# Patient Record
Sex: FEMALE | Race: WHITE | NOT HISPANIC OR LATINO | Employment: OTHER | URBAN - METROPOLITAN AREA
[De-identification: names, ages, dates, MRNs, and addresses within clinical notes are randomized per-mention and may not be internally consistent; named-entity substitution may affect disease eponyms.]

---

## 2018-08-31 ENCOUNTER — RECORDS - HEALTHEAST (OUTPATIENT)
Dept: LAB | Facility: CLINIC | Age: 83
End: 2018-08-31

## 2018-08-31 LAB
ANION GAP SERPL CALCULATED.3IONS-SCNC: 8 MMOL/L (ref 5–18)
BUN SERPL-MCNC: 25 MG/DL (ref 8–28)
CALCIUM SERPL-MCNC: 9 MG/DL (ref 8.5–10.5)
CHLORIDE BLD-SCNC: 104 MMOL/L (ref 98–107)
CO2 SERPL-SCNC: 26 MMOL/L (ref 22–31)
CREAT SERPL-MCNC: 0.66 MG/DL (ref 0.6–1.1)
ERYTHROCYTE [DISTWIDTH] IN BLOOD BY AUTOMATED COUNT: 13.3 % (ref 11–14.5)
GFR SERPL CREATININE-BSD FRML MDRD: >60 ML/MIN/1.73M2
GLUCOSE BLD-MCNC: 65 MG/DL (ref 70–125)
HCT VFR BLD AUTO: 30.5 % (ref 35–47)
HGB BLD-MCNC: 9.7 G/DL (ref 12–16)
MCH RBC QN AUTO: 29.4 PG (ref 27–34)
MCHC RBC AUTO-ENTMCNC: 31.8 G/DL (ref 32–36)
MCV RBC AUTO: 92 FL (ref 80–100)
PLATELET # BLD AUTO: 396 THOU/UL (ref 140–440)
PMV BLD AUTO: 9.2 FL (ref 8.5–12.5)
POTASSIUM BLD-SCNC: 4.7 MMOL/L (ref 3.5–5)
RBC # BLD AUTO: 3.3 MILL/UL (ref 3.8–5.4)
SODIUM SERPL-SCNC: 138 MMOL/L (ref 136–145)
WBC: 33.7 THOU/UL (ref 4–11)

## 2018-09-03 ENCOUNTER — RECORDS - HEALTHEAST (OUTPATIENT)
Dept: LAB | Facility: CLINIC | Age: 83
End: 2018-09-03

## 2018-09-03 LAB
ANION GAP SERPL CALCULATED.3IONS-SCNC: 8 MMOL/L (ref 5–18)
BUN SERPL-MCNC: 22 MG/DL (ref 8–28)
CALCIUM SERPL-MCNC: 9 MG/DL (ref 8.5–10.5)
CHLORIDE BLD-SCNC: 102 MMOL/L (ref 98–107)
CO2 SERPL-SCNC: 29 MMOL/L (ref 22–31)
CREAT SERPL-MCNC: 0.73 MG/DL (ref 0.6–1.1)
ERYTHROCYTE [DISTWIDTH] IN BLOOD BY AUTOMATED COUNT: 14.1 % (ref 11–14.5)
GFR SERPL CREATININE-BSD FRML MDRD: >60 ML/MIN/1.73M2
GLUCOSE BLD-MCNC: 62 MG/DL (ref 70–125)
HCT VFR BLD AUTO: 28.9 % (ref 35–47)
HGB BLD-MCNC: 9.4 G/DL (ref 12–16)
MCH RBC QN AUTO: 29.8 PG (ref 27–34)
MCHC RBC AUTO-ENTMCNC: 32.5 G/DL (ref 32–36)
MCV RBC AUTO: 92 FL (ref 80–100)
PLATELET # BLD AUTO: 340 THOU/UL (ref 140–440)
PMV BLD AUTO: 9.3 FL (ref 8.5–12.5)
POTASSIUM BLD-SCNC: 4.5 MMOL/L (ref 3.5–5)
RBC # BLD AUTO: 3.15 MILL/UL (ref 3.8–5.4)
SODIUM SERPL-SCNC: 139 MMOL/L (ref 136–145)
WBC: 37 THOU/UL (ref 4–11)

## 2018-10-22 ENCOUNTER — RECORDS - HEALTHEAST (OUTPATIENT)
Dept: LAB | Facility: CLINIC | Age: 83
End: 2018-10-22

## 2018-10-22 LAB
ANION GAP SERPL CALCULATED.3IONS-SCNC: 8 MMOL/L (ref 5–18)
BUN SERPL-MCNC: 20 MG/DL (ref 8–28)
CALCIUM SERPL-MCNC: 8.8 MG/DL (ref 8.5–10.5)
CHLORIDE BLD-SCNC: 103 MMOL/L (ref 98–107)
CO2 SERPL-SCNC: 27 MMOL/L (ref 22–31)
CREAT SERPL-MCNC: 0.71 MG/DL (ref 0.6–1.1)
GFR SERPL CREATININE-BSD FRML MDRD: >60 ML/MIN/1.73M2
GLUCOSE BLD-MCNC: 73 MG/DL (ref 70–125)
HGB BLD-MCNC: 9.3 G/DL (ref 12–16)
POTASSIUM BLD-SCNC: 4.3 MMOL/L (ref 3.5–5)
SODIUM SERPL-SCNC: 138 MMOL/L (ref 136–145)

## 2019-06-17 ENCOUNTER — RECORDS - HEALTHEAST (OUTPATIENT)
Dept: LAB | Facility: CLINIC | Age: 84
End: 2019-06-17

## 2019-06-17 LAB
ANION GAP SERPL CALCULATED.3IONS-SCNC: 8 MMOL/L (ref 5–18)
BUN SERPL-MCNC: 12 MG/DL (ref 8–28)
CALCIUM SERPL-MCNC: 8.9 MG/DL (ref 8.5–10.5)
CHLORIDE BLD-SCNC: 109 MMOL/L (ref 98–107)
CO2 SERPL-SCNC: 24 MMOL/L (ref 22–31)
CREAT SERPL-MCNC: 0.77 MG/DL (ref 0.6–1.1)
ERYTHROCYTE [DISTWIDTH] IN BLOOD BY AUTOMATED COUNT: 12.4 % (ref 11–14.5)
GFR SERPL CREATININE-BSD FRML MDRD: >60 ML/MIN/1.73M2
GLUCOSE BLD-MCNC: 68 MG/DL (ref 70–125)
HCT VFR BLD AUTO: 38.7 % (ref 35–47)
HGB BLD-MCNC: 12.2 G/DL (ref 12–16)
MCH RBC QN AUTO: 31.5 PG (ref 27–34)
MCHC RBC AUTO-ENTMCNC: 31.5 G/DL (ref 32–36)
MCV RBC AUTO: 100 FL (ref 80–100)
PLATELET # BLD AUTO: 281 THOU/UL (ref 140–440)
PMV BLD AUTO: 9.5 FL (ref 8.5–12.5)
POTASSIUM BLD-SCNC: 4.5 MMOL/L (ref 3.5–5)
RBC # BLD AUTO: 3.87 MILL/UL (ref 3.8–5.4)
SODIUM SERPL-SCNC: 141 MMOL/L (ref 136–145)
WBC: 10.8 THOU/UL (ref 4–11)

## 2019-10-02 ENCOUNTER — RECORDS - HEALTHEAST (OUTPATIENT)
Dept: LAB | Facility: CLINIC | Age: 84
End: 2019-10-02

## 2019-10-02 LAB — WBC: 21.6 THOU/UL (ref 4–11)

## 2019-10-16 ENCOUNTER — RECORDS - HEALTHEAST (OUTPATIENT)
Dept: LAB | Facility: CLINIC | Age: 84
End: 2019-10-16

## 2019-10-16 LAB
ANION GAP SERPL CALCULATED.3IONS-SCNC: 7 MMOL/L (ref 5–18)
BUN SERPL-MCNC: 24 MG/DL (ref 8–28)
CALCIUM SERPL-MCNC: 8.8 MG/DL (ref 8.5–10.5)
CHLORIDE BLD-SCNC: 107 MMOL/L (ref 98–107)
CO2 SERPL-SCNC: 26 MMOL/L (ref 22–31)
CREAT SERPL-MCNC: 0.77 MG/DL (ref 0.6–1.1)
ERYTHROCYTE [DISTWIDTH] IN BLOOD BY AUTOMATED COUNT: 14.1 % (ref 11–14.5)
GFR SERPL CREATININE-BSD FRML MDRD: >60 ML/MIN/1.73M2
GLUCOSE BLD-MCNC: 97 MG/DL (ref 70–125)
HCT VFR BLD AUTO: 37.2 % (ref 35–47)
HGB BLD-MCNC: 11.9 G/DL (ref 12–16)
MCH RBC QN AUTO: 30.7 PG (ref 27–34)
MCHC RBC AUTO-ENTMCNC: 32 G/DL (ref 32–36)
MCV RBC AUTO: 96 FL (ref 80–100)
PLATELET # BLD AUTO: 225 THOU/UL (ref 140–440)
PMV BLD AUTO: 9.6 FL (ref 8.5–12.5)
POTASSIUM BLD-SCNC: 4.1 MMOL/L (ref 3.5–5)
RBC # BLD AUTO: 3.88 MILL/UL (ref 3.8–5.4)
SODIUM SERPL-SCNC: 140 MMOL/L (ref 136–145)
TSH SERPL DL<=0.005 MIU/L-ACNC: 2.39 UIU/ML (ref 0.3–5)
VIT B12 SERPL-MCNC: 1056 PG/ML (ref 213–816)
WBC: 19.9 THOU/UL (ref 4–11)

## 2019-10-18 ENCOUNTER — APPOINTMENT (OUTPATIENT)
Dept: CT IMAGING | Facility: CLINIC | Age: 84
End: 2019-10-18
Attending: EMERGENCY MEDICINE
Payer: MEDICARE

## 2019-10-18 ENCOUNTER — HOSPITAL ENCOUNTER (EMERGENCY)
Facility: CLINIC | Age: 84
Discharge: HOME | End: 2019-10-18
Attending: EMERGENCY MEDICINE | Admitting: EMERGENCY MEDICINE
Payer: MEDICARE

## 2019-10-18 VITALS
DIASTOLIC BLOOD PRESSURE: 68 MMHG | OXYGEN SATURATION: 91 % | SYSTOLIC BLOOD PRESSURE: 164 MMHG | RESPIRATION RATE: 16 BRPM | TEMPERATURE: 97.7 F | HEART RATE: 70 BPM
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
## 2019-10-18 PROCEDURE — 99284 EMERGENCY DEPT VISIT MOD MDM: CPT | Mod: Z6 | Performed by: EMERGENCY MEDICINE

## 2019-10-18 PROCEDURE — 70450 CT HEAD/BRAIN W/O DYE: CPT

## 2019-10-18 PROCEDURE — 99284 EMERGENCY DEPT VISIT MOD MDM: CPT | Mod: 25

## 2019-10-18 RX ORDER — LOSARTAN POTASSIUM 25 MG/1
25 TABLET ORAL
COMMUNITY
Start: 2019-09-26 | End: 2022-01-01

## 2019-10-18 RX ORDER — AMOXICILLIN 250 MG
1 CAPSULE ORAL
COMMUNITY
Start: 2019-06-02 | End: 2022-01-01

## 2019-10-18 RX ORDER — PHENOL 1.4 %
1 AEROSOL, SPRAY (ML) MUCOUS MEMBRANE AT BEDTIME
COMMUNITY

## 2019-10-18 RX ORDER — CITALOPRAM HYDROBROMIDE 20 MG/1
20 TABLET ORAL DAILY
COMMUNITY
Start: 2018-09-25

## 2019-10-18 RX ORDER — ALBUTEROL SULFATE 0.83 MG/ML
2.5 SOLUTION RESPIRATORY (INHALATION) 3 TIMES DAILY PRN
COMMUNITY

## 2019-10-18 RX ORDER — DILTIAZEM HYDROCHLORIDE 30 MG/1
30 TABLET, FILM COATED ORAL
COMMUNITY
Start: 2019-09-25 | End: 2022-01-01

## 2019-10-18 RX ORDER — UREA 10 %
500 LOTION (ML) TOPICAL DAILY
COMMUNITY
Start: 2019-06-03

## 2019-10-18 RX ORDER — ACETAMINOPHEN 500 MG
1000 TABLET ORAL 3 TIMES DAILY
COMMUNITY
Start: 2019-06-02

## 2019-10-18 RX ORDER — DIGOXIN 125 MCG
125 TABLET ORAL
COMMUNITY
Start: 2019-09-25 | End: 2022-01-01

## 2019-10-18 NOTE — ED AVS SNAPSHOT
[facility] Emergency Department  [address] [address]  Phone:  [phone]  Fax:  [phone]                                    Cha Smith   MRN: [id number]    Department:  [facility] Emergency Department   Date of Visit:  10/18/2019           After Visit Summary Signature Page    I have received my discharge instructions, and my questions have been answered. I have discussed any challenges I see with this plan with the nurse or doctor.    ..........................................................................................................................................  Patient/Patient Representative Signature      ..........................................................................................................................................  Patient Representative Print Name and Relationship to Patient    ..................................................               ................................................  Date                                   Time    ..........................................................................................................................................  Reviewed by Signature/Title    ...................................................              ..............................................  Date                                               Time          22EPIC Rev 08/18

## 2019-10-18 NOTE — ED PROVIDER NOTES
History     Chief Complaint   Patient presents with     Fall     on blood thinners     HPI  Cha Smith is a 86 year old female with a history of atrial fibrillation for which she is on Eliquis presenting for evaluation of a fall at her nursing home.  Fall was unwitnessed and patient does not recall why she fell.  Denies any headache, neck pain, lightheadedness or dizziness.  Patient feels well with no complaints in the ED.  No numbness, tingling, or other acute problems.  Daughter is at bedside with her states patient is acting at baseline with no specific concerns    Allergies:  Allergies   Allergen Reactions     Blood-Group Specific Substance      Pneumococcal Vaccine      Prevnar 13 [Pneumococcal 13-Roseanna Conj Vacc]        Problem List:    There are no active problems to display for this patient.       Past Medical History:    No past medical history on file.    Past Surgical History:    No past surgical history on file.    Family History:    No family history on file.    Social History:  Marital Status:   [5]  Social History     Tobacco Use     Smoking status: Not on file   Substance Use Topics     Alcohol use: Not on file     Drug use: Not on file        Medications:    acetaminophen (TYLENOL) 325 MG tablet  albuterol (PROVENTIL) (2.5 MG/3ML) 0.083% neb solution  apixaban ANTICOAGULANT (ELIQUIS) 5 MG tablet  Cholecalciferol (VITAMIN D3) 1000 units CAPS  citalopram (CELEXA) 20 MG tablet  cyanocobalamin (VITAMIN B-12) 1000 MCG tablet  digoxin (LANOXIN) 125 MCG tablet  diltiazem (CARDIZEM) 30 MG tablet  hypromellose-dextran (HYPROMELLOSE-DEXTRAN 0.3-0.1%) 0.1-0.3 % ophthalmic solution  losartan (COZAAR) 25 MG tablet  senna-docusate (SENOKOT-S/PERICOLACE) 8.6-50 MG tablet  Melatonin 10 MG TABS tablet  umeclidinium-vilanterol (ANORO ELLIPTA) 62.5-25 MCG/INH oral inhaler          Review of Systems   Constitutional: Negative for fever.   HENT: Negative for congestion.    Eyes: Negative for visual  disturbance.   Respiratory: Negative for chest tightness and shortness of breath.    Cardiovascular: Negative for chest pain.   Gastrointestinal: Negative for abdominal pain.   Genitourinary: Negative for dysuria.   Musculoskeletal: Negative for back pain.   Skin: Positive for wound (superficial scratch to right cheek).   Neurological: Negative for weakness, light-headedness, numbness and headaches.   Psychiatric/Behavioral: Positive for confusion (at baseline).   All other systems reviewed and are negative.      Physical Exam   BP: (!) 179/84  Pulse: 70  Heart Rate: 70  Temp: 97.7  F (36.5  C)  Resp: 16  SpO2: 93 %      Physical Exam  Vitals signs and nursing note reviewed.   Constitutional:       Appearance: Normal appearance. She is obese. She is not ill-appearing or diaphoretic.   HENT:      Head: Normocephalic and atraumatic.      Nose: Nose normal.      Mouth/Throat:      Mouth: Mucous membranes are moist.   Eyes:      Conjunctiva/sclera: Conjunctivae normal.      Pupils: Pupils are equal, round, and reactive to light.   Neck:      Musculoskeletal: Normal range of motion. No muscular tenderness.   Cardiovascular:      Rate and Rhythm: Normal rate.   Pulmonary:      Effort: Pulmonary effort is normal.   Abdominal:      Tenderness: There is no tenderness.   Musculoskeletal: Normal range of motion.      Comments: Moving all extremities equally   Skin:     General: Skin is warm and dry.      Capillary Refill: Capillary refill takes less than 2 seconds.   Neurological:      Mental Status: She is alert. Mental status is at baseline.   Psychiatric:         Mood and Affect: Mood normal.         ED Course        Procedures              Results for orders placed or performed during the hospital encounter of 10/18/19 (from the past 24 hour(s))   Head CT w/o contrast    Narrative    EXAM: CT HEAD W/O CONTRAST  LOCATION: [facility]  DATE/TIME: 10/18/2019 5:28 AM    INDICATION: Fall. Head injury. Patient on  anticoagulation.  COMPARISON: None.  TECHNIQUE: Routine without IV contrast. Multiplanar reformats. Dose reduction techniques were used.    FINDINGS:  INTRACRANIAL CONTENTS: No intracranial hemorrhage, extraaxial collection, or mass effect.  No CT evidence of acute infarct. Severe presumed chronic small vessel ischemic changes. Mild to moderate generalized volume loss. No hydrocephalus.     VISUALIZED ORBITS/SINUSES/MASTOIDS: No intraorbital abnormality. No paranasal sinus mucosal disease. No middle ear or mastoid effusion.    BONES/SOFT TISSUES: No acute abnormality.      Impression    IMPRESSION:  1.  No acute intracranial hemorrhage or calvarial fracture.  2.  Evidence of advanced chronic small vessel ischemic changes.       Medications - No data to display    Assessments & Plan (with Medical Decision Making)  86-year-old female with history of atrial fibrillation on Eliquis and history of dementia presented for evaluation of fall.  Fall was unwitnessed and patient does not recall the details of the fall.  Because she is on Eliquis and had a small scratch on her cheek, nursing staff were concerned and sent her in for further evaluation.  Patient alert to baseline with no neurologic deficits in the ED.  CT of the head showed no acute hemorrhage or fracture.  Scratch on patient's cheek was cleaned and showed no evidence of deep injury requiring any repair.  Discharged back to the nursing home with patient's daughter     I have reviewed the nursing notes.    I have reviewed the findings, diagnosis, plan and need for follow up with the patient.       New Prescriptions    No medications on file       Final diagnoses:   Fall, initial encounter       10/18/2019   [facility] EMERGENCY DEPARTMENT     Laurent, Ihsan Casanova MD  10/19/19 [id number]

## 2019-10-18 NOTE — ED TRIAGE NOTES
Patient was found after having a fall in her room at [facility], patient with right small scratch on right cheek by ear.  Patient with dementia and is poor historian. Daughter in room at bedside states patient is at baseline. Patient on Eliquis blood thinner for A-fib.

## 2019-10-19 PROBLEM — K40.90 INGUINAL HERNIA: Status: ACTIVE | Noted: 2019-10-19

## 2019-10-19 PROBLEM — I82.4Z2 ACUTE DEEP VEIN THROMBOSIS (DVT) OF DISTAL VEIN OF LEFT LOWER EXTREMITY (H): Status: ACTIVE | Noted: 2018-09-25

## 2019-10-19 PROBLEM — I48.91 ATRIAL FIBRILLATION WITH RVR (H): Status: ACTIVE | Noted: 2019-09-15

## 2019-10-19 PROBLEM — J20.9 COPD (CHRONIC OBSTRUCTIVE PULMONARY DISEASE) WITH ACUTE BRONCHITIS (H): Status: ACTIVE | Noted: 2019-09-15

## 2019-10-19 PROBLEM — F41.9 ANXIETY: Status: ACTIVE | Noted: 2019-10-19

## 2019-10-19 PROBLEM — Z72.0 TOBACCO USE: Status: ACTIVE | Noted: 2017-04-19

## 2019-10-19 PROBLEM — E53.8 B12 DEFICIENCY: Status: ACTIVE | Noted: 2019-10-19

## 2019-10-19 PROBLEM — F41.1 GENERALIZED ANXIETY DISORDER: Status: ACTIVE | Noted: 2019-10-19

## 2019-10-19 PROBLEM — C34.92 ADENOCARCINOMA, LUNG, LEFT (H): Status: ACTIVE | Noted: 2018-08-15

## 2019-10-19 PROBLEM — F03.B18 MODERATE DEMENTIA WITH BEHAVIORAL DISTURBANCE (H): Status: ACTIVE | Noted: 2019-07-16

## 2019-10-19 PROBLEM — J44.0 COPD (CHRONIC OBSTRUCTIVE PULMONARY DISEASE) WITH ACUTE BRONCHITIS (H): Status: ACTIVE | Noted: 2019-09-15

## 2019-10-19 PROBLEM — G45.9 TIA (TRANSIENT ISCHEMIC ATTACK): Status: ACTIVE | Noted: 2019-10-19

## 2019-10-19 PROBLEM — Z90.49 S/P RIGHT HEMICOLECTOMY: Status: ACTIVE | Noted: 2017-10-18

## 2019-10-19 ASSESSMENT — ENCOUNTER SYMPTOMS
WOUND: 1
FEVER: 0
CHEST TIGHTNESS: 0
DYSURIA: 0
HEADACHES: 0
SHORTNESS OF BREATH: 0
LIGHT-HEADEDNESS: 0
NUMBNESS: 0
ABDOMINAL PAIN: 0
CONFUSION: 1
BACK PAIN: 0
WEAKNESS: 0

## 2019-10-29 ENCOUNTER — DOCUMENTATION ONLY (OUTPATIENT)
Dept: OTHER | Facility: CLINIC | Age: 84
End: 2019-10-29

## 2019-10-30 ENCOUNTER — RECORDS - HEALTHEAST (OUTPATIENT)
Dept: LAB | Facility: CLINIC | Age: 84
End: 2019-10-30

## 2019-10-30 LAB
ALBUMIN UR-MCNC: NEGATIVE MG/DL
AMORPH CRY #/AREA URNS HPF: ABNORMAL /[HPF]
APPEARANCE UR: ABNORMAL
BACTERIA #/AREA URNS HPF: ABNORMAL HPF
BILIRUB UR QL STRIP: NEGATIVE
COLOR UR AUTO: YELLOW
GLUCOSE UR STRIP-MCNC: NEGATIVE MG/DL
HGB UR QL STRIP: ABNORMAL
KETONES UR STRIP-MCNC: NEGATIVE MG/DL
LEUKOCYTE ESTERASE UR QL STRIP: NEGATIVE
NITRATE UR QL: NEGATIVE
PH UR STRIP: 5 [PH] (ref 4.5–8)
RBC #/AREA URNS AUTO: ABNORMAL HPF
SP GR UR STRIP: 1.02 (ref 1–1.03)
SQUAMOUS #/AREA URNS AUTO: ABNORMAL LPF
UROBILINOGEN UR STRIP-ACNC: ABNORMAL
WBC #/AREA URNS AUTO: ABNORMAL HPF

## 2019-10-31 LAB — BACTERIA SPEC CULT: ABNORMAL

## 2019-11-05 ENCOUNTER — RECORDS - HEALTHEAST (OUTPATIENT)
Dept: LAB | Facility: CLINIC | Age: 84
End: 2019-11-05

## 2019-11-05 LAB
ALBUMIN SERPL-MCNC: 2.6 G/DL (ref 3.5–5)
ALP SERPL-CCNC: 68 U/L (ref 45–120)
ALT SERPL W P-5'-P-CCNC: 26 U/L (ref 0–45)
ANION GAP SERPL CALCULATED.3IONS-SCNC: 7 MMOL/L (ref 5–18)
AST SERPL W P-5'-P-CCNC: 23 U/L (ref 0–40)
BILIRUB SERPL-MCNC: 0.5 MG/DL (ref 0–1)
BUN SERPL-MCNC: 13 MG/DL (ref 8–28)
CALCIUM SERPL-MCNC: 8.9 MG/DL (ref 8.5–10.5)
CHLORIDE BLD-SCNC: 107 MMOL/L (ref 98–107)
CO2 SERPL-SCNC: 28 MMOL/L (ref 22–31)
CREAT SERPL-MCNC: 0.71 MG/DL (ref 0.6–1.1)
GFR SERPL CREATININE-BSD FRML MDRD: >60 ML/MIN/1.73M2
GLUCOSE BLD-MCNC: 87 MG/DL (ref 70–125)
POTASSIUM BLD-SCNC: 3.8 MMOL/L (ref 3.5–5)
PROT SERPL-MCNC: 5.8 G/DL (ref 6–8)
SODIUM SERPL-SCNC: 142 MMOL/L (ref 136–145)

## 2020-01-12 ASSESSMENT — MIFFLIN-ST. JEOR
SCORE: 1217.86
SCORE: 1201.99

## 2020-01-13 ENCOUNTER — ANESTHESIA - HEALTHEAST (OUTPATIENT)
Dept: SURGERY | Facility: HOSPITAL | Age: 85
End: 2020-01-13

## 2020-01-13 ENCOUNTER — SURGERY - HEALTHEAST (OUTPATIENT)
Dept: SURGERY | Facility: HOSPITAL | Age: 85
End: 2020-01-13

## 2020-01-17 ENCOUNTER — OFFICE VISIT - HEALTHEAST (OUTPATIENT)
Dept: GERIATRICS | Facility: CLINIC | Age: 85
End: 2020-01-17

## 2020-01-17 DIAGNOSIS — F51.01 PRIMARY INSOMNIA: ICD-10-CM

## 2020-01-17 DIAGNOSIS — S72.002A CLOSED DISPLACED FRACTURE OF LEFT FEMORAL NECK (H): ICD-10-CM

## 2020-01-17 DIAGNOSIS — I82.4Z2 ACUTE DEEP VEIN THROMBOSIS (DVT) OF DISTAL VEIN OF LEFT LOWER EXTREMITY (H): ICD-10-CM

## 2020-01-17 DIAGNOSIS — I48.91 ATRIAL FIBRILLATION WITH RVR (H): ICD-10-CM

## 2020-01-17 DIAGNOSIS — C91.10 CLL (CHRONIC LYMPHOCYTIC LEUKEMIA) (H): ICD-10-CM

## 2020-01-17 DIAGNOSIS — J44.0 COPD (CHRONIC OBSTRUCTIVE PULMONARY DISEASE) WITH ACUTE BRONCHITIS (H): ICD-10-CM

## 2020-01-17 DIAGNOSIS — J69.0 ASPIRATION PNEUMONITIS (H): ICD-10-CM

## 2020-01-17 DIAGNOSIS — J20.9 COPD (CHRONIC OBSTRUCTIVE PULMONARY DISEASE) WITH ACUTE BRONCHITIS (H): ICD-10-CM

## 2020-01-20 ENCOUNTER — RECORDS - HEALTHEAST (OUTPATIENT)
Dept: LAB | Facility: CLINIC | Age: 85
End: 2020-01-20

## 2020-01-20 ENCOUNTER — OFFICE VISIT - HEALTHEAST (OUTPATIENT)
Dept: GERIATRICS | Facility: CLINIC | Age: 85
End: 2020-01-20

## 2020-01-20 DIAGNOSIS — S72.002A CLOSED DISPLACED FRACTURE OF LEFT FEMORAL NECK (H): ICD-10-CM

## 2020-01-20 DIAGNOSIS — I10 ESSENTIAL HYPERTENSION: ICD-10-CM

## 2020-01-20 DIAGNOSIS — J20.9 COPD (CHRONIC OBSTRUCTIVE PULMONARY DISEASE) WITH ACUTE BRONCHITIS (H): ICD-10-CM

## 2020-01-20 DIAGNOSIS — C91.10 CLL (CHRONIC LYMPHOCYTIC LEUKEMIA) (H): ICD-10-CM

## 2020-01-20 DIAGNOSIS — J44.0 COPD (CHRONIC OBSTRUCTIVE PULMONARY DISEASE) WITH ACUTE BRONCHITIS (H): ICD-10-CM

## 2020-01-20 DIAGNOSIS — C34.92 ADENOCARCINOMA, LUNG, LEFT (H): ICD-10-CM

## 2020-01-20 DIAGNOSIS — J69.0 ASPIRATION PNEUMONITIS (H): ICD-10-CM

## 2020-01-20 DIAGNOSIS — F03.B18 MODERATE DEMENTIA WITH BEHAVIORAL DISTURBANCE (H): ICD-10-CM

## 2020-01-20 LAB
25(OH)D3 SERPL-MCNC: 28.7 NG/ML (ref 30–80)
ANION GAP SERPL CALCULATED.3IONS-SCNC: 6 MMOL/L (ref 5–18)
BUN SERPL-MCNC: 17 MG/DL (ref 8–28)
CALCIUM SERPL-MCNC: 8.4 MG/DL (ref 8.5–10.5)
CHLORIDE BLD-SCNC: 105 MMOL/L (ref 98–107)
CO2 SERPL-SCNC: 32 MMOL/L (ref 22–31)
CREAT SERPL-MCNC: 0.65 MG/DL (ref 0.6–1.1)
ERYTHROCYTE [DISTWIDTH] IN BLOOD BY AUTOMATED COUNT: 13.3 % (ref 11–14.5)
GFR SERPL CREATININE-BSD FRML MDRD: >60 ML/MIN/1.73M2
GLUCOSE BLD-MCNC: 83 MG/DL (ref 70–125)
HCT VFR BLD AUTO: 24 % (ref 35–47)
HGB BLD-MCNC: 7.4 G/DL (ref 12–16)
MCH RBC QN AUTO: 31.1 PG (ref 27–34)
MCHC RBC AUTO-ENTMCNC: 30.8 G/DL (ref 32–36)
MCV RBC AUTO: 101 FL (ref 80–100)
PLATELET # BLD AUTO: 337 THOU/UL (ref 140–440)
PMV BLD AUTO: 9.7 FL (ref 8.5–12.5)
POTASSIUM BLD-SCNC: 3.8 MMOL/L (ref 3.5–5)
RBC # BLD AUTO: 2.38 MILL/UL (ref 3.8–5.4)
SODIUM SERPL-SCNC: 143 MMOL/L (ref 136–145)
WBC: 12.6 THOU/UL (ref 4–11)

## 2020-01-22 ENCOUNTER — OFFICE VISIT - HEALTHEAST (OUTPATIENT)
Dept: GERIATRICS | Facility: CLINIC | Age: 85
End: 2020-01-22

## 2020-01-22 DIAGNOSIS — J69.0 ASPIRATION PNEUMONITIS (H): ICD-10-CM

## 2020-01-22 DIAGNOSIS — J20.9 COPD (CHRONIC OBSTRUCTIVE PULMONARY DISEASE) WITH ACUTE BRONCHITIS (H): ICD-10-CM

## 2020-01-22 DIAGNOSIS — C91.10 CLL (CHRONIC LYMPHOCYTIC LEUKEMIA) (H): ICD-10-CM

## 2020-01-22 DIAGNOSIS — F03.B18 MODERATE DEMENTIA WITH BEHAVIORAL DISTURBANCE (H): ICD-10-CM

## 2020-01-22 DIAGNOSIS — S72.002A CLOSED DISPLACED FRACTURE OF LEFT FEMORAL NECK (H): ICD-10-CM

## 2020-01-22 DIAGNOSIS — I10 ESSENTIAL HYPERTENSION: ICD-10-CM

## 2020-01-22 DIAGNOSIS — J44.0 COPD (CHRONIC OBSTRUCTIVE PULMONARY DISEASE) WITH ACUTE BRONCHITIS (H): ICD-10-CM

## 2020-01-23 ENCOUNTER — AMBULATORY - HEALTHEAST (OUTPATIENT)
Dept: OTHER | Facility: CLINIC | Age: 85
End: 2020-01-23

## 2020-01-23 ENCOUNTER — RECORDS - HEALTHEAST (OUTPATIENT)
Dept: LAB | Facility: CLINIC | Age: 85
End: 2020-01-23

## 2020-01-23 LAB — HGB BLD-MCNC: 7.7 G/DL (ref 12–16)

## 2020-01-27 ENCOUNTER — OFFICE VISIT - HEALTHEAST (OUTPATIENT)
Dept: GERIATRICS | Facility: CLINIC | Age: 85
End: 2020-01-27

## 2020-01-27 ENCOUNTER — RECORDS - HEALTHEAST (OUTPATIENT)
Dept: LAB | Facility: CLINIC | Age: 85
End: 2020-01-27

## 2020-01-27 DIAGNOSIS — J44.0 COPD (CHRONIC OBSTRUCTIVE PULMONARY DISEASE) WITH ACUTE BRONCHITIS (H): ICD-10-CM

## 2020-01-27 DIAGNOSIS — S72.002A CLOSED DISPLACED FRACTURE OF LEFT FEMORAL NECK (H): ICD-10-CM

## 2020-01-27 DIAGNOSIS — J20.9 COPD (CHRONIC OBSTRUCTIVE PULMONARY DISEASE) WITH ACUTE BRONCHITIS (H): ICD-10-CM

## 2020-01-27 DIAGNOSIS — J69.0 ASPIRATION PNEUMONITIS (H): ICD-10-CM

## 2020-01-27 DIAGNOSIS — I10 ESSENTIAL HYPERTENSION: ICD-10-CM

## 2020-01-27 DIAGNOSIS — C91.10 CLL (CHRONIC LYMPHOCYTIC LEUKEMIA) (H): ICD-10-CM

## 2020-01-27 DIAGNOSIS — F51.01 PRIMARY INSOMNIA: ICD-10-CM

## 2020-01-27 LAB
ERYTHROCYTE [DISTWIDTH] IN BLOOD BY AUTOMATED COUNT: 16.2 % (ref 11–14.5)
FOLATE SERPL-MCNC: 11.6 NG/ML
HCT VFR BLD AUTO: 26.5 % (ref 35–47)
HGB BLD-MCNC: 7.8 G/DL (ref 12–16)
MCH RBC QN AUTO: 31.2 PG (ref 27–34)
MCHC RBC AUTO-ENTMCNC: 29.4 G/DL (ref 32–36)
MCV RBC AUTO: 106 FL (ref 80–100)
PLATELET # BLD AUTO: 395 THOU/UL (ref 140–440)
PMV BLD AUTO: 9.3 FL (ref 8.5–12.5)
RBC # BLD AUTO: 2.5 MILL/UL (ref 3.8–5.4)
WBC: 12.1 THOU/UL (ref 4–11)

## 2020-01-29 ENCOUNTER — OFFICE VISIT - HEALTHEAST (OUTPATIENT)
Dept: GERIATRICS | Facility: CLINIC | Age: 85
End: 2020-01-29

## 2020-01-29 DIAGNOSIS — C91.10 CLL (CHRONIC LYMPHOCYTIC LEUKEMIA) (H): ICD-10-CM

## 2020-01-29 DIAGNOSIS — D64.9 ANEMIA, UNSPECIFIED TYPE: ICD-10-CM

## 2020-01-29 DIAGNOSIS — J20.9 COPD (CHRONIC OBSTRUCTIVE PULMONARY DISEASE) WITH ACUTE BRONCHITIS (H): ICD-10-CM

## 2020-01-29 DIAGNOSIS — F03.B18 MODERATE DEMENTIA WITH BEHAVIORAL DISTURBANCE (H): ICD-10-CM

## 2020-01-29 DIAGNOSIS — J44.0 COPD (CHRONIC OBSTRUCTIVE PULMONARY DISEASE) WITH ACUTE BRONCHITIS (H): ICD-10-CM

## 2020-01-29 DIAGNOSIS — I48.91 ATRIAL FIBRILLATION WITH RVR (H): ICD-10-CM

## 2020-01-29 DIAGNOSIS — S72.002A CLOSED DISPLACED FRACTURE OF LEFT FEMORAL NECK (H): ICD-10-CM

## 2020-02-03 ENCOUNTER — OFFICE VISIT - HEALTHEAST (OUTPATIENT)
Dept: GERIATRICS | Facility: CLINIC | Age: 85
End: 2020-02-03

## 2020-02-03 DIAGNOSIS — C91.10 CLL (CHRONIC LYMPHOCYTIC LEUKEMIA) (H): ICD-10-CM

## 2020-02-03 DIAGNOSIS — I10 ESSENTIAL HYPERTENSION: ICD-10-CM

## 2020-02-03 DIAGNOSIS — J44.0 COPD (CHRONIC OBSTRUCTIVE PULMONARY DISEASE) WITH ACUTE BRONCHITIS (H): ICD-10-CM

## 2020-02-03 DIAGNOSIS — J20.9 COPD (CHRONIC OBSTRUCTIVE PULMONARY DISEASE) WITH ACUTE BRONCHITIS (H): ICD-10-CM

## 2020-02-03 DIAGNOSIS — J69.0 ASPIRATION PNEUMONITIS (H): ICD-10-CM

## 2020-02-03 DIAGNOSIS — F51.01 PRIMARY INSOMNIA: ICD-10-CM

## 2020-02-03 DIAGNOSIS — E55.9 VITAMIN D DEFICIENCY: ICD-10-CM

## 2020-02-03 ASSESSMENT — MIFFLIN-ST. JEOR: SCORE: 1217.86

## 2020-02-04 ENCOUNTER — RECORDS - HEALTHEAST (OUTPATIENT)
Dept: LAB | Facility: CLINIC | Age: 85
End: 2020-02-04

## 2020-02-04 LAB
ERYTHROCYTE [DISTWIDTH] IN BLOOD BY AUTOMATED COUNT: 15.6 % (ref 11–14.5)
HCT VFR BLD AUTO: 36.3 % (ref 35–47)
HGB BLD-MCNC: 11 G/DL (ref 12–16)
MCH RBC QN AUTO: 31.6 PG (ref 27–34)
MCHC RBC AUTO-ENTMCNC: 30.3 G/DL (ref 32–36)
MCV RBC AUTO: 104 FL (ref 80–100)
PLATELET # BLD AUTO: 385 THOU/UL (ref 140–440)
PMV BLD AUTO: 9.6 FL (ref 8.5–12.5)
RBC # BLD AUTO: 3.48 MILL/UL (ref 3.8–5.4)
WBC: 11.4 THOU/UL (ref 4–11)

## 2020-02-05 ENCOUNTER — OFFICE VISIT - HEALTHEAST (OUTPATIENT)
Dept: GERIATRICS | Facility: CLINIC | Age: 85
End: 2020-02-05

## 2020-02-05 DIAGNOSIS — F03.B18 MODERATE DEMENTIA WITH BEHAVIORAL DISTURBANCE (H): ICD-10-CM

## 2020-02-05 DIAGNOSIS — S72.002A CLOSED DISPLACED FRACTURE OF LEFT FEMORAL NECK (H): ICD-10-CM

## 2020-02-05 DIAGNOSIS — J20.9 COPD (CHRONIC OBSTRUCTIVE PULMONARY DISEASE) WITH ACUTE BRONCHITIS (H): ICD-10-CM

## 2020-02-05 DIAGNOSIS — D64.9 ANEMIA, UNSPECIFIED TYPE: ICD-10-CM

## 2020-02-05 DIAGNOSIS — J44.0 COPD (CHRONIC OBSTRUCTIVE PULMONARY DISEASE) WITH ACUTE BRONCHITIS (H): ICD-10-CM

## 2020-02-05 DIAGNOSIS — I48.91 ATRIAL FIBRILLATION WITH RVR (H): ICD-10-CM

## 2020-02-05 DIAGNOSIS — C91.10 CLL (CHRONIC LYMPHOCYTIC LEUKEMIA) (H): ICD-10-CM

## 2020-02-10 ENCOUNTER — OFFICE VISIT - HEALTHEAST (OUTPATIENT)
Dept: GERIATRICS | Facility: CLINIC | Age: 85
End: 2020-02-10

## 2020-02-10 DIAGNOSIS — I10 ESSENTIAL HYPERTENSION: ICD-10-CM

## 2020-02-10 DIAGNOSIS — S72.002A CLOSED DISPLACED FRACTURE OF LEFT FEMORAL NECK (H): ICD-10-CM

## 2020-02-10 DIAGNOSIS — C91.10 CLL (CHRONIC LYMPHOCYTIC LEUKEMIA) (H): ICD-10-CM

## 2020-02-10 DIAGNOSIS — J44.0 COPD (CHRONIC OBSTRUCTIVE PULMONARY DISEASE) WITH ACUTE BRONCHITIS (H): ICD-10-CM

## 2020-02-10 DIAGNOSIS — E55.9 VITAMIN D DEFICIENCY: ICD-10-CM

## 2020-02-10 DIAGNOSIS — F51.01 PRIMARY INSOMNIA: ICD-10-CM

## 2020-02-10 DIAGNOSIS — J20.9 COPD (CHRONIC OBSTRUCTIVE PULMONARY DISEASE) WITH ACUTE BRONCHITIS (H): ICD-10-CM

## 2020-02-10 DIAGNOSIS — J69.0 ASPIRATION PNEUMONITIS (H): ICD-10-CM

## 2020-02-10 ASSESSMENT — MIFFLIN-ST. JEOR: SCORE: 1204.26

## 2020-02-11 ENCOUNTER — OFFICE VISIT - HEALTHEAST (OUTPATIENT)
Dept: GERIATRICS | Facility: CLINIC | Age: 85
End: 2020-02-11

## 2020-02-11 DIAGNOSIS — C91.10 CLL (CHRONIC LYMPHOCYTIC LEUKEMIA) (H): ICD-10-CM

## 2020-02-11 DIAGNOSIS — J69.0 ASPIRATION PNEUMONITIS (H): ICD-10-CM

## 2020-02-11 DIAGNOSIS — I10 ESSENTIAL HYPERTENSION: ICD-10-CM

## 2020-02-11 DIAGNOSIS — S72.002A CLOSED DISPLACED FRACTURE OF LEFT FEMORAL NECK (H): ICD-10-CM

## 2020-02-11 DIAGNOSIS — I48.91 ATRIAL FIBRILLATION WITH RVR (H): ICD-10-CM

## 2020-02-11 DIAGNOSIS — F51.01 PRIMARY INSOMNIA: ICD-10-CM

## 2020-02-14 ENCOUNTER — AMBULATORY - HEALTHEAST (OUTPATIENT)
Dept: GERIATRICS | Facility: CLINIC | Age: 85
End: 2020-02-14

## 2020-02-27 ENCOUNTER — RECORDS - HEALTHEAST (OUTPATIENT)
Dept: LAB | Facility: CLINIC | Age: 85
End: 2020-02-27

## 2020-02-27 LAB
ALBUMIN UR-MCNC: NEGATIVE MG/DL
AMORPH CRY #/AREA URNS HPF: ABNORMAL /[HPF]
APPEARANCE UR: ABNORMAL
BACTERIA #/AREA URNS HPF: ABNORMAL HPF
BILIRUB UR QL STRIP: NEGATIVE
CAOX CRY #/AREA URNS HPF: PRESENT /[HPF]
COLOR UR AUTO: YELLOW
GLUCOSE UR STRIP-MCNC: NEGATIVE MG/DL
HGB UR QL STRIP: ABNORMAL
HYALINE CASTS #/AREA URNS LPF: ABNORMAL LPF
KETONES UR STRIP-MCNC: NEGATIVE MG/DL
LEUKOCYTE ESTERASE UR QL STRIP: NEGATIVE
MUCOUS THREADS #/AREA URNS LPF: ABNORMAL LPF
NITRATE UR QL: NEGATIVE
PH UR STRIP: 5.5 [PH] (ref 4.5–8)
RBC #/AREA URNS AUTO: ABNORMAL HPF
SP GR UR STRIP: 1.02 (ref 1–1.03)
SQUAMOUS #/AREA URNS AUTO: ABNORMAL LPF
UROBILINOGEN UR STRIP-ACNC: ABNORMAL
WBC #/AREA URNS AUTO: ABNORMAL HPF

## 2020-02-29 LAB — BACTERIA SPEC CULT: NORMAL

## 2020-07-17 ENCOUNTER — RECORDS - HEALTHEAST (OUTPATIENT)
Dept: LAB | Facility: CLINIC | Age: 85
End: 2020-07-17

## 2020-07-17 LAB
ALBUMIN UR-MCNC: NEGATIVE MG/DL
ANION GAP SERPL CALCULATED.3IONS-SCNC: 12 MMOL/L (ref 5–18)
APPEARANCE UR: CLEAR
BACTERIA #/AREA URNS HPF: ABNORMAL HPF
BILIRUB UR QL STRIP: NEGATIVE
BUN SERPL-MCNC: 33 MG/DL (ref 8–28)
CALCIUM SERPL-MCNC: 9.6 MG/DL (ref 8.5–10.5)
CHLORIDE BLD-SCNC: 103 MMOL/L (ref 98–107)
CO2 SERPL-SCNC: 25 MMOL/L (ref 22–31)
COLOR UR AUTO: YELLOW
CREAT SERPL-MCNC: 0.96 MG/DL (ref 0.6–1.1)
ERYTHROCYTE [DISTWIDTH] IN BLOOD BY AUTOMATED COUNT: 12.4 % (ref 11–14.5)
GFR SERPL CREATININE-BSD FRML MDRD: 55 ML/MIN/1.73M2
GLUCOSE BLD-MCNC: 81 MG/DL (ref 70–125)
GLUCOSE UR STRIP-MCNC: NEGATIVE MG/DL
HCT VFR BLD AUTO: 40.9 % (ref 35–47)
HGB BLD-MCNC: 12.9 G/DL (ref 12–16)
HGB UR QL STRIP: ABNORMAL
HYALINE CASTS #/AREA URNS LPF: ABNORMAL LPF
KETONES UR STRIP-MCNC: NEGATIVE MG/DL
LEUKOCYTE ESTERASE UR QL STRIP: NEGATIVE
MCH RBC QN AUTO: 31.9 PG (ref 27–34)
MCHC RBC AUTO-ENTMCNC: 31.5 G/DL (ref 32–36)
MCV RBC AUTO: 101 FL (ref 80–100)
MUCOUS THREADS #/AREA URNS LPF: ABNORMAL LPF
NITRATE UR QL: NEGATIVE
PH UR STRIP: 5.5 [PH] (ref 4.5–8)
PLATELET # BLD AUTO: 277 THOU/UL (ref 140–440)
PMV BLD AUTO: 9.7 FL (ref 8.5–12.5)
POTASSIUM BLD-SCNC: 4 MMOL/L (ref 3.5–5)
RBC # BLD AUTO: 4.05 MILL/UL (ref 3.8–5.4)
RBC #/AREA URNS AUTO: ABNORMAL HPF
SODIUM SERPL-SCNC: 140 MMOL/L (ref 136–145)
SP GR UR STRIP: 1.02 (ref 1–1.03)
SQUAMOUS #/AREA URNS AUTO: ABNORMAL LPF
TSH SERPL DL<=0.005 MIU/L-ACNC: 1.85 UIU/ML (ref 0.3–5)
UROBILINOGEN UR STRIP-ACNC: ABNORMAL
VIT B12 SERPL-MCNC: 1098 PG/ML (ref 213–816)
WBC #/AREA URNS AUTO: ABNORMAL HPF
WBC: 10.5 THOU/UL (ref 4–11)

## 2020-07-18 LAB — BACTERIA SPEC CULT: NO GROWTH

## 2020-10-02 ENCOUNTER — RECORDS - HEALTHEAST (OUTPATIENT)
Dept: LAB | Facility: CLINIC | Age: 85
End: 2020-10-02

## 2020-10-05 LAB — BACTERIA SPEC CULT: ABNORMAL

## 2020-12-07 ENCOUNTER — RECORDS - HEALTHEAST (OUTPATIENT)
Dept: LAB | Facility: CLINIC | Age: 85
End: 2020-12-07

## 2020-12-07 LAB
ALBUMIN UR-MCNC: NEGATIVE MG/DL
APPEARANCE UR: ABNORMAL
BACTERIA #/AREA URNS HPF: ABNORMAL HPF
BILIRUB UR QL STRIP: NEGATIVE
COLOR UR AUTO: ABNORMAL
GLUCOSE UR STRIP-MCNC: NEGATIVE MG/DL
HGB UR QL STRIP: ABNORMAL
HYALINE CASTS #/AREA URNS LPF: ABNORMAL LPF
KETONES UR STRIP-MCNC: NEGATIVE MG/DL
LEUKOCYTE ESTERASE UR QL STRIP: NEGATIVE
MUCOUS THREADS #/AREA URNS LPF: ABNORMAL LPF
NITRATE UR QL: NEGATIVE
PH UR STRIP: 5 [PH] (ref 4.5–8)
RBC #/AREA URNS AUTO: ABNORMAL HPF
SP GR UR STRIP: 1.01 (ref 1–1.03)
SQUAMOUS #/AREA URNS AUTO: ABNORMAL LPF
UROBILINOGEN UR STRIP-ACNC: ABNORMAL
WBC #/AREA URNS AUTO: ABNORMAL HPF

## 2020-12-08 LAB — BACTERIA SPEC CULT: NO GROWTH

## 2021-04-14 ENCOUNTER — RECORDS - HEALTHEAST (OUTPATIENT)
Dept: LAB | Facility: CLINIC | Age: 86
End: 2021-04-14

## 2021-04-15 ENCOUNTER — RECORDS - HEALTHEAST (OUTPATIENT)
Dept: LAB | Facility: CLINIC | Age: 86
End: 2021-04-15

## 2021-04-15 LAB — BACTERIA SPEC CULT: NO GROWTH

## 2021-04-16 ENCOUNTER — RECORDS - HEALTHEAST (OUTPATIENT)
Dept: LAB | Facility: CLINIC | Age: 86
End: 2021-04-16

## 2021-04-16 LAB
ALBUMIN SERPL-MCNC: 3.8 G/DL (ref 3.5–5)
ALP SERPL-CCNC: 68 U/L (ref 45–120)
ALT SERPL W P-5'-P-CCNC: 11 U/L (ref 0–45)
ANION GAP SERPL CALCULATED.3IONS-SCNC: 12 MMOL/L (ref 5–18)
AST SERPL W P-5'-P-CCNC: 14 U/L (ref 0–40)
BILIRUB SERPL-MCNC: 0.5 MG/DL (ref 0–1)
BUN SERPL-MCNC: 31 MG/DL (ref 8–28)
CALCIUM SERPL-MCNC: 9.9 MG/DL (ref 8.5–10.5)
CHLORIDE BLD-SCNC: 104 MMOL/L (ref 98–107)
CO2 SERPL-SCNC: 29 MMOL/L (ref 22–31)
CREAT SERPL-MCNC: 1.05 MG/DL (ref 0.6–1.1)
ERYTHROCYTE [DISTWIDTH] IN BLOOD BY AUTOMATED COUNT: 12.6 % (ref 11–14.5)
GFR SERPL CREATININE-BSD FRML MDRD: 50 ML/MIN/1.73M2
GLUCOSE BLD-MCNC: 76 MG/DL (ref 70–125)
HCT VFR BLD AUTO: 41.2 % (ref 35–47)
HGB BLD-MCNC: 13 G/DL (ref 12–16)
MCH RBC QN AUTO: 31.6 PG (ref 27–34)
MCHC RBC AUTO-ENTMCNC: 31.6 G/DL (ref 32–36)
MCV RBC AUTO: 100 FL (ref 80–100)
PLATELET # BLD AUTO: 302 THOU/UL (ref 140–440)
PMV BLD AUTO: 10 FL (ref 8.5–12.5)
POTASSIUM BLD-SCNC: 4.1 MMOL/L (ref 3.5–5)
PROT SERPL-MCNC: 6.5 G/DL (ref 6–8)
RBC # BLD AUTO: 4.11 MILL/UL (ref 3.8–5.4)
SODIUM SERPL-SCNC: 145 MMOL/L (ref 136–145)
TSH SERPL DL<=0.005 MIU/L-ACNC: 2.13 UIU/ML (ref 0.3–5)
WBC: 16.8 THOU/UL (ref 4–11)

## 2021-04-19 LAB — VIT B12 SERPL-MCNC: 1965 PG/ML (ref 213–816)

## 2021-05-27 VITALS
SYSTOLIC BLOOD PRESSURE: 97 MMHG | DIASTOLIC BLOOD PRESSURE: 56 MMHG | OXYGEN SATURATION: 95 % | HEART RATE: 77 BPM | TEMPERATURE: 97 F | RESPIRATION RATE: 18 BRPM

## 2021-06-04 VITALS
SYSTOLIC BLOOD PRESSURE: 115 MMHG | HEART RATE: 70 BPM | OXYGEN SATURATION: 97 % | TEMPERATURE: 98 F | BODY MASS INDEX: 26.79 KG/M2 | RESPIRATION RATE: 18 BRPM | WEIGHT: 166 LBS | DIASTOLIC BLOOD PRESSURE: 60 MMHG

## 2021-06-04 VITALS
HEIGHT: 66 IN | RESPIRATION RATE: 16 BRPM | SYSTOLIC BLOOD PRESSURE: 137 MMHG | DIASTOLIC BLOOD PRESSURE: 75 MMHG | TEMPERATURE: 97 F | HEART RATE: 81 BPM | WEIGHT: 170 LBS | BODY MASS INDEX: 27.32 KG/M2 | OXYGEN SATURATION: 96 %

## 2021-06-04 VITALS
SYSTOLIC BLOOD PRESSURE: 138 MMHG | HEART RATE: 70 BPM | OXYGEN SATURATION: 93 % | DIASTOLIC BLOOD PRESSURE: 72 MMHG | TEMPERATURE: 97 F | BODY MASS INDEX: 26.95 KG/M2 | RESPIRATION RATE: 16 BRPM | WEIGHT: 167 LBS

## 2021-06-04 VITALS — BODY MASS INDEX: 26.76 KG/M2 | WEIGHT: 166.5 LBS | HEIGHT: 66 IN

## 2021-06-04 VITALS
RESPIRATION RATE: 20 BRPM | HEIGHT: 66 IN | BODY MASS INDEX: 26.84 KG/M2 | TEMPERATURE: 97 F | OXYGEN SATURATION: 95 % | SYSTOLIC BLOOD PRESSURE: 148 MMHG | DIASTOLIC BLOOD PRESSURE: 82 MMHG | WEIGHT: 167 LBS | HEART RATE: 68 BPM

## 2021-06-05 NOTE — ANESTHESIA CARE TRANSFER NOTE
Last vitals:   Vitals:    01/13/20 1005   BP: 111/56   Pulse: 80   Resp: 16   Temp: 37.2  C (98.9  F)   SpO2: 100%     Patient's level of consciousness is drowsy  Spontaneous respirations: yes  Maintains airway independently: yes  Dentition unchanged: yes  Oropharynx: oropharynx clear of all foreign objects    QCDR Measures:  ASA# 20 - Surgical Safety Checklist: WHO surgical safety checklist completed prior to induction    PQRS# 430 - Adult PONV Prevention: 4558F - Pt received => 2 anti-emetic agents (different classes) preop & intraop  ASA# 8 - Peds PONV Prevention: NA - Not pediatric patient, not GA or 2 or more risk factors NOT present  PQRS# 424 - Radha-op Temp Management: 4559F - At least one body temp DOCUMENTED => 35.5C or 95.9F within required timeframe  PQRS# 426 - PACU Transfer Protocol: - Transfer of care checklist used  ASA# 14 - Acute Post-op Pain: ASA14B - Patient did NOT experience pain >= 7 out of 10

## 2021-06-05 NOTE — PROGRESS NOTES
[facility]      Facility:    [facility] [[id number]]  Code Status: DNR      Chief Complaint/Reason for Visit:   Chief Complaint   Patient presents with     Review Of Multiple Medical Conditions       HPI:   Cha is a 86 y.o. female who is a recent transfer from [facility] with an admission on 1/12/20 and discharge on 1/16/20.  She has a past medical history of dementia, A. 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  She underwent repair, given weightbearing as tolerated, Tylenol and tramadol for pain control.  Also developed ABLA with an initial hemoglobin at 12.6, at discharge 8.2.  Scheduled to follow-up with Ortho in 2 to 3 weeks.  Her CLL has chronic leukocytosis though may be contributing with steroid use.  She also had acute respiratory failure with hypoxia this history of aspiration pneumonitis, needed supplemental oxygen, has weaned down to 2 L.  Admission chest x-ray showed right upper lobe atelectasis, left upper lobe atelectasis likely due to chronic pneumonitis.  Per family was intermittently treated with antibiotics and steroid burst.  Recently completed course of Augmentin, her procalcitonin was negative no infectious symptoms noted.  We will continue to wean oxygen.  Per A. fib, family was consulted on ongoing anticoagulation though has deemed to continue, she is in normal sinus rhythm currently and she has normal EF.  She was then discharged to TCU for rehab.    Today will assess pain control, CHF, recent labs and therapy progress.    Past Medical History:  Past Medical History:   Diagnosis Date     A-fib (H)      Breast cancer (H)      CLL (chronic lymphocytic leukemia)  (H)      COPD (chronic obstructive pulmonary disease) (H)      Dementia (H)      DVT (deep venous thrombosis) (H)      Hypertension      Lung cancer (H)            Surgical History:  Past Surgical History:   Procedure Laterality Date     BREAST BIOPSY       HERNIA REPAIR       HYSTERECTOMY       MASTECTOMY       VA PARTIAL HIP REPLACEMENT Left 1/13/2020    Procedure: HEMIARTHROPLASTY, HIP, UNIPOLAR;  Surgeon: Dav Steward MD;  [place] [facility] [place]: [place]oped[place]       Family History:   No family history on file.    Social History:    Social History     Socioeconomic History     Marital status:      Spouse name: Not on file     Number of children: Not on file     Years of education: Not on file     Highest education level: Not on file   Occupational History     Not on file   Social Needs     Financial resource strain: Not on file     Food insecurity:     Worry: Not on file     Inability: Not on file     Transportation needs:     Medical: Not on file     Non-medical: Not on file   Tobacco Use     Smoking status: Former Smoker     Smokeless tobacco: Former User   Substance and Sexual Activity     Alcohol use: Not Currently     Frequency: Never     Binge frequency: Never     Drug use: Not Currently     Sexual activity: Not Currently   Lifestyle     Physical activity:     Days per week: Not on file     Minutes per session: Not on file     Stress: Not on file   Relationships     Social connections:     Talks on phone: Not on file     Gets together: Not on file     Attends [employer] service: Not on file     Active member of club or organization: Not on file     Attends meetings of clubs or organizations: Not on file     Relationship status: Not on file     Intimate partner violence:     Fear of current or ex partner: Not on file     Emotionally abused: Not on file     Physically abused: Not on file     Forced sexual activity: Not on file   Other Topics Concern     Not on file   Social History  "Narrative     Not on file          Review of Systems   Constitutional: Positive for activity change. Negative for appetite change, chills, diaphoresis and fatigue.        No acute issues   HENT: Positive for hearing loss. Negative for congestion.    Eyes: Negative.    Respiratory: Negative for shortness of breath and wheezing.    Cardiovascular: Positive for leg swelling.   Gastrointestinal: Negative for abdominal distention, abdominal pain, blood in stool, constipation and diarrhea.   Endocrine: Negative.    Genitourinary: Negative for difficulty urinating.   Musculoskeletal:        Denies pain   Skin: Positive for wound.        L hip incision   Allergic/Immunologic: Negative.    Neurological: Negative for dizziness, tremors, speech difficulty and light-headedness.   Hematological: Negative.    Psychiatric/Behavioral: Positive for confusion. Negative for agitation and hallucinations. The patient is not nervous/anxious.        Vitals:    02/10/20 0900   BP: 148/82   Pulse: 68   Resp: 20   Temp: 97  F (36.1  C)   SpO2: 95%   Weight: 167 lb (75.8 kg)   Height: 5' 6\" (1.676 m)       Physical Exam  Constitutional:       Comments: No acute issues   HENT:      Head: Normocephalic and atraumatic.      Right Ear: External ear normal.      Left Ear: External ear normal.      Mouth/Throat:      Pharynx: No oropharyngeal exudate or posterior oropharyngeal erythema.   Eyes:      General:         Right eye: No discharge.         Left eye: No discharge.   Neck:      Musculoskeletal: Normal range of motion and neck supple.   Cardiovascular:      Rate and Rhythm: Normal rate.      Heart sounds: No murmur. No friction rub. No gallop.       Comments: Hx of A-fib  Pulmonary:      Effort: No respiratory distress.      Breath sounds: No wheezing or rales.      Comments: Dim, MORE, RA  Abdominal:      General: There is no distension.      Palpations: Abdomen is soft.      Tenderness: There is no abdominal tenderness. There is no guarding. "      Comments: Denies constipation or diarrhea   Genitourinary:     Comments: incontinent  Musculoskeletal:      Right lower leg: Edema present.      Left lower leg: Edema present.      Comments: 1+BLE, wc bound, denies pain   Skin:     General: Skin is warm and dry.      Comments: L hip incision intact   Neurological:      Mental Status: She is alert. Mental status is at baseline. She is disoriented.      Comments: A/O x1   Psychiatric:         Mood and Affect: Mood normal.         Medication List:  Current Outpatient Medications   Medication Sig     acetaminophen (TYLENOL) 500 MG tablet Take 2 tablets (1,000 mg total) by mouth 3 (three) times a day.     albuterol (PROVENTIL) 2.5 mg /3 mL (0.083 %) nebulizer solution Take 3 mL (2.5 mg total) by nebulization 3 (three) times a day as needed for wheezing.     albuterol (PROVENTIL) 2.5 mg /3 mL (0.083 %) nebulizer solution Take 3 mL (2.5 mg total) by nebulization 3 (three) times a day.     apixaban ANTICOAGULANT (ELIQUIS) 5 mg Tab tablet Take 1 tablet (5 mg total) by mouth 2 (two) times a day.     artificial tear,dxtrn-hpm-gly, (GENTEAL TEARS MODERATE) 0.1-0.3-0.2 % Drop ophthalmic drops Administer 1 drop to the right eye 2 (two) times a day.     calcium, as carbonate, (OS-MAGUI) 500 mg calcium (1,250 mg) tablet Take 1 tablet by mouth daily.     cholecalciferol, vitamin D3, 5,000 unit Tab Take 1 tablet by mouth daily.     citalopram (CELEXA) 20 MG tablet Take 1 tablet (20 mg total) by mouth daily.     cyanocobalamin 1000 MCG tablet Take 1 tablet (1,000 mcg total) by mouth daily.     ferrous sulfate 325 (65 FE) MG tablet Take 1 tablet by mouth 2 (two) times a day.      furosemide (LASIX) 20 MG tablet Take 1 tablet (20 mg total) by mouth daily.     melatonin 10 mg Tab Take 10 mg by mouth at bedtime.     metoprolol succinate (TOPROL-XL) 50 MG 24 hr tablet Take 1 tablet (50 mg total) by mouth daily. (Patient taking differently: Take 12.5 mg by mouth daily. )     polyethylene  glycol (MIRALAX) 17 gram packet Take 1 packet (17 g total) by mouth daily.     predniSONE (DELTASONE) 2.5 MG tablet Take 2.5 mg by mouth daily.     senna-docusate (SENNOSIDES-DOCUSATE SODIUM) 8.6-50 mg tablet Take 1 tablet by mouth 2 (two) times a day as needed for constipation.     umeclidinium-vilanterol (ANORO ELLIPTA) 62.5-25 mcg/actuation inhaler Inhale 1 puff daily.       Labs:  Results for orders placed or performed in visit on 01/20/20   Basic Metabolic Panel   Result Value Ref Range    Sodium 143 136 - 145 mmol/L    Potassium 3.8 3.5 - 5.0 mmol/L    Chloride 105 98 - 107 mmol/L    CO2 32 (H) 22 - 31 mmol/L    Anion Gap, Calculation 6 5 - 18 mmol/L    Glucose 83 70 - 125 mg/dL    Calcium 8.4 (L) 8.5 - 10.5 mg/dL    BUN 17 8 - 28 mg/dL    Creatinine 0.65 0.60 - 1.10 mg/dL    GFR MDRD Af Amer >60 >60 mL/min/1.73m2    GFR MDRD Non Af Amer >60 >60 mL/min/1.73m2     Lab Results   Component Value Date    WBC 11.4 (H) 02/04/2020    HGB 11.0 (L) 02/04/2020    HCT 36.3 02/04/2020     (H) 02/04/2020     02/04/2020     Lab Results   Component Value Date    TSH 2.39 10/16/2019     Vitamin D, Total (25-Hydroxy)   Date Value Ref Range Status   01/20/2020 28.7 (L) 30.0 - 80.0 ng/mL Final       Lab Results   Component Value Date    [id number] 1,056 (H) 10/16/2019       Assessment/Plan:    Commuted left femoral neck fracture status post left hip hemiarthroplasty on 1/13/2020: Given weightbearing as tolerated, increased supplementation, f/u in 4-6 wks    DVT prophylaxis with history of A. Fib: Continue Eliquis 5 mg twice daily    Pain control: Continue Tylenol 1000 mg 3 times daily, dc'd tramadol, denies pain    ABLA: initial Hgb 12.6, last 11.0 on 2/4/20, decrease FeSO4 325mg to daily. Folate WNLs.  Recheck CBC in 1 wk    Chronic CLL with leukocytosis and aspiration pneumonitis on chronic steroids: Continue prednisone 2.5 mg daily, last WBC 11.4.  Continue anoro.  Weaned off O2, continue albuterol nebs three  times a day    History of aspiration: Speech evaluated, no change in diet    Vitamin D deficiency: continue D3 5000 units daily    Vitamin B 12 deficiency: Continue cyanocobalamin 1000mcg daily    Calcium supplement: increased to 1250mg daily    HTN: continue metoprolol succinate 12.5mg daily, SBP <130    HFpEF: stable per hospital report, continue lasix 20mg daily, last 167lb, stable    Depression: Continue citalopram 20 mg daily     Insomnia: Continue melatonin 10 mg at bedtime. DC'd trazodone prior    Constipation: Continue MiraLAX daily and senna S 1 tab two times a day PRN, nothing used    Disposition: Lives in memory care. [facility] 8/30        Electronically signed by: Sekou iSngletary NP

## 2021-06-05 NOTE — PROGRESS NOTES
[facility] Admission note      Patient: Cha Smith  MRN: [id number]  Date of Service: 1/29/2020      [facility] [[id number]]  Reason for Visit     Chief Complaint   Patient presents with     Review Of Multiple Medical Conditions       Code Status     DNR    Assessment     -History of a left femoral neck displaced and comminuted fracture status post left hip hemiarthroplasty on 1/13/2020  -Pain management  - ABLA with severe anemia noted on recheck with a hemoglobin of 7.4  -Hypotension noted in the TCU with low blood pressures  -History of acute hypoxic respiratory failure currently on 2 L  -History of aspiration pneumonitis  -DVT prophylaxiS  - Underlying history of chronic CLL with chronic leukocytosis felt to be stable- RC WBC AT 12.6 TODAY  -History of lung and breast cancer currently in remission  -History of COPD  -Chronic atrial fibrillation currently on Eliquis  - Profound dementia with patient alert and oriented only to self-   -Generalized weakness  -Malnutrition with a low albumin  -Hypocalcemia recheck calcium is 8.4    Plan     Patient admitted to the TCU for strengthening and rehab.  She is weightbearing as tolerated left lower extremity postoperatively with posterior hip precautions  Continue with her incisional cares  She is wheelchair-bound and making poor progress in therapy so far.  She has a follow-up with orthopedics today we will follow-up in the recommendations.  Recheck labs reviewed in the TCU and her hemoglobin is still low at 7.8 she is on iron supplementation.  Suspect this could be related to chronic anemia with the setting of CLL  She is on low-dose of prednisone daily  Blood pressures reviewed and she is on a low-dose of metoprolol 12.5 twice daily with stable blood pressures noted today.  Trazodone has been reduced to 25 mg at bedtime as needed  Due to poor intake she has been started on Ensure supplementation continue to monitor  weights  Continue with the PT OT and rehab      History     Patient is a very pleasant 86 y.o. female who is admitted to TCU  Patient is admitted with underlying history of dementia.  She is pleasantly confused and alert and oriented only to self. SLUMS 8/30 she remains confused but no significant behaviors reported by staff.  She remains confused with no behaviors on a low-dose of trazodone now    Patient admitted with a displaced comminuted left femoral neck fracture and underwent left hip hemiarthroplasty on 1/13/2020  She has tolerated the procedure well and has been discharged to the TCU for strengthening and rehab.  Unfortunately progress is slow and she remains wheelchair-bound follow-up with orthopedics as scheduled.    Postoperative course was complicated by acute hypoxic respiratory failure.  Currently she is on 2 L of oxygen by nasal cannula while in the hospital she needed as much as 8 L of oxygen.  It was felt to be secondary to underlying lung disease as well as from acute fracture and possibly with anemia.  She has been currently weaned off oxygen  She also was noted to have acute aspiration pneumonitis.  Imaging did show right upper lobe atelectasis with left upper lobe atelectasis with chronic pneumonitis.  As per her family this is chronic symptoms   She continues to some intermittent cough but no longer febrile    She has ongoing history of atrial fibrillation heart rates were stable apparently in the hospital was elected to continue with her current anticoagulation regimen  She has persistent anemia recheck hemoglobin is still low at 7.8.  Work-up was initiated due to presence of macrocytosis B12 and folate are normal  SPECT could be related to her chronic CLL    Past Medical History     Active Ambulatory (Non-Hospital) Problems    Diagnosis     Primary insomnia     CLL (chronic lymphocytic leukemia) (H)     Aspiration pneumonitis (H)     TIA (transient ischemic attack)     Closed displaced  fracture of left femoral neck (H)     Closed fracture of neck of left femur, initial encounter (H)     Atrial fibrillation with RVR (H)     COPD (chronic obstructive pulmonary disease) with acute bronchitis (H)     Moderate dementia with behavioral disturbance (H)     Acute deep vein thrombosis (DVT) of distal vein of left lower extremity (H)     Typical atrial flutter (H)     Adenocarcinoma, lung, left (H)     Hypertension     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 She has quit using smokeless tobacco. She reports previous alcohol use. She reports previous drug use.    Family History     Reviewed, and history of prostate cancer in the maternal grandfather.  Ovarian cancer in maternal aunt who  at age 70 his prostate cancer in maternal uncle in all 8 uncles had prostate cancer  Mother  from respiratory distress at age 79 father  from dementia and old age    Medication List   Post Discharge Medication Reconciliation Status: discharge medications reconciled and changed, per note/orders (see AVS)   Current Outpatient Medications on File Prior to Visit   Medication Sig Dispense Refill     acetaminophen (TYLENOL) 500 MG tablet Take 2 tablets (1,000 mg total) by mouth 3 (three) times a day. 100 tablet 0     albuterol (PROVENTIL) 2.5 mg /3 mL (0.083 %) nebulizer solution Take 3 mL (2.5 mg total) by nebulization 3 (three) times a day as needed for wheezing. 30 vial 0     albuterol (PROVENTIL) 2.5 mg /3 mL (0.083 %) nebulizer solution Take 3 mL (2.5 mg total) by nebulization 3 (three) times a day. 30 vial 0     apixaban  ANTICOAGULANT (ELIQUIS) 5 mg Tab tablet Take 1 tablet (5 mg total) by mouth 2 (two) times a day. 60 tablet 0     artificial tear,dxtrn-hpm-gly, (GENTEAL TEARS MODERATE) 0.1-0.3-0.2 % Drop ophthalmic drops Administer 1 drop to the right eye 2 (two) times a day. 1 Bottle 0     cholecalciferol, vitamin D3, 1,000 unit (25 mcg) tablet Take 1 tablet (1,000 Units total) by mouth daily. (Patient taking differently: Take 2,000 Units by mouth daily. ) 30 tablet 0     citalopram (CELEXA) 20 MG tablet Take 1 tablet (20 mg total) by mouth daily. 30 tablet 0     cyanocobalamin 1000 MCG tablet Take 1 tablet (1,000 mcg total) by mouth daily. 30 tablet 0     ferrous sulfate 325 (65 FE) MG tablet Take 1 tablet by mouth 2 (two) times a day.        furosemide (LASIX) 20 MG tablet Take 1 tablet (20 mg total) by mouth daily. 30 tablet 0     melatonin 10 mg Tab Take 10 mg by mouth at bedtime. 30 tablet 0     metoprolol succinate (TOPROL-XL) 50 MG 24 hr tablet Take 1 tablet (50 mg total) by mouth daily. (Patient taking differently: Take 12.5 mg by mouth daily. ) 30 tablet 0     polyethylene glycol (MIRALAX) 17 gram packet Take 1 packet (17 g total) by mouth daily. 30 packet 0     predniSONE (DELTASONE) 2.5 MG tablet Take 2.5 mg by mouth daily. 30 tablet 0     senna-docusate (SENNOSIDES-DOCUSATE SODIUM) 8.6-50 mg tablet Take 1 tablet by mouth 2 (two) times a day as needed for constipation. 30 tablet 0     traMADol (ULTRAM) 50 mg tablet Take 25 mg by mouth every 6 (six) hours as needed for pain.       traZODone (DESYREL) 50 MG tablet Take 1 tablet (50 mg total) by mouth daily as needed (anxiety). 30 tablet 0     umeclidinium-vilanterol (ANORO ELLIPTA) 62.5-25 mcg/actuation inhaler Inhale 1 puff daily. 1 Inhaler 0     No current facility-administered medications on file prior to visit.        Allergies     No Known Allergies    Review of Systems   A comprehensive review of 14 systems was done. Pertinent findings noted here and in history of  present illness. All the rest negative.  Constitutional: Negative.  Negative for fever, chills, she has activity change, appetite change and fatigue.  Losing weight not eating well at all eating less than 50% of them offered meal  HENT: Negative for congestion and facial swelling.    Eyes: Negative for photophobia, redness and visual disturbance.   Respiratory: Negative for cough and chest tightness.    Cardiovascular: Negative for chest pain, palpitations and leg swelling.   Gastrointestinal: Negative for nausea, diarrhea, constipation, blood in stool and abdominal distention.   Genitourinary: Negative.    Musculoskeletal: Negative.  Confused and complaining of low back pain  Skin: Negative.    Neurological: Negative for dizziness, tremors, syncope, weakness, light-headedness and headaches.   Hematological: Does not bruise/bleed easily.   Psychiatric/Behavioral:  impaired memory recall and judgment      Physical Exam     Recent Vitals 1/27/2020   Height -   Weight 166 lbs   BSA (m2) 1.87 m2   /60   Pulse 70   Temp 98   Temp src -   SpO2 97   Some recent data might be hidden     Recheck blood pressure one 93 /57 WT 167LB  Constitutional: Oriented to person,  and appears well-developed.  Patient is obese  On 2 L of oxygen by nasal cannula  HEENT:  Normocephalic and atraumatic.  Eyes: Conjunctivae and EOM are normal. Pupils are equal, round, and reactive to light. No discharge.  No scleral icterus. Nose normal. Mouth/Throat: Oropharynx is clear and moist. No oropharyngeal exudate.    NECK: Normal range of motion. Neck supple. No JVD present. No tracheal deviation present. No thyromegaly present.   CARDIOVASCULAR: Normal rate, regular rhythm and intact distal pulses.  Exam reveals no gallop and no friction rub.  Systolic murmur present.  PULMONARY: Effort normal and breath sounds normal. No respiratory distress.No Wheezing or rales.  ABDOMEN: Soft. Bowel sounds are normal. No distension and no mass.  There is no  tenderness. There is no rebound and no guarding. No HSM.  MUSCULOSKELETAL: Normal range of motion. No edema and no tenderness. Mild kyphosis, no tenderness.  Has an intact dressing over her left hip  LYMPH NODES: Has no cervical, supraclavicular, axillary and groin adenopathy.   NEUROLOGICAL: Alert and oriented to person, . No cranial nerve deficit.  Normal muscle tone. Coordination normal.   GENITOURINARY: Deferred exam.  SKIN: Skin is warm and dry. No rash noted. No erythema. No pallor.   EXTREMITIES: No cyanosis, no clubbing, no edema. No Deformity.  PSYCHIATRIC: Stable mood, affect and behavior.  Recall impaired judgment impaired memory is impaired      Lab Results     Recent Results (from the past 240 hour(s))   Basic Metabolic Panel    Collection Time: 01/20/20  8:22 AM   Result Value Ref Range    Sodium 143 136 - 145 mmol/L    Potassium 3.8 3.5 - 5.0 mmol/L    Chloride 105 98 - 107 mmol/L    CO2 32 (H) 22 - 31 mmol/L    Anion Gap, Calculation 6 5 - 18 mmol/L    Glucose 83 70 - 125 mg/dL    Calcium 8.4 (L) 8.5 - 10.5 mg/dL    BUN 17 8 - 28 mg/dL    Creatinine 0.65 0.60 - 1.10 mg/dL    GFR MDRD Af Amer >60 >60 mL/min/1.73m2    GFR MDRD Non Af Amer >60 >60 mL/min/1.73m2   Vitamin D, Total (25-Hydroxy)    Collection Time: 01/20/20  8:22 AM   Result Value Ref Range    Vitamin D, Total (25-Hydroxy) 28.7 (L) 30.0 - 80.0 ng/mL   HM2(CBC w/o Differential)    Collection Time: 01/20/20  8:22 AM   Result Value Ref Range    WBC 12.6 (H) 4.0 - 11.0 thou/uL    RBC 2.38 (L) 3.80 - 5.40 mill/uL    Hemoglobin 7.4 (L) 12.0 - 16.0 g/dL    Hematocrit 24.0 (L) 35.0 - 47.0 %     (H) 80 - 100 fL    MCH 31.1 27.0 - 34.0 pg    MCHC 30.8 (L) 32.0 - 36.0 g/dL    RDW 13.3 11.0 - 14.5 %    Platelets 337 140 - 440 thou/uL    MPV 9.7 8.5 - 12.5 fL   Hemoglobin    Collection Time: 01/23/20  5:18 AM   Result Value Ref Range    Hemoglobin 7.7 (L) 12.0 - 16.0 g/dL   HM2(CBC w/o Differential)    Collection Time: 01/27/20  4:30 AM   Result  Value Ref Range    WBC 12.1 (H) 4.0 - 11.0 thou/uL    RBC 2.50 (L) 3.80 - 5.40 mill/uL    Hemoglobin 7.8 (L) 12.0 - 16.0 g/dL    Hematocrit 26.5 (L) 35.0 - 47.0 %     (H) 80 - 100 fL    MCH 31.2 27.0 - 34.0 pg    MCHC 29.4 (L) 32.0 - 36.0 g/dL    RDW 16.2 (H) 11.0 - 14.5 %    Platelets 395 140 - 440 thou/uL    MPV 9.3 8.5 - 12.5 fL   Folate, Serum    Collection Time: 01/27/20  4:30 AM   Result Value Ref Range    Folate 11.6 >=3.5 ng/mL        Lab Results   Component Value Date    [id number] 1,056 (H) 10/16/2019     Lab Results   Component Value Date    TSH 2.39 10/16/2019           JUAN A Camacho

## 2021-06-05 NOTE — PROGRESS NOTES
[facility]      Facility:    [facility] [[id number]]  Code Status: DNR      Chief Complaint/Reason for Visit:   Chief Complaint   Patient presents with     Review Of Multiple Medical Conditions       HPI:   Cha is a 86 y.o. female who is a recent transfer from [facility] with an admission on 1/12/20 and discharge on 1/16/20.  She has a past medical history of dementia, A. 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  She underwent repair, given weightbearing as tolerated, Tylenol and tramadol for pain control.  Also developed ABLA with an initial hemoglobin at 12.6, at discharge 8.2.  Scheduled to follow-up with Ortho in 2 to 3 weeks.  Her CLL has chronic leukocytosis though may be contributing with steroid use.  She also had acute respiratory failure with hypoxia this history of aspiration pneumonitis, needed supplemental oxygen, has weaned down to 2 L.  Admission chest x-ray showed right upper lobe atelectasis, left upper lobe atelectasis likely due to chronic pneumonitis.  Per family was intermittently treated with antibiotics and steroid burst.  Recently completed course of Augmentin, her procalcitonin was negative no infectious symptoms noted.  We will continue to wean oxygen.  Per A. fib, family was consulted on ongoing anticoagulation though has deemed to continue, she is in normal sinus rhythm currently and she has normal EF.  She was then discharged to TCU for rehab.    Today will assess BP, weaning off O2 and recent ortho f/u and therapy progress.    Past Medical History:  Past Medical History:   Diagnosis Date     A-fib (H)      Breast cancer (H)      CLL (chronic lymphocytic  leukemia) (H)      COPD (chronic obstructive pulmonary disease) (H)      Dementia (H)      DVT (deep venous thrombosis) (H)      Hypertension      Lung cancer (H)            Surgical History:  Past Surgical History:   Procedure Laterality Date     BREAST BIOPSY       HERNIA REPAIR       HYSTERECTOMY       MASTECTOMY       RI PARTIAL HIP REPLACEMENT Left 1/13/2020    Procedure: HEMIARTHROPLASTY, HIP, UNIPOLAR;  Surgeon: Dav Steward MD;  Location: [facility];  Service: Orthopedics       Family History:   No family history on file.    Social History:    Social History     Socioeconomic History     Marital status:      Spouse name: Not on file     Number of children: Not on file     Years of education: Not on file     Highest education level: Not on file   Occupational History     Not on file   Social Needs     Financial resource strain: Not on file     Food insecurity:     Worry: Not on file     Inability: Not on file     Transportation needs:     Medical: Not on file     Non-medical: Not on file   Tobacco Use     Smoking status: Former Smoker     Smokeless tobacco: Former User   Substance and Sexual Activity     Alcohol use: Not Currently     Frequency: Never     Binge frequency: Never     Drug use: Not Currently     Sexual activity: Not Currently   Lifestyle     Physical activity:     Days per week: Not on file     Minutes per session: Not on file     Stress: Not on file   Relationships     Social connections:     Talks on phone: Not on file     Gets together: Not on file     Attends Sabianism service: Not on file     Active member of club or organization: Not on file     Attends meetings of clubs or organizations: Not on file     Relationship status: Not on file     Intimate partner violence:     Fear of current or ex partner: Not on file     Emotionally abused: Not on file     Physically abused: Not on file     Forced sexual activity: Not on file   Other Topics Concern     Not on file   Social  "History Narrative     Not on file          Review of Systems   Constitutional: Positive for activity change. Negative for appetite change, chills, diaphoresis and fatigue.        No acute issues   HENT: Positive for hearing loss. Negative for congestion.    Eyes: Negative.    Respiratory: Negative for shortness of breath and wheezing.    Cardiovascular: Positive for leg swelling.   Gastrointestinal: Negative for abdominal distention, abdominal pain, blood in stool, constipation and diarrhea.   Endocrine: Negative.    Genitourinary: Negative for difficulty urinating.   Musculoskeletal:        Denies pain   Skin: Positive for wound.        L hip incision   Allergic/Immunologic: Negative.    Neurological: Negative for dizziness, tremors, speech difficulty and light-headedness.   Hematological: Negative.    Psychiatric/Behavioral: Positive for confusion. Negative for agitation and hallucinations. The patient is not nervous/anxious.        Vitals:    02/03/20 0711   BP: 137/75   Pulse: 81   Resp: 16   Temp: 97  F (36.1  C)   SpO2: 96%   Weight: 170 lb (77.1 kg)   Height: 5' 6\" (1.676 m)       Physical Exam  Constitutional:       Comments: No acute issues   HENT:      Head: Normocephalic and atraumatic.      Right Ear: External ear normal.      Left Ear: External ear normal.      Mouth/Throat:      Pharynx: No oropharyngeal exudate or posterior oropharyngeal erythema.   Eyes:      General:         Right eye: No discharge.         Left eye: No discharge.   Neck:      Musculoskeletal: Normal range of motion and neck supple.   Cardiovascular:      Rate and Rhythm: Normal rate.      Heart sounds: No murmur. No friction rub. No gallop.       Comments: Hx of A-fib  Pulmonary:      Effort: No respiratory distress.      Breath sounds: No wheezing or rales.      Comments: Dim, MORE, RA  Abdominal:      General: There is no distension.      Palpations: Abdomen is soft.      Tenderness: There is no abdominal tenderness. There is no " guarding.      Comments: Denies constipation or diarrhea   Genitourinary:     Comments: incontinent  Musculoskeletal:      Right lower leg: Edema present.      Left lower leg: Edema present.      Comments: 1+BLE, wc bound, denies pain   Skin:     General: Skin is warm and dry.      Comments: L hip incision intact   Neurological:      Mental Status: She is alert. Mental status is at baseline. She is disoriented.      Comments: A/O x1   Psychiatric:         Mood and Affect: Mood normal.         Medication List:  Current Outpatient Medications   Medication Sig     calcium, as carbonate, (OS-MAGUI) 500 mg calcium (1,250 mg) tablet Take 1 tablet by mouth daily.     cholecalciferol, vitamin D3, 5,000 unit Tab Take 1 tablet by mouth daily.     acetaminophen (TYLENOL) 500 MG tablet Take 2 tablets (1,000 mg total) by mouth 3 (three) times a day.     albuterol (PROVENTIL) 2.5 mg /3 mL (0.083 %) nebulizer solution Take 3 mL (2.5 mg total) by nebulization 3 (three) times a day as needed for wheezing.     albuterol (PROVENTIL) 2.5 mg /3 mL (0.083 %) nebulizer solution Take 3 mL (2.5 mg total) by nebulization 3 (three) times a day.     apixaban ANTICOAGULANT (ELIQUIS) 5 mg Tab tablet Take 1 tablet (5 mg total) by mouth 2 (two) times a day.     artificial tear,dxtrn-hpm-gly, (GENTEAL TEARS MODERATE) 0.1-0.3-0.2 % Drop ophthalmic drops Administer 1 drop to the right eye 2 (two) times a day.     citalopram (CELEXA) 20 MG tablet Take 1 tablet (20 mg total) by mouth daily.     cyanocobalamin 1000 MCG tablet Take 1 tablet (1,000 mcg total) by mouth daily.     ferrous sulfate 325 (65 FE) MG tablet Take 1 tablet by mouth 2 (two) times a day.      furosemide (LASIX) 20 MG tablet Take 1 tablet (20 mg total) by mouth daily.     melatonin 10 mg Tab Take 10 mg by mouth at bedtime.     metoprolol succinate (TOPROL-XL) 50 MG 24 hr tablet Take 1 tablet (50 mg total) by mouth daily. (Patient taking differently: Take 12.5 mg by mouth daily. )      polyethylene glycol (MIRALAX) 17 gram packet Take 1 packet (17 g total) by mouth daily.     predniSONE (DELTASONE) 2.5 MG tablet Take 2.5 mg by mouth daily.     senna-docusate (SENNOSIDES-DOCUSATE SODIUM) 8.6-50 mg tablet Take 1 tablet by mouth 2 (two) times a day as needed for constipation.     traMADol (ULTRAM) 50 mg tablet Take 25 mg by mouth every 6 (six) hours as needed for pain.     umeclidinium-vilanterol (ANORO ELLIPTA) 62.5-25 mcg/actuation inhaler Inhale 1 puff daily.       Labs:  Results for orders placed or performed in visit on 01/20/20   Basic Metabolic Panel   Result Value Ref Range    Sodium 143 136 - 145 mmol/L    Potassium 3.8 3.5 - 5.0 mmol/L    Chloride 105 98 - 107 mmol/L    CO2 32 (H) 22 - 31 mmol/L    Anion Gap, Calculation 6 5 - 18 mmol/L    Glucose 83 70 - 125 mg/dL    Calcium 8.4 (L) 8.5 - 10.5 mg/dL    BUN 17 8 - 28 mg/dL    Creatinine 0.65 0.60 - 1.10 mg/dL    GFR MDRD Af Amer >60 >60 mL/min/1.73m2    GFR MDRD Non Af Amer >60 >60 mL/min/1.73m2     Lab Results   Component Value Date    WBC 12.1 (H) 01/27/2020    HGB 7.8 (L) 01/27/2020    HCT 26.5 (L) 01/27/2020     (H) 01/27/2020     01/27/2020     Lab Results   Component Value Date    TSH 2.39 10/16/2019     Vitamin D, Total (25-Hydroxy)   Date Value Ref Range Status   01/20/2020 28.7 (L) 30.0 - 80.0 ng/mL Final       Lab Results   Component Value Date    [id number] 1,056 (H) 10/16/2019       Assessment/Plan:    Commuted left femoral neck fracture status post left hip hemiarthroplasty on 1/13/2020: Given weightbearing as tolerated, increased supplementation, f/u in 4-6 wks    DVT prophylaxis with history of A. Fib: Continue Eliquis 5 mg twice daily    Pain control: Continue Tylenol 1000 mg 3 times daily, dc tramadol    ABLA: initial Hgb 12.6, last 7.7.  FeSO4 325mg increased to two times a day prior. Folate WNLs. Recheck CBC    Chronic CLL with leukocytosis and aspiration pneumonitis on chronic steroids: Continue prednisone  2.5 mg daily, last WBC 12.6.  Continue anoro.  Currently weaned off O2, continue albuterol nebs three times a day    History of aspiration: Speech evaluated, no change in diet    Vitamin D deficiency: increase D3 to 5000 units daily    Vitamin B 12 deficiency: Continue cyanocobalamin 1000mcg daily    Calcium supplement: increased to 1250mg daily    HTN: continue metoprolol succinate 12.5mg daily, SBP <130    HFpEF: stable per hospital report, continue lasix 20mg daily, last 166lb    Depression: Continue citalopram 20 mg daily     Insomnia: Continue melatonin 10 mg at bedtime. DC trazodone    Constipation: Continue MiraLAX daily and senna S 1 tab two times a day PRN, nothing used    Disposition: Lives in memory care. [facility] 8/30        Electronically signed by: Sekou Singletary NP

## 2021-06-05 NOTE — PROGRESS NOTES
[facility] Admission note      Patient: Cha Smith  MRN: [id number]  Date of Service: 2/5/2020      [facility] [[id number]]  Reason for Visit     Chief Complaint   Patient presents with     Review Of Multiple Medical Conditions       Code Status     DNR    Assessment     -History of a left femoral neck displaced and comminuted fracture status post left hip hemiarthroplasty on 1/13/2020  -Pain management  - ABLA with severe anemia noted on recheck with a hemoglobin of 7.4  -Hypotension noted in the TCU with low blood pressures  -History of acute hypoxic respiratory failure currently on 2 L; currently weaned off oxygen  -History of aspiration pneumonitis  -DVT prophylaxiS  - Underlying history of chronic CLL with chronic leukocytosis felt to be stable- RC WBC AT 12.6 TODAY  -History of lung and breast cancer currently in remission  -History of COPD  -Chronic atrial fibrillation currently on Eliquis  - Profound dementia with patient alert and oriented only to self-   -Generalized weakness  -Malnutrition with a low albumin  -Hypocalcemia recheck calcium is 8.4; now on supplementation    Plan     Patient admitted to the TCU for strengthening and rehab.  She is weightbearing as tolerated left lower extremity postoperatively with posterior hip precautions  Continue with her incisional cares  Follow-up done with orthopedics and she will continue weightbearing as tolerated on her left lower extremity with continued range of movement strength and gait training.  She is on Eliquis which will be continued.  Weaning of oxycodone recommended.  Calcium increased to 1200 mg daily.  Vitamin D increased to 5000 units daily  Taken off trazodone and seems to be sleeping well  She is currently on melatonin at bedtime  Tramadol has been discontinued to  Continue with the PT OT and rehab  Recheck CBC done shows significant improvement in hemoglobin up to 11, white count has improved to 11.4 down from  12.1 on last check  Monitor mood and behaviors SLUMS 8/30  Monitor weights due to poor oral intake so far stable at 167 pounds    History     Patient is a very pleasant 86 y.o. female who is admitted to TCU  Patient is admitted with underlying history of dementia.  She is pleasantly confused and alert and oriented only to self. SLUMS 8/30 she remains confused but no significant behaviors reported by staff.  She remains confused with no behaviors .  She has been weaned off trazodone    Patient admitted with a displaced comminuted left femoral neck fracture and underwent left hip hemiarthroplasty on 1/13/2020  Patient has had a follow-up with orthopedics they have recommended continuation of weightbearing as tolerated and gait and strength training    Postoperative course was complicated by acute hypoxic respiratory failure.  Currently she is on 2 L of oxygen by nasal cannula while in the hospital she needed as much as 8 L of oxygen.  It was felt to be secondary to underlying lung disease as well as from acute fracture and possibly with anemia.  She has been currently weaned off oxygen    She also was noted to have acute aspiration pneumonitis.  Imaging did show right upper lobe atelectasis with left upper lobe atelectasis with chronic pneumonitis.  As per her family this is chronic symptoms   She continues to some intermittent cough but no longer febrile    She has ongoing history of atrial fibrillation heart rates were stable apparently in the hospital was elected to continue with her current anticoagulation regimen  Recheck labs were reviewed and show significant improvement in her white count as well as her hemoglobin with an improvement in hemoglobin down to 11.    Past Medical History     Active Ambulatory (Non-Hospital) Problems    Diagnosis     Vitamin D deficiency     Primary insomnia     CLL (chronic lymphocytic leukemia) (H)     Aspiration pneumonitis (H)     TIA (transient ischemic attack)     Closed  displaced fracture of left femoral neck (H)     Closed fracture of neck of left femur, initial encounter (H)     Atrial fibrillation with RVR (H)     COPD (chronic obstructive pulmonary disease) with acute bronchitis (H)     Moderate dementia with behavioral disturbance (H)     Acute deep vein thrombosis (DVT) of distal vein of left lower extremity (H)     Typical atrial flutter (H)     Adenocarcinoma, lung, left (H)     Hypertension     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 She has quit using smokeless tobacco. She reports previous alcohol use. She reports previous drug use.    Family History     Reviewed, and history of prostate cancer in the maternal grandfather.  Ovarian cancer in maternal aunt who  at age 70 his prostate cancer in maternal uncle in all 8 uncles had prostate cancer  Mother  from respiratory distress at age 79 father  from dementia and old age    Medication List   Post Discharge Medication Reconciliation Status: discharge medications reconciled and changed, per note/orders (see AVS)   Current Outpatient Medications on File Prior to Visit   Medication Sig Dispense Refill     acetaminophen (TYLENOL) 500 MG tablet Take 2 tablets (1,000 mg total) by mouth 3 (three) times a day. 100 tablet 0     albuterol (PROVENTIL) 2.5 mg /3 mL (0.083 %) nebulizer solution Take 3 mL (2.5 mg total) by nebulization 3 (three) times a day as needed for wheezing. 30 vial 0     albuterol (PROVENTIL) 2.5 mg /3 mL (0.083 %) nebulizer solution Take 3 mL (2.5 mg total) by nebulization 3 (three) times a day. 30 vial 0      apixaban ANTICOAGULANT (ELIQUIS) 5 mg Tab tablet Take 1 tablet (5 mg total) by mouth 2 (two) times a day. 60 tablet 0     artificial tear,dxtrn-hpm-gly, (GENTEAL TEARS MODERATE) 0.1-0.3-0.2 % Drop ophthalmic drops Administer 1 drop to the right eye 2 (two) times a day. 1 Bottle 0     calcium, as carbonate, (OS-MAGUI) 500 mg calcium (1,250 mg) tablet Take 1 tablet by mouth daily.       cholecalciferol, vitamin D3, 5,000 unit Tab Take 1 tablet by mouth daily.       citalopram (CELEXA) 20 MG tablet Take 1 tablet (20 mg total) by mouth daily. 30 tablet 0     cyanocobalamin 1000 MCG tablet Take 1 tablet (1,000 mcg total) by mouth daily. 30 tablet 0     ferrous sulfate 325 (65 FE) MG tablet Take 1 tablet by mouth 2 (two) times a day.        furosemide (LASIX) 20 MG tablet Take 1 tablet (20 mg total) by mouth daily. 30 tablet 0     melatonin 10 mg Tab Take 10 mg by mouth at bedtime. 30 tablet 0     metoprolol succinate (TOPROL-XL) 50 MG 24 hr tablet Take 1 tablet (50 mg total) by mouth daily. (Patient taking differently: Take 12.5 mg by mouth daily. ) 30 tablet 0     polyethylene glycol (MIRALAX) 17 gram packet Take 1 packet (17 g total) by mouth daily. 30 packet 0     predniSONE (DELTASONE) 2.5 MG tablet Take 2.5 mg by mouth daily. 30 tablet 0     senna-docusate (SENNOSIDES-DOCUSATE SODIUM) 8.6-50 mg tablet Take 1 tablet by mouth 2 (two) times a day as needed for constipation. 30 tablet 0     traMADol (ULTRAM) 50 mg tablet Take 25 mg by mouth every 6 (six) hours as needed for pain.       umeclidinium-vilanterol (ANORO ELLIPTA) 62.5-25 mcg/actuation inhaler Inhale 1 puff daily. 1 Inhaler 0     No current facility-administered medications on file prior to visit.        Allergies     No Known Allergies    Review of Systems   A comprehensive review of 14 systems was done. Pertinent findings noted here and in history of present illness. All the rest negative.  Constitutional: Negative.  Negative for fever, chills, she has  "activity change, appetite change and fatigue.  Losing weight not eating well at all eating less than 50% of them offered meal  HENT: Negative for congestion and facial swelling.    Eyes: Negative for photophobia, redness and visual disturbance.   Respiratory: Negative for cough and chest tightness.    Cardiovascular: Negative for chest pain, palpitations and leg swelling.   Gastrointestinal: Negative for nausea, diarrhea, constipation, blood in stool and abdominal distention.   Genitourinary: Negative.    Musculoskeletal: Negative.  Confused and complaining of low back pain  Skin: Negative.    Neurological: Negative for dizziness, tremors, syncope, weakness, light-headedness and headaches.   Hematological: Does not bruise/bleed easily.   Psychiatric/Behavioral:  impaired memory recall and judgment      Physical Exam     Recent Vitals 2/3/2020   Height 5' 6\"   Weight 170 lbs   BSA (m2) 1.89 m2   /75   Pulse 81   Temp 97   Temp src -   SpO2 96   Some recent data might be hidden       Constitutional: Oriented to person,  and appears well-developed.  Patient is obese  HEENT:  Normocephalic and atraumatic.  Eyes: Conjunctivae and EOM are normal. Pupils are equal, round, and reactive to light. No discharge.  No scleral icterus. Nose normal. Mouth/Throat: Oropharynx is clear and moist. No oropharyngeal exudate.    NECK: Normal range of motion. Neck supple. No JVD present. No tracheal deviation present. No thyromegaly present.   CARDIOVASCULAR: Normal rate, regular rhythm and intact distal pulses.  Exam reveals no gallop and no friction rub.  Systolic murmur present.  PULMONARY: Effort normal and breath sounds normal. No respiratory distress.No Wheezing or rales.  ABDOMEN: Soft. Bowel sounds are normal. No distension and no mass.  There is no tenderness. There is no rebound and no guarding. No HSM.  MUSCULOSKELETAL: Normal range of motion. No edema and no tenderness. Mild kyphosis, no tenderness.  Has an intact " dressing over her left hip  LYMPH NODES: Has no cervical, supraclavicular, axillary and groin adenopathy.   NEUROLOGICAL: Alert and oriented to person, . No cranial nerve deficit.  Normal muscle tone. Coordination normal.   GENITOURINARY: Deferred exam.  SKIN: Skin is warm and dry. No rash noted. No erythema. No pallor.   EXTREMITIES: No cyanosis, no clubbing, no edema. No Deformity.  PSYCHIATRIC: Stable mood, affect and behavior.  Recall impaired judgment impaired memory is impaired      Lab Results     Recent Results (from the past 240 hour(s))   HM2(CBC w/o Differential)    Collection Time: 01/27/20  4:30 AM   Result Value Ref Range    WBC 12.1 (H) 4.0 - 11.0 thou/uL    RBC 2.50 (L) 3.80 - 5.40 mill/uL    Hemoglobin 7.8 (L) 12.0 - 16.0 g/dL    Hematocrit 26.5 (L) 35.0 - 47.0 %     (H) 80 - 100 fL    MCH 31.2 27.0 - 34.0 pg    MCHC 29.4 (L) 32.0 - 36.0 g/dL    RDW 16.2 (H) 11.0 - 14.5 %    Platelets 395 140 - 440 thou/uL    MPV 9.3 8.5 - 12.5 fL   Folate, Serum    Collection Time: 01/27/20  4:30 AM   Result Value Ref Range    Folate 11.6 >=3.5 ng/mL   HM2(CBC w/o Differential)    Collection Time: 02/04/20  7:37 AM   Result Value Ref Range    WBC 11.4 (H) 4.0 - 11.0 thou/uL    RBC 3.48 (L) 3.80 - 5.40 mill/uL    Hemoglobin 11.0 (L) 12.0 - 16.0 g/dL    Hematocrit 36.3 35.0 - 47.0 %     (H) 80 - 100 fL    MCH 31.6 27.0 - 34.0 pg    MCHC 30.3 (L) 32.0 - 36.0 g/dL    RDW 15.6 (H) 11.0 - 14.5 %    Platelets 385 140 - 440 thou/uL    MPV 9.6 8.5 - 12.5 fL        Lab Results   Component Value Date    XWUIXAQA62 1,056 (H) 10/16/2019     Lab Results   Component Value Date    TSH 2.39 10/16/2019           JUAN A Camacho

## 2021-06-05 NOTE — PROGRESS NOTES
[facility] Admission note      Patient: Cha Smith  MRN: [id number]  Date of Service: 1/20/2020      [facility] [[id number]]  Reason for Visit     Chief Complaint   Patient presents with     H & P       Code Status     DNR    Assessment     -History of a left femoral neck displaced and comminuted fracture status post left hip hemiarthroplasty on 1/13/2020  -Pain management  - ABLA with severe anemia noted on recheck with a hemoglobin of 7.4  -Hypotension noted in the TCU with low blood pressures  -History of acute hypoxic respiratory failure currently on 2 L  -History of aspiration pneumonitis  -DVT prophylaxiS  - Underlying history of chronic CLL with chronic leukocytosis felt to be stable- RC WBC AT 12.6 TODAY  -History of lung and breast cancer currently in remission  -History of COPD  -Chronic atrial fibrillation currently on Eliquis  - Profound dementia with patient alert and oriented only to self-   -Generalized weakness  -Malnutrition with a low albumin  -Hypocalcemia recheck calcium is 8.4    Plan     Patient admitted to the TCU for strengthening and rehab.  She is weightbearing as tolerated left lower extremity postoperatively with posterior hip precautions  Continue with her incisional cares  Unfortunately it is hard to assess because of profound cognitive impairment.  She is on scheduled Tylenol for pain but believes is for back pain she does not recall that she has a hip fracture.  She remains a high fall risk because of this reason.  She will probably not be compliant with her restrictions  Since postoperative course complicated by severe anemia hemoglobin dropped down to 8.2 on the day of discharge.  They have recommended close follow-up on her hemoglobin levels.  hemoglobin however is quite low at 7.4.  Recheck BMP is normal.  Due to anemia concern she will be started on iron sulfate 325 mg daily.  Plan on continue with iron supplementation and recheck hemoglobin  closely in 3 days consider transfusion if hemoglobin drops below 7  Staff also requested to guaiac stools and update if positive  Recheck hemoglobin again in 3 days.  She had leukocytosis with an admission white count of 17.8.  Discharge white count was 13.3.  This is felt to be secondary to underlying history of chronic CLL with leukocytosis.  She also has a history of lung and breast cancer.  R/C Check labs done today in the TCU were reviewed   white count is improved to 12.6   All these conditions are felt to be stable with no aggressive work-up required.  She developed hypoxic respiratory failure requiring as much as 8 L of oxygen currently discharged on 2L .  She will be weaned further down as tolerated.  She has underlying history of aspiration pneumonitis and generally gets antibiotics and steroids whenever she has a flare which was what was done in the hospital.  Currently she denies any coughing.  Unfortunately she is a poor historian because of profound dementia and is alert and oriented only to self.  [facility] 8/30 IN TCU  Monitor mood and behaviors closely  Metoprolol reduced to 25 mg daily due to low blood pressures.  Close monitoring of blood pressures recommended.  Continue to hold medications if blood pressures continue to run on the low side  Unfortunately heart rates are somewhat on the high side making discontinuation difficult.  Monitor heart rates closely also  Speech eval requested because of history of aspiration pneumonia  Patient at risk for nutritional compromise because of poor oral intake in the setting of cognitive impairment.  Closely monitor mood and behaviors.    History     Patient is a very pleasant 86 y.o. female who is admitted to TCU  Patient is admitted with underlying history of dementia.  She is pleasantly confused and alert and oriented only to self. [facility] 8/30    Patient admitted with a displaced comminuted left femoral neck fracture and underwent left hip hemiarthroplasty on  1/13/2020  She has tolerated the procedure well and has been discharged to the TCU for strengthening and rehab    Postoperative course was complicated by acute hypoxic respiratory failure.  Currently she is on 2 L of oxygen by nasal cannula while in the hospital she needed as much as 8 L of oxygen.  It was felt to be secondary to underlying lung disease as well as from acute fracture and possibly with anemia.  She also was noted to have acute aspiration pneumonitis.  Imaging did show right upper lobe atelectasis with left upper lobe atelectasis with chronic pneumonitis.  As per her family this is chronic symptoms and they are treated with antibiotics and steroids.  She did finish a course of Augmentin and has been discharged currently advised to wean off oxygen further as tolerated.    She has ongoing history of atrial fibrillation heart rates were stable apparently in the hospital was elected to continue with her current anticoagulation regimen  Unfortunately recheck labs were reviewed and she continues to be severely anemic hemoglobin actually has dropped further to 7.4.  She also is persistently showing low blood pressures recheck blood pressure in spite of low blood pressures were reviewed and is 101/54 she remains confused and unable to give any history    Past Medical History     Active Ambulatory (Non-Hospital) Problems    Diagnosis     Primary insomnia     CLL (chronic lymphocytic leukemia) (H)     Aspiration pneumonitis (H)     TIA (transient ischemic attack)     Closed displaced fracture of left femoral neck (H)     Closed fracture of neck of left femur, initial encounter (H)     Atrial fibrillation with RVR (H)     COPD (chronic obstructive pulmonary disease) with acute bronchitis (H)     Moderate dementia with behavioral disturbance (H)     Acute deep vein thrombosis (DVT) of distal vein of left lower extremity (H)     Typical atrial flutter (H)     Adenocarcinoma, lung, left (H)     Hypertension      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 She has quit using smokeless tobacco. She reports previous alcohol use. She reports previous drug use.    Family History     Reviewed, and history of prostate cancer in the maternal grandfather.  Ovarian cancer in maternal aunt who  at age 70 his prostate cancer in maternal uncle in all 8 uncles had prostate cancer  Mother  from respiratory distress at age 79 father  from dementia and old age    Medication List   Post Discharge Medication Reconciliation Status: discharge medications reconciled and changed, per note/orders (see AVS)   Current Outpatient Medications on File Prior to Visit   Medication Sig Dispense Refill     acetaminophen (TYLENOL) 500 MG tablet Take 2 tablets (1,000 mg total) by mouth 3 (three) times a day. 100 tablet 0     albuterol (PROVENTIL) 2.5 mg /3 mL (0.083 %) nebulizer solution Take 3 mL (2.5 mg total) by nebulization 3 (three) times a day as needed for wheezing. 30 vial 0     albuterol (PROVENTIL) 2.5 mg /3 mL (0.083 %) nebulizer solution Take 3 mL (2.5 mg total) by nebulization 3 (three) times a day. 30 vial 0     apixaban ANTICOAGULANT (ELIQUIS) 5 mg Tab tablet Take 1 tablet (5 mg total) by mouth 2 (two) times a day. 60 tablet 0     artificial tear,dxtrn-hpm-gly, (GENTEAL TEARS MODERATE) 0.1-0.3-0.2 % Drop ophthalmic drops Administer 1 drop to the right eye 2 (two) times a day. 1 Bottle 0     cholecalciferol, vitamin D3, 1,000 unit (25 mcg) tablet Take 1 tablet (1,000 Units total) by mouth daily. 30 tablet 0     citalopram (CELEXA) 20 MG tablet  Take 1 tablet (20 mg total) by mouth daily. 30 tablet 0     cyanocobalamin 1000 MCG tablet Take 1 tablet (1,000 mcg total) by mouth daily. 30 tablet 0     ferrous sulfate 325 (65 FE) MG tablet Take 1 tablet by mouth daily with breakfast.       furosemide (LASIX) 20 MG tablet Take 1 tablet (20 mg total) by mouth daily. 30 tablet 0     melatonin 10 mg Tab Take 10 mg by mouth at bedtime. 30 tablet 0     metoprolol succinate (TOPROL-XL) 50 MG 24 hr tablet Take 1 tablet (50 mg total) by mouth daily. (Patient taking differently: Take 25 mg by mouth daily. ) 30 tablet 0     polyethylene glycol (MIRALAX) 17 gram packet Take 1 packet (17 g total) by mouth daily. 30 packet 0     predniSONE (DELTASONE) 2.5 MG tablet Take 2.5 mg by mouth daily. 30 tablet 0     senna-docusate (SENNOSIDES-DOCUSATE SODIUM) 8.6-50 mg tablet Take 1 tablet by mouth 2 (two) times a day as needed for constipation. 30 tablet 0     traMADol (ULTRAM) 50 mg tablet Take 0.5 tablets (25 mg total) by mouth every 6 (six) hours as needed for severe pain (7-10). 8 tablet 0     traZODone (DESYREL) 50 MG tablet Take 1 tablet (50 mg total) by mouth daily as needed (anxiety). 30 tablet 0     umeclidinium-vilanterol (ANORO ELLIPTA) 62.5-25 mcg/actuation inhaler Inhale 1 puff daily. 1 Inhaler 0     No current facility-administered medications on file prior to visit.        Allergies     No Known Allergies    Review of Systems   A comprehensive review of 14 systems was done. Pertinent findings noted here and in history of present illness. All the rest negative.  Constitutional: Negative.  Negative for fever, chills, she has activity change, appetite change and fatigue.   HENT: Negative for congestion and facial swelling.    Eyes: Negative for photophobia, redness and visual disturbance.   Respiratory: Negative for cough and chest tightness.    Cardiovascular: Negative for chest pain, palpitations and leg swelling.   Gastrointestinal: Negative for nausea, diarrhea,  constipation, blood in stool and abdominal distention.   Genitourinary: Negative.    Musculoskeletal: Negative.  Confused and complaining of low back pain  Skin: Negative.    Neurological: Negative for dizziness, tremors, syncope, weakness, light-headedness and headaches.   Hematological: Does not bruise/bleed easily.   Psychiatric/Behavioral:  impaired memory recall and judgment      Physical Exam     Recent Vitals 1/17/2020   Height -   Weight -   BSA (m2) -   BP 97/56   Pulse 77   Temp 97   Temp src -   SpO2 95   Some recent data might be hidden     Recheck blood pressure one 101 /57  Constitutional: Oriented to person,  and appears well-developed.  Patient is obese  On 2 L of oxygen by nasal cannula  HEENT:  Normocephalic and atraumatic.  Eyes: Conjunctivae and EOM are normal. Pupils are equal, round, and reactive to light. No discharge.  No scleral icterus. Nose normal. Mouth/Throat: Oropharynx is clear and moist. No oropharyngeal exudate.    NECK: Normal range of motion. Neck supple. No JVD present. No tracheal deviation present. No thyromegaly present.   CARDIOVASCULAR: Normal rate, regular rhythm and intact distal pulses.  Exam reveals no gallop and no friction rub.  Systolic murmur present.  PULMONARY: Effort normal and breath sounds normal. No respiratory distress.No Wheezing or rales.  ABDOMEN: Soft. Bowel sounds are normal. No distension and no mass.  There is no tenderness. There is no rebound and no guarding. No HSM.  MUSCULOSKELETAL: Normal range of motion. No edema and no tenderness. Mild kyphosis, no tenderness.  Has an intact dressing over her left hip  LYMPH NODES: Has no cervical, supraclavicular, axillary and groin adenopathy.   NEUROLOGICAL: Alert and oriented to person, . No cranial nerve deficit.  Normal muscle tone. Coordination normal.   GENITOURINARY: Deferred exam.  SKIN: Skin is warm and dry. No rash noted. No erythema. No pallor.   EXTREMITIES: No cyanosis, no clubbing, no edema. No  Deformity.  PSYCHIATRIC: Normal mood, affect and behavior.  Recall impaired judgment impaired memory is impaired      Lab Results     Recent Results (from the past 240 hour(s))   INR    Collection Time: 01/12/20  5:06 PM   Result Value Ref Range    INR 1.14 (H) 0.90 - 1.10   APTT(PTT)    Collection Time: 01/12/20  5:06 PM   Result Value Ref Range    PTT 28 24 - 37 seconds   Basic Metabolic Panel    Collection Time: 01/12/20  5:06 PM   Result Value Ref Range    Sodium 141 136 - 145 mmol/L    Potassium 4.4 3.5 - 5.0 mmol/L    Chloride 106 98 - 107 mmol/L    CO2 25 22 - 31 mmol/L    Anion Gap, Calculation 10 5 - 18 mmol/L    Glucose 117 70 - 125 mg/dL    Calcium 9.2 8.5 - 10.5 mg/dL    BUN 22 8 - 28 mg/dL    Creatinine 0.83 0.60 - 1.10 mg/dL    GFR MDRD Af Amer >60 >60 mL/min/1.73m2    GFR MDRD Non Af Amer >60 >60 mL/min/1.73m2   Type and Screen    Collection Time: 01/12/20  5:06 PM   Result Value Ref Range    ABORh O NEG     Antibody Screen Negative Negative   HM1 (CBC with Diff)    Collection Time: 01/12/20  5:06 PM   Result Value Ref Range    WBC 17.8 (H) 4.0 - 11.0 thou/uL    RBC 4.11 3.80 - 5.40 mill/uL    Hemoglobin 12.6 12.0 - 16.0 g/dL    Hematocrit 39.1 35.0 - 47.0 %    MCV 95 80 - 100 fL    MCH 30.7 27.0 - 34.0 pg    MCHC 32.2 32.0 - 36.0 g/dL    RDW 12.6 11.0 - 14.5 %    Platelets 310 140 - 440 thou/uL    MPV 9.3 8.5 - 12.5 fL    Neutrophils % 59 50 - 70 %    Lymphocytes % 34 20 - 40 %    Monocytes % 5 2 - 10 %    Eosinophils % 0 0 - 6 %    Basophils % 0 0 - 2 %    Neutrophils Absolute 10.4 (H) 2.0 - 7.7 thou/uL    Lymphocytes Absolute 6.0 (H) 0.8 - 4.4 thou/uL    Monocytes Absolute 0.9 0.0 - 0.9 thou/uL    Eosinophils Absolute 0.1 0.0 - 0.4 thou/uL    Basophils Absolute 0.1 0.0 - 0.2 thou/uL   Procalcitonin    Collection Time: 01/12/20  5:06 PM   Result Value Ref Range    Procalcitonin 0.02 0.00 - 0.49 ng/mL   Magnesium    Collection Time: 01/12/20  5:06 PM   Result Value Ref Range    Magnesium 2.0 1.8 -  2.6 mg/dL   Lactic Acid    Collection Time: 01/13/20 12:05 AM   Result Value Ref Range    Lactic Acid 1.0 0.5 - 2.2 mmol/L   ECG 12 lead with MUSE    Collection Time: 01/13/20  2:36 AM   Result Value Ref Range    SYSTOLIC BLOOD PRESSURE      DIASTOLIC BLOOD PRESSURE      VENTRICULAR RATE 97 BPM    ATRIAL RATE 97 BPM    P-R INTERVAL 162 ms    QRS DURATION 76 ms    Q-T INTERVAL 398 ms    QTC CALCULATION (BEZET) 505 ms    P Axis 68 degrees    R AXIS -58 degrees    T AXIS 66 degrees    MUSE DIAGNOSIS       Normal sinus rhythm  Left axis deviation  Prolonged QT  Abnormal ECG  No previous ECGs available  Confirmed by LINA RAO MD LOC:WW ([id number]) on 1/13/2020 1:44:20 PM     Hemoglobin day of surgery    Collection Time: 01/14/20  6:18 AM   Result Value Ref Range    Hemoglobin 9.4 (L) 12.0 - 16.0 g/dL   Hemoglobin    Collection Time: 01/15/20  6:18 AM   Result Value Ref Range    Hemoglobin 8.7 (L) 12.0 - 16.0 g/dL   HM2(CBC w/o Differential)    Collection Time: 01/16/20  5:32 AM   Result Value Ref Range    WBC 13.3 (H) 4.0 - 11.0 thou/uL    RBC 2.68 (L) 3.80 - 5.40 mill/uL    Hemoglobin 8.2 (L) 12.0 - 16.0 g/dL    Hematocrit 26.0 (L) 35.0 - 47.0 %    MCV 97 80 - 100 fL    MCH 30.6 27.0 - 34.0 pg    MCHC 31.5 (L) 32.0 - 36.0 g/dL    RDW 12.9 11.0 - 14.5 %    Platelets 216 140 - 440 thou/uL    MPV 10.1 8.5 - 12.5 fL   Basic Metabolic Panel    Collection Time: 01/16/20  5:32 AM   Result Value Ref Range    Sodium 140 136 - 145 mmol/L    Potassium 4.0 3.5 - 5.0 mmol/L    Chloride 106 98 - 107 mmol/L    CO2 29 22 - 31 mmol/L    Anion Gap, Calculation 5 5 - 18 mmol/L    Glucose 113 70 - 125 mg/dL    Calcium 8.2 (L) 8.5 - 10.5 mg/dL    BUN 27 8 - 28 mg/dL    Creatinine 0.73 0.60 - 1.10 mg/dL    GFR MDRD Af Amer >60 >60 mL/min/1.73m2    GFR MDRD Non Af Amer >60 >60 mL/min/1.73m2            Imaging Results     Xr Chest 2 Views    Result Date: 1/1/2020  EXAM: XR CHEST 2 VIEWS LOCATION: [facility] DATE/TIME: 1/1/2020 2:21  AM INDICATION: weakness COMPARISON: 09/15/2019.     Shallow inspiration. Stable heart size. Aorta is atherosclerotic. Left-sided Port-A-Cath with tip SVC level. Bilateral areas of atelectasis or infiltrate greatest right upper lobe. Small left pleural effusion. Lung findings accentuated due to  level of inspiration. Demineralization. Lumbar compression deformity again noted. Bilateral axillary surgical clips. Limited study.     Ct Head Without Contrast    Result Date: 1/12/2020  [facility] CT HEAD WO CONTRAST, CT CERVICAL SPINE WO CONTRAST 1/12/2020 5:48 PM INDICATION: Head trauma, minor (Age > 65y), fall. TECHNIQUE: HEAD CT: Routine. Dose reduction techniques were used. CERVICAL SPINE CT: Axial images were obtained through the cervical spine and were evaluated in the axial plane with sagittal and coronal reformations. Dose reduction techniques were used. CONTRAST: None. COMPARISON:  01/01/2020 head CT. 05/30/2019 cervical spine CT. FINDINGS: HEAD CT: No skull fracture. No scalp hematoma. No intracranial hemorrhage, extraaxial collection, mass effect or CT evidence of acute infarct.  Severe presumed chronic small vessel ischemic changes. Mild generalized volume loss. The ventricles are proportional to the sulci. Calcification of the distal internal carotid arteries bilaterally. Mild hyperostosis frontalis interna is an incidental age-related finding. Anterior left ethmoid air cells are opacified. Mastoid air cells are clear. Postoperative changes to the bilateral lenses. CERVICAL SPINE CT: No acute fracture. No posttraumatic subluxation. Straightening of the usual cervical lordosis with minimal anterolisthesis of C6 on C7. Mild chronic vertebral body height loss along the anterior superior T1 and T2 endplates. Diffuse osteopenia. Severe atlantodental degenerative change. Moderate to severe C5-C6 and C6-C7 degenerative disc disease. Mild C4-C5 and C7-T1 degenerative disc disease. Multilevel severe left-sided  facet arthropathy with multilevel moderate right-sided facet arthropathy. Fusion at the left C4-C5 facets. 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 Small posterior disc osteophyte complexes at C5-C6 and C6-C7. Mild to moderate C5-C6 to C6-C7 spinal canal stenosis. Moderate to severe right and moderate left C5-C6 neural foraminal stenosis. Mild to moderate left C6-C7 neural foraminal stenosis. Incompletely visualized scarring at the medial right lung apex. Central venous access catheter partially visualized. Remainder negative.     CONCLUSION: HEAD CT: 1.  Stable age-related changes with no acute intracranial abnormality. CERVICAL SPINE CT: 1.  No fracture or post-traumatic subluxation. 2.  Osteopenia with multilevel degenerative change as above. Mild to moderate C5-C6 to C6-C7 spinal canal stenosis. Moderate to severe right and moderate left C5-C6 neural foraminal stenosis.     Ct Head Without Contrast    Result Date: 1/1/2020  EXAM: CT HEAD WO CONTRAST LOCATION: [facility] DATE/TIME: 1/1/2020 2:12 AM INDICATION: Head injury, altered mental status COMPARISON: 10/18/2019 TECHNIQUE: Routine without IV contrast. Multiplanar reformats. Dose reduction techniques were used. FINDINGS: INTRACRANIAL CONTENTS: No intracranial hemorrhage, extraaxial collection, or mass effect.  No CT evidence of acute infarct. Severe presumed chronic small vessel ischemic changes. Moderate generalized volume loss. No hydrocephalus. VISUALIZED ORBITS/SINUSES/MASTOIDS: No intraorbital abnormality. Opacification anterior aspect left ethmoid air cells. No middle ear or mastoid effusion. BONES/SOFT TISSUES: No acute abnormality.     1.  No acute intracranial process.    Ct Cervical Spine Without Contrast    Result Date: 1/12/2020  [facility] CT HEAD WO CONTRAST, CT CERVICAL SPINE WO CONTRAST 1/12/2020 5:48 PM INDICATION: Head trauma, minor (Age > 65y), fall. TECHNIQUE: HEAD CT: Routine. Dose reduction  techniques were used. CERVICAL SPINE CT: Axial images were obtained through the cervical spine and were evaluated in the axial plane with sagittal and coronal reformations. Dose reduction techniques were used. CONTRAST: None. COMPARISON:  01/01/2020 head CT. 05/30/2019 cervical spine CT. FINDINGS: HEAD CT: No skull fracture. No scalp hematoma. No intracranial hemorrhage, extraaxial collection, mass effect or CT evidence of acute infarct.  Severe presumed chronic small vessel ischemic changes. Mild generalized volume loss. The ventricles are proportional to the sulci. Calcification of the distal internal carotid arteries bilaterally. Mild hyperostosis frontalis interna is an incidental age-related finding. Anterior left ethmoid air cells are opacified. Mastoid air cells are clear. Postoperative changes to the bilateral lenses. CERVICAL SPINE CT: No acute fracture. No posttraumatic subluxation. Straightening of the usual cervical lordosis with minimal anterolisthesis of C6 on C7. Mild chronic vertebral body height loss along the anterior superior T1 and T2 endplates. Diffuse osteopenia. Severe atlantodental degenerative change. Moderate to severe C5-C6 and C6-C7 degenerative disc disease. Mild C4-C5 and C7-T1 degenerative disc disease. Multilevel severe left-sided facet arthropathy with multilevel moderate right-sided facet arthropathy. Fusion at the left C4-C5 facets. 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 Small posterior disc osteophyte complexes at C5-C6 and C6-C7. Mild to moderate C5-C6 to C6-C7 spinal canal stenosis. Moderate to severe right and moderate left C5-C6 neural foraminal stenosis. Mild to moderate left C6-C7 neural foraminal stenosis. Incompletely visualized scarring at the medial right lung apex. Central venous access catheter partially visualized. Remainder negative.     CONCLUSION: HEAD CT: 1.  Stable age-related changes with no acute intracranial abnormality. CERVICAL  SPINE CT: 1.  No fracture or post-traumatic subluxation. 2.  Osteopenia with multilevel degenerative change as above. Mild to moderate C5-C6 to C6-C7 spinal canal stenosis. Moderate to severe right and moderate left C5-C6 neural foraminal stenosis.     Xr Chest 1 View    Result Date: 1/12/2020  EXAM: XR CHEST 1 VIEW LOCATION: [facility] DATE/TIME: 1/12/2020 5:39 PM INDICATION: fall COMPARISON: 01/01/2020, 09/15/2019     Bilateral chest wall surgical clips. Left subclavian portacatheter tip projects over the mid SVC. Stable enlarged cardiac silhouette. Improved right upper lobe opacity suggests improving atelectasis. Slightly worsened left upper lobe opacity which could represent atelectasis, lung contusion, or pneumonia. No fracture seen.    Xr Hip Left 2 Or More Vws Portable    Result Date: 1/13/2020  EXAM: XR HIP LEFT 2 OR MORE VWS PORTABLE LOCATION: [facility] DATE/TIME: 1/13/2020 11:01 AM INDICATION: s/p left hip stepan COMPARISON: 1/12/2020.     There is a new left hip hemiarthroplasty. Components appear well seated and no complications are identified.     Xr Pelvis W 2 Vw Hip Left    Result Date: 1/12/2020  EXAM: XR PELVIS W 2 VW HIP LEFT LOCATION: [facility] DATE/TIME: 1/12/2020 5:40 PM INDICATION: fall, deformity COMPARISON: None.     Impacted left femoral neck fracture with varus angulation.            JUAN A Camacho

## 2021-06-05 NOTE — PROGRESS NOTES
[facility] Admission note      Patient: Cha Smith  MRN: [id number]  Date of Service: 1/22/2020      [facility] [[id number]]  Reason for Visit     Chief Complaint   Patient presents with     Review Of Multiple Medical Conditions       Code Status     DNR    Assessment     -History of a left femoral neck displaced and comminuted fracture status post left hip hemiarthroplasty on 1/13/2020  -Pain management  - ABLA with severe anemia noted on recheck with a hemoglobin of 7.4  -Hypotension noted in the TCU with low blood pressures  -History of acute hypoxic respiratory failure currently on 2 L  -History of aspiration pneumonitis  -DVT prophylaxiS  - Underlying history of chronic CLL with chronic leukocytosis felt to be stable- RC WBC AT 12.6 TODAY  -History of lung and breast cancer currently in remission  -History of COPD  -Chronic atrial fibrillation currently on Eliquis  - Profound dementia with patient alert and oriented only to self-   -Generalized weakness  -Malnutrition with a low albumin  -Hypocalcemia recheck calcium is 8.4    Plan     Patient admitted to the TCU for strengthening and rehab.  She is weightbearing as tolerated left lower extremity postoperatively with posterior hip precautions  Continue with her incisional cares  Unfortunately progress has been extremely slow due to cognitive decline.  She requires maximum cognitive assistance with most of her ADLs  She is currently wheelchair-bound  She is pleasantly confused.  SLUMS 8/30 and patient remains a poor historian due to that alert and oriented only to self  Oral intake remains minimal less than 50%.  Care plan reviewed with dietary and she will be started on Ensure supplementation  Monitor weights closely  Blood pressure review done and she remains hypotensive suspect this is due to poor oral intake  Toprol has been reduced to 25 mg daily.  Continue with hold parameters due to persistent low blood pressure  suspect due to poor oral intake.  Recheck hemoglobin pending for tomorrow monitor she is on iron supplementation  Continue with her PT OT and rehab  Wean her off oxygen as tolerated.    She is at significant risk of malnutrition due to poor oral intake    History     Patient is a very pleasant 86 y.o. female who is admitted to TCU  Patient is admitted with underlying history of dementia.  She is pleasantly confused and alert and oriented only to self. SLUMS 8/30 she remains confused but no significant behaviors reported by staff    Patient admitted with a displaced comminuted left femoral neck fracture and underwent left hip hemiarthroplasty on 1/13/2020  She has tolerated the procedure well and has been discharged to the TCU for strengthening and rehab.  Unfortunately progress is slow and she remains wheelchair-bound    Postoperative course was complicated by acute hypoxic respiratory failure.  Currently she is on 2 L of oxygen by nasal cannula while in the hospital she needed as much as 8 L of oxygen.  It was felt to be secondary to underlying lung disease as well as from acute fracture and possibly with anemia.  She is still dependent on oxygen  She also was noted to have acute aspiration pneumonitis.  Imaging did show right upper lobe atelectasis with left upper lobe atelectasis with chronic pneumonitis.  As per her family this is chronic symptoms and they are treated with antibiotics and steroids.  She did finish a course of Augmentin and has been discharged currently advised to wean off oxygen further as tolerated.  Does not have any current coughing or congestion    She has ongoing history of atrial fibrillation heart rates were stable apparently in the hospital was elected to continue with her current anticoagulation regimen  Unfortunately recheck labs were reviewed and she continues to be severely anemic hemoglobin actually has dropped further to 7.4.  She also is persistently showing low blood pressures  recheck blood pressure in spite of low blood pressures were reviewed and is 101/54 she remains confused and unable to give any history  She is now on a lower regimen of metoprolol with persistently low blood pressures  Oral intake has decreased and staff is reporting that patient is not eating well at all    Past Medical History     Active Ambulatory (Non-Hospital) Problems    Diagnosis     Primary insomnia     CLL (chronic lymphocytic leukemia) (H)     Aspiration pneumonitis (H)     TIA (transient ischemic attack)     Closed displaced fracture of left femoral neck (H)     Closed fracture of neck of left femur, initial encounter (H)     Atrial fibrillation with RVR (H)     COPD (chronic obstructive pulmonary disease) with acute bronchitis (H)     Moderate dementia with behavioral disturbance (H)     Acute deep vein thrombosis (DVT) of distal vein of left lower extremity (H)     Typical atrial flutter (H)     Adenocarcinoma, lung, left (H)     Hypertension     Bilateral malignant neoplasm of breast in female, estrogen receptor positive (H)     Past Medical History:   Diagnosis Date     A- (H)      Breast cancer (H)      CLL (chronic lymphocytic leukemia) (H)      COPD (chronic obstructive pulmonary disease) (H)      Dementia (H)      DVT (deep venous thrombosis) (H)      Hypertension      Lung cancer (H)        Past Social History     Reviewed, and she  reports that she has quit smoking. She has quit using smokeless tobacco. She reports previous alcohol use. She reports previous drug use.    Family History     Reviewed, and history of prostate cancer in the maternal grandfather.  Ovarian cancer in maternal aunt who  at age 70 his prostate cancer in maternal uncle in all 8 uncles had prostate cancer  Mother  from respiratory distress at age 79 father  from dementia and old age    Medication List   Post Discharge Medication Reconciliation Status: discharge medications reconciled and changed, per  note/orders (see AVS)   Current Outpatient Medications on File Prior to Visit   Medication Sig Dispense Refill     acetaminophen (TYLENOL) 500 MG tablet Take 2 tablets (1,000 mg total) by mouth 3 (three) times a day. 100 tablet 0     albuterol (PROVENTIL) 2.5 mg /3 mL (0.083 %) nebulizer solution Take 3 mL (2.5 mg total) by nebulization 3 (three) times a day as needed for wheezing. 30 vial 0     albuterol (PROVENTIL) 2.5 mg /3 mL (0.083 %) nebulizer solution Take 3 mL (2.5 mg total) by nebulization 3 (three) times a day. 30 vial 0     apixaban ANTICOAGULANT (ELIQUIS) 5 mg Tab tablet Take 1 tablet (5 mg total) by mouth 2 (two) times a day. 60 tablet 0     artificial tear,dxtrn-hpm-gly, (GENTEAL TEARS MODERATE) 0.1-0.3-0.2 % Drop ophthalmic drops Administer 1 drop to the right eye 2 (two) times a day. 1 Bottle 0     cholecalciferol, vitamin D3, 1,000 unit (25 mcg) tablet Take 1 tablet (1,000 Units total) by mouth daily. 30 tablet 0     citalopram (CELEXA) 20 MG tablet Take 1 tablet (20 mg total) by mouth daily. 30 tablet 0     cyanocobalamin 1000 MCG tablet Take 1 tablet (1,000 mcg total) by mouth daily. 30 tablet 0     ferrous sulfate 325 (65 FE) MG tablet Take 1 tablet by mouth daily with breakfast.       furosemide (LASIX) 20 MG tablet Take 1 tablet (20 mg total) by mouth daily. 30 tablet 0     melatonin 10 mg Tab Take 10 mg by mouth at bedtime. 30 tablet 0     metoprolol succinate (TOPROL-XL) 50 MG 24 hr tablet Take 1 tablet (50 mg total) by mouth daily. (Patient taking differently: Take 25 mg by mouth daily. ) 30 tablet 0     polyethylene glycol (MIRALAX) 17 gram packet Take 1 packet (17 g total) by mouth daily. 30 packet 0     predniSONE (DELTASONE) 2.5 MG tablet Take 2.5 mg by mouth daily. 30 tablet 0     senna-docusate (SENNOSIDES-DOCUSATE SODIUM) 8.6-50 mg tablet Take 1 tablet by mouth 2 (two) times a day as needed for constipation. 30 tablet 0     traMADol (ULTRAM) 50 mg tablet Take 0.5 tablets (25 mg total)  by mouth every 6 (six) hours as needed for severe pain (7-10). 8 tablet 0     traZODone (DESYREL) 50 MG tablet Take 1 tablet (50 mg total) by mouth daily as needed (anxiety). 30 tablet 0     umeclidinium-vilanterol (ANORO ELLIPTA) 62.5-25 mcg/actuation inhaler Inhale 1 puff daily. 1 Inhaler 0     No current facility-administered medications on file prior to visit.        Allergies     No Known Allergies    Review of Systems   A comprehensive review of 14 systems was done. Pertinent findings noted here and in history of present illness. All the rest negative.  Constitutional: Negative.  Negative for fever, chills, she has activity change, appetite change and fatigue.  Losing weight not eating well at all eating less than 50% of them offered meal  HENT: Negative for congestion and facial swelling.    Eyes: Negative for photophobia, redness and visual disturbance.   Respiratory: Negative for cough and chest tightness.    Cardiovascular: Negative for chest pain, palpitations and leg swelling.   Gastrointestinal: Negative for nausea, diarrhea, constipation, blood in stool and abdominal distention.   Genitourinary: Negative.    Musculoskeletal: Negative.  Confused and complaining of low back pain  Skin: Negative.    Neurological: Negative for dizziness, tremors, syncope, weakness, light-headedness and headaches.   Hematological: Does not bruise/bleed easily.   Psychiatric/Behavioral:  impaired memory recall and judgment      Physical Exam     Recent Vitals 1/17/2020   Height -   Weight -   BSA (m2) -   BP 97/56   Pulse 77   Temp 97   Temp src -   SpO2 95   Some recent data might be hidden     Recheck blood pressure one 93 /57 WT 167LB  Constitutional: Oriented to person,  and appears well-developed.  Patient is obese  On 2 L of oxygen by nasal cannula  HEENT:  Normocephalic and atraumatic.  Eyes: Conjunctivae and EOM are normal. Pupils are equal, round, and reactive to light. No discharge.  No scleral icterus. Nose  normal. Mouth/Throat: Oropharynx is clear and moist. No oropharyngeal exudate.    NECK: Normal range of motion. Neck supple. No JVD present. No tracheal deviation present. No thyromegaly present.   CARDIOVASCULAR: Normal rate, regular rhythm and intact distal pulses.  Exam reveals no gallop and no friction rub.  Systolic murmur present.  PULMONARY: Effort normal and breath sounds normal. No respiratory distress.No Wheezing or rales.  ABDOMEN: Soft. Bowel sounds are normal. No distension and no mass.  There is no tenderness. There is no rebound and no guarding. No HSM.  MUSCULOSKELETAL: Normal range of motion. No edema and no tenderness. Mild kyphosis, no tenderness.  Has an intact dressing over her left hip  LYMPH NODES: Has no cervical, supraclavicular, axillary and groin adenopathy.   NEUROLOGICAL: Alert and oriented to person, . No cranial nerve deficit.  Normal muscle tone. Coordination normal.   GENITOURINARY: Deferred exam.  SKIN: Skin is warm and dry. No rash noted. No erythema. No pallor.   EXTREMITIES: No cyanosis, no clubbing, no edema. No Deformity.  PSYCHIATRIC: Stable mood, affect and behavior.  Recall impaired judgment impaired memory is impaired      Lab Results     Recent Results (from the past 240 hour(s))   INR    Collection Time: 01/12/20  5:06 PM   Result Value Ref Range    INR 1.14 (H) 0.90 - 1.10   APTT(PTT)    Collection Time: 01/12/20  5:06 PM   Result Value Ref Range    PTT 28 24 - 37 seconds   Basic Metabolic Panel    Collection Time: 01/12/20  5:06 PM   Result Value Ref Range    Sodium 141 136 - 145 mmol/L    Potassium 4.4 3.5 - 5.0 mmol/L    Chloride 106 98 - 107 mmol/L    CO2 25 22 - 31 mmol/L    Anion Gap, Calculation 10 5 - 18 mmol/L    Glucose 117 70 - 125 mg/dL    Calcium 9.2 8.5 - 10.5 mg/dL    BUN 22 8 - 28 mg/dL    Creatinine 0.83 0.60 - 1.10 mg/dL    GFR MDRD Af Amer >60 >60 mL/min/1.73m2    GFR MDRD Non Af Amer >60 >60 mL/min/1.73m2   Type and Screen    Collection Time: 01/12/20   5:06 PM   Result Value Ref Range    ABORh O NEG     Antibody Screen Negative Negative   HM1 (CBC with Diff)    Collection Time: 01/12/20  5:06 PM   Result Value Ref Range    WBC 17.8 (H) 4.0 - 11.0 thou/uL    RBC 4.11 3.80 - 5.40 mill/uL    Hemoglobin 12.6 12.0 - 16.0 g/dL    Hematocrit 39.1 35.0 - 47.0 %    MCV 95 80 - 100 fL    MCH 30.7 27.0 - 34.0 pg    MCHC 32.2 32.0 - 36.0 g/dL    RDW 12.6 11.0 - 14.5 %    Platelets 310 140 - 440 thou/uL    MPV 9.3 8.5 - 12.5 fL    Neutrophils % 59 50 - 70 %    Lymphocytes % 34 20 - 40 %    Monocytes % 5 2 - 10 %    Eosinophils % 0 0 - 6 %    Basophils % 0 0 - 2 %    Neutrophils Absolute 10.4 (H) 2.0 - 7.7 thou/uL    Lymphocytes Absolute 6.0 (H) 0.8 - 4.4 thou/uL    Monocytes Absolute 0.9 0.0 - 0.9 thou/uL    Eosinophils Absolute 0.1 0.0 - 0.4 thou/uL    Basophils Absolute 0.1 0.0 - 0.2 thou/uL   Procalcitonin    Collection Time: 01/12/20  5:06 PM   Result Value Ref Range    Procalcitonin 0.02 0.00 - 0.49 ng/mL   Magnesium    Collection Time: 01/12/20  5:06 PM   Result Value Ref Range    Magnesium 2.0 1.8 - 2.6 mg/dL   Lactic Acid    Collection Time: 01/13/20 12:05 AM   Result Value Ref Range    Lactic Acid 1.0 0.5 - 2.2 mmol/L   ECG 12 lead with MUSE    Collection Time: 01/13/20  2:36 AM   Result Value Ref Range    SYSTOLIC BLOOD PRESSURE      DIASTOLIC BLOOD PRESSURE      VENTRICULAR RATE 97 BPM    ATRIAL RATE 97 BPM    P-R INTERVAL 162 ms    QRS DURATION 76 ms    Q-T INTERVAL 398 ms    QTC CALCULATION (BEZET) 505 ms    P Axis 68 degrees    R AXIS -58 degrees    T AXIS 66 degrees    MUSE DIAGNOSIS       Normal sinus rhythm  Left axis deviation  Prolonged QT  Abnormal ECG  No previous ECGs available  Confirmed by LINA RAO MD LOC:WW ([id number]) on 1/13/2020 1:44:20 PM     Hemoglobin day of surgery    Collection Time: 01/14/20  6:18 AM   Result Value Ref Range    Hemoglobin 9.4 (L) 12.0 - 16.0 g/dL   Hemoglobin    Collection Time: 01/15/20  6:18 AM   Result Value Ref Range     Hemoglobin 8.7 (L) 12.0 - 16.0 g/dL   HM2(CBC w/o Differential)    Collection Time: 01/16/20  5:32 AM   Result Value Ref Range    WBC 13.3 (H) 4.0 - 11.0 thou/uL    RBC 2.68 (L) 3.80 - 5.40 mill/uL    Hemoglobin 8.2 (L) 12.0 - 16.0 g/dL    Hematocrit 26.0 (L) 35.0 - 47.0 %    MCV 97 80 - 100 fL    MCH 30.6 27.0 - 34.0 pg    MCHC 31.5 (L) 32.0 - 36.0 g/dL    RDW 12.9 11.0 - 14.5 %    Platelets 216 140 - 440 thou/uL    MPV 10.1 8.5 - 12.5 fL   Basic Metabolic Panel    Collection Time: 01/16/20  5:32 AM   Result Value Ref Range    Sodium 140 136 - 145 mmol/L    Potassium 4.0 3.5 - 5.0 mmol/L    Chloride 106 98 - 107 mmol/L    CO2 29 22 - 31 mmol/L    Anion Gap, Calculation 5 5 - 18 mmol/L    Glucose 113 70 - 125 mg/dL    Calcium 8.2 (L) 8.5 - 10.5 mg/dL    BUN 27 8 - 28 mg/dL    Creatinine 0.73 0.60 - 1.10 mg/dL    GFR MDRD Af Amer >60 >60 mL/min/1.73m2    GFR MDRD Non Af Amer >60 >60 mL/min/1.73m2   Basic Metabolic Panel    Collection Time: 01/20/20  8:22 AM   Result Value Ref Range    Sodium 143 136 - 145 mmol/L    Potassium 3.8 3.5 - 5.0 mmol/L    Chloride 105 98 - 107 mmol/L    CO2 32 (H) 22 - 31 mmol/L    Anion Gap, Calculation 6 5 - 18 mmol/L    Glucose 83 70 - 125 mg/dL    Calcium 8.4 (L) 8.5 - 10.5 mg/dL    BUN 17 8 - 28 mg/dL    Creatinine 0.65 0.60 - 1.10 mg/dL    GFR MDRD Af Amer >60 >60 mL/min/1.73m2    GFR MDRD Non Af Amer >60 >60 mL/min/1.73m2   Vitamin D, Total (25-Hydroxy)    Collection Time: 01/20/20  8:22 AM   Result Value Ref Range    Vitamin D, Total (25-Hydroxy) 28.7 (L) 30.0 - 80.0 ng/mL   HM2(CBC w/o Differential)    Collection Time: 01/20/20  8:22 AM   Result Value Ref Range    WBC 12.6 (H) 4.0 - 11.0 thou/uL    RBC 2.38 (L) 3.80 - 5.40 mill/uL    Hemoglobin 7.4 (L) 12.0 - 16.0 g/dL    Hematocrit 24.0 (L) 35.0 - 47.0 %     (H) 80 - 100 fL    MCH 31.1 27.0 - 34.0 pg    MCHC 30.8 (L) 32.0 - 36.0 g/dL    RDW 13.3 11.0 - 14.5 %    Platelets 337 140 - 440 thou/uL    MPV 9.7 8.5 - 12.5 fL                 JUAN A Camacho

## 2021-06-05 NOTE — ANESTHESIA PREPROCEDURE EVALUATION
Anesthesia Evaluation      Patient summary reviewed   No history of anesthetic complications     Airway   Mallampati: II  Neck ROM: limited   Pulmonary    (+) COPD, wheezes, a smoker  (-) sleep apnea    ROS comment: Aspiration pneumonitis from immunotherapy on prednisone 2.5 mg daily     Hx of Lung CA: on sustained remission/last immuno ahd chemo in 2018/per daughter Terra, her last PET scan negative 7/19.                         Cardiovascular   Exercise tolerance: < 4 METS  (+) hypertension well controlled, dysrhythmias, ,     ECG reviewed (1/13/2020: left anterior fascicular block, NSR, QTc 505)  Rhythm: regular  Rate: normal,      ROS comment: Echo 9/16/2019:   1. Normal left ventricular size, borderline wall thickness, normal global systolic function, calculated EF of 65 %.   2. Right ventricular cavity size is normal, global systolic RV function is normal.   3. No significant valve disease detected.   4. The inferior vena cava is dilated, respiratory size variation greater than 50%.   5. Normal estimated pulmonary pressures by tricuspid regurgitation velocity and right atrial pressure (23 mmHg plus RAP).   6. Trivial pericardial effusion.   7. Compared to prior, no interval change.    Atrial fibrillation on Eliquis, last dose yesterday      Neuro/Psych    (+) CVA , dementia,   (-) no seizures    Comments: Remote hx of TIA, no residual deficits    Endo/Other    (-) no diabetes, hypothyroidism, no obesity     Comments: Chronic CLL with chronic leukocytosis: stage O. Not on Rx.     GI/Hepatic/Renal    (-) GERD     Other findings:   Dementia, oriented only to person   2 L oxygen supplementation via NC, SpO2 88-92%    Hbg 12.6  Plt 310  INR 1.14    K 4.4  BUN/Cr 22/0.83      Dental      Comment: Fair dentition, no loose or removable teeth                       Anesthesia Plan  Planned anesthetic: general endotracheal and total IV anesthesia  Pre-op nebulizer  GETA (Glidescope, ETT 7.0)  TIVA, Propofol + Precedex gtt    Access port-a-cath  Acetaminophen, Mg 4 g, lidocaine 1 mg/kg, Precedex gtt @ 0.7 mcg/kg/hr  Dexamethasone 4 mg  Do not administer benzodiazepines, opioids, ketamine or anticholinergics if possible    ASA 3     Anesthetic plan and risks discussed with: patient and child/children  Anesthesia plan special considerations: video-assisted, dexmedetomidine  Post-op plan: routine recovery  DNR/DNI status   DNR/DNI status discussed with patient and healthcare power of .  Plan is for suspension of DNR

## 2021-06-05 NOTE — PROGRESS NOTES
[facility]      Facility:    [facility] [[id number]]  Code Status: DNR      Chief Complaint/Reason for Visit:   Chief Complaint   Patient presents with     Review Of Multiple Medical Conditions       HPI:   Cha is a 86 y.o. female who is a recent transfer from [facility] with an admission on 1/12/20 and discharge on 1/16/20.  She has a past medical history of dementia, A. 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  She underwent repair, given weightbearing as tolerated, Tylenol and tramadol for pain control.  Also developed ABLA with an initial hemoglobin at 12.6, at discharge 8.2.  Scheduled to follow-up with Ortho in 2 to 3 weeks.  Her CLL has chronic leukocytosis though may be contributing with steroid use.  She also had acute respiratory failure with hypoxia this history of aspiration pneumonitis, needed supplemental oxygen, has weaned down to 2 L.  Admission chest x-ray showed right upper lobe atelectasis, left upper lobe atelectasis likely due to chronic pneumonitis.  Per family was intermittently treated with antibiotics and steroid burst.  Recently completed course of Augmentin, her procalcitonin was negative no infectious symptoms noted.  We will continue to wean oxygen.  Per A. fib, family was consulted on ongoing anticoagulation though has deemed to continue, she is in normal sinus rhythm currently and she has normal EF.  She was then discharged to TCU for rehab.    Past Medical History:  Past Medical History:   Diagnosis Date     A-fib (H)      Breast cancer (H)      CLL (chronic lymphocytic leukemia) (H)      COPD (chronic obstructive pulmonary disease) (H)      Dementia (H)       DVT (deep venous thrombosis) (H)      Hypertension      Lung cancer (H)            Surgical History:  Past Surgical History:   Procedure Laterality Date     BREAST BIOPSY       HERNIA REPAIR       HYSTERECTOMY       MASTECTOMY       HI PARTIAL HIP REPLACEMENT Left 1/13/2020    Procedure: HEMIARTHROPLASTY, HIP, UNIPOLAR;  Surgeon: Dav Steward MD;  Location: [facility];  Service: Orthopedics       Family History:   No family history on file.    Social History:    Social History     Socioeconomic History     Marital status:      Spouse name: Not on file     Number of children: Not on file     Years of education: Not on file     Highest education level: Not on file   Occupational History     Not on file   Social Needs     Financial resource strain: Not on file     Food insecurity:     Worry: Not on file     Inability: Not on file     Transportation needs:     Medical: Not on file     Non-medical: Not on file   Tobacco Use     Smoking status: Former Smoker     Smokeless tobacco: Former User   Substance and Sexual Activity     Alcohol use: Not Currently     Frequency: Never     Binge frequency: Never     Drug use: Not Currently     Sexual activity: Not Currently   Lifestyle     Physical activity:     Days per week: Not on file     Minutes per session: Not on file     Stress: Not on file   Relationships     Social connections:     Talks on phone: Not on file     Gets together: Not on file     Attends Adventist service: Not on file     Active member of club or organization: Not on file     Attends meetings of clubs or organizations: Not on file     Relationship status: Not on file     Intimate partner violence:     Fear of current or ex partner: Not on file     Emotionally abused: Not on file     Physically abused: Not on file     Forced sexual activity: Not on file   Other Topics Concern     Not on file   Social History Narrative     Not on file          Review of Systems   Constitutional:  "Positive for activity change, appetite change and fatigue. Negative for chills and diaphoresis.   HENT: Positive for hearing loss. Negative for congestion.    Eyes: Negative.    Respiratory: Negative for shortness of breath and wheezing.    Cardiovascular: Positive for leg swelling.   Gastrointestinal: Negative for abdominal distention, abdominal pain, blood in stool, constipation and diarrhea.   Endocrine: Negative.    Genitourinary: Negative for difficulty urinating.   Musculoskeletal:        Denies pain   Skin: Positive for wound.        L hip incision   Allergic/Immunologic: Negative.    Neurological: Negative for dizziness, tremors, speech difficulty and light-headedness.   Hematological: Negative.    Psychiatric/Behavioral: Positive for confusion and hallucinations. Negative for agitation. The patient is not nervous/anxious.         States that she is \"in heaven\"       Vitals:    01/17/20 1700   BP: 97/56   Pulse: 77   Resp: 18   Temp: 97  F (36.1  C)   SpO2: 95%       Physical Exam  HENT:      Head: Normocephalic and atraumatic.      Right Ear: External ear normal.      Left Ear: External ear normal.      Mouth/Throat:      Pharynx: No oropharyngeal exudate or posterior oropharyngeal erythema.   Eyes:      General:         Right eye: No discharge.         Left eye: No discharge.   Neck:      Musculoskeletal: Normal range of motion and neck supple.   Cardiovascular:      Rate and Rhythm: Normal rate.      Heart sounds: No murmur. No friction rub. No gallop.       Comments: Hx of A-fib  Pulmonary:      Effort: No respiratory distress.      Breath sounds: No wheezing or rales.      Comments: Dim, 2L NC, MORE  Abdominal:      General: There is no distension.      Palpations: Abdomen is soft.      Tenderness: There is no abdominal tenderness. There is no guarding.      Comments: Denies constipation or diarrhea   Genitourinary:     Comments: incontinent  Musculoskeletal:      Right lower leg: Edema present.      " Left lower leg: Edema present.      Comments: 1+BLE, wc bound, denies pain   Skin:     General: Skin is warm and dry.      Comments: L hip incision intact   Neurological:      Mental Status: She is alert. She is disoriented.      Comments: Thinks she is in heaven, A/O x1   Psychiatric:         Mood and Affect: Mood normal.         Medication List:  Current Outpatient Medications   Medication Sig     ferrous sulfate 325 (65 FE) MG tablet Take 1 tablet by mouth daily with breakfast.     acetaminophen (TYLENOL) 500 MG tablet Take 2 tablets (1,000 mg total) by mouth 3 (three) times a day.     albuterol (PROVENTIL) 2.5 mg /3 mL (0.083 %) nebulizer solution Take 3 mL (2.5 mg total) by nebulization 3 (three) times a day as needed for wheezing.     albuterol (PROVENTIL) 2.5 mg /3 mL (0.083 %) nebulizer solution Take 3 mL (2.5 mg total) by nebulization 3 (three) times a day.     apixaban ANTICOAGULANT (ELIQUIS) 5 mg Tab tablet Take 1 tablet (5 mg total) by mouth 2 (two) times a day.     artificial tear,dxtrn-hpm-gly, (GENTEAL TEARS MODERATE) 0.1-0.3-0.2 % Drop ophthalmic drops Administer 1 drop to the right eye 2 (two) times a day.     cholecalciferol, vitamin D3, 1,000 unit (25 mcg) tablet Take 1 tablet (1,000 Units total) by mouth daily.     citalopram (CELEXA) 20 MG tablet Take 1 tablet (20 mg total) by mouth daily.     cyanocobalamin 1000 MCG tablet Take 1 tablet (1,000 mcg total) by mouth daily.     furosemide (LASIX) 20 MG tablet Take 1 tablet (20 mg total) by mouth daily.     melatonin 10 mg Tab Take 10 mg by mouth at bedtime.     metoprolol succinate (TOPROL-XL) 50 MG 24 hr tablet Take 1 tablet (50 mg total) by mouth daily. (Patient taking differently: Take 25 mg by mouth daily. )     polyethylene glycol (MIRALAX) 17 gram packet Take 1 packet (17 g total) by mouth daily.     predniSONE (DELTASONE) 2.5 MG tablet Take 2.5 mg by mouth daily.     senna-docusate (SENNOSIDES-DOCUSATE SODIUM) 8.6-50 mg tablet Take 1 tablet  by mouth 2 (two) times a day as needed for constipation.     traMADol (ULTRAM) 50 mg tablet Take 0.5 tablets (25 mg total) by mouth every 6 (six) hours as needed for severe pain (7-10).     traZODone (DESYREL) 50 MG tablet Take 1 tablet (50 mg total) by mouth daily as needed (anxiety).     umeclidinium-vilanterol (ANORO ELLIPTA) 62.5-25 mcg/actuation inhaler Inhale 1 puff daily.       Labs:  Results for orders placed or performed during the hospital encounter of 01/12/20   Basic Metabolic Panel   Result Value Ref Range    Sodium 140 136 - 145 mmol/L    Potassium 4.0 3.5 - 5.0 mmol/L    Chloride 106 98 - 107 mmol/L    CO2 29 22 - 31 mmol/L    Anion Gap, Calculation 5 5 - 18 mmol/L    Glucose 113 70 - 125 mg/dL    Calcium 8.2 (L) 8.5 - 10.5 mg/dL    BUN 27 8 - 28 mg/dL    Creatinine 0.73 0.60 - 1.10 mg/dL    GFR MDRD Af Amer >60 >60 mL/min/1.73m2    GFR MDRD Non Af Amer >60 >60 mL/min/1.73m2     Lab Results   Component Value Date    WBC 13.3 (H) 01/16/2020    HGB 8.2 (L) 01/16/2020    HCT 26.0 (L) 01/16/2020    MCV 97 01/16/2020     01/16/2020     Lab Results   Component Value Date    TSH 2.39 10/16/2019     No results found for: [id number]    Lab Results   Component Value Date    [id number] 1,056 (H) 10/16/2019       Assessment/Plan:    Commuted left femoral neck fracture status post left hip hemiarthroplasty on 1/13/2020: Given weightbearing as tolerated, incisional cares as directed, follow-up with Ortho in 2 to 3 weeks    DVT prophylaxis with history of A. Fib: Continue Eliquis 5 mg twice daily    Pain control: Continue Tylenol 1000 mg 3 times daily, tramadol 25 mg every 6 hours as needed (no doses given). Denies pain    ABLA: initial Hgb 12.6, last 8.2. Start FeSO4 325mg daily. Recheck CBC on 1/20    Chronic CLL with leukocytosis and aspiration pneumonitis on chronic steroids: Continue prednisone 2.5 mg daily, last WBC 13.3, recheck CBC.  Continue anoro.  Currently on 2 L nasal cannula, continue  albuterol nebs three times a day, will wean both as tolerated    History of aspiration: Speech to evaluate and treat    Vitamin D deficiency: Continue D3 1000 units daily, recheck    Vitamin B 12 deficiency: Continue cyanocobalamin 1000mcg daily    HTN: decrease metoprolol succinate to 25mg daily per SBP <110, monitor BP/HR two times a day x1 wk    HFpEF: stable per hospital report, continue lasix 20mg daily, no current wt    Depression: Continue citalopram 20 mg daily     Insomnia: Continue melatonin 10 mg at bedtime and trazodone 50 mg at bedtime PRN (plan to dc next visit if not used)    Constipation: Continue MiraLAX daily and senna S 1 tab two times a day PRN    Disposition: Lives in memory care.  Pending cognitive assessment    The care plan has been reviewed and all orders signed. Changes to care plan, if any, as noted. Otherwise, continue care plan of care.  The total time spent with this patient was 35 minutes, with greater than 50% spent in counseling and coordination of care that included multiple issues per discussion about being in heaven, pain control and therapy, which lasted 18 minutes.     Post Discharge Medication Reconciliation Status: discharge medications reconciled and changed, per note/orders (see AVS)    Electronically signed by: Sekou Singletary NP

## 2021-06-05 NOTE — PROGRESS NOTES
[facility]      Facility:    [facility] [[id number]]  Code Status: DNR      Chief Complaint/Reason for Visit:   Chief Complaint   Patient presents with     Review Of Multiple Medical Conditions       HPI:   Cha is a 86 y.o. female who is a recent transfer from [facility] with an admission on 1/12/20 and discharge on 1/16/20.  She has a past medical history of dementia, A. 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  She underwent repair, given weightbearing as tolerated, Tylenol and tramadol for pain control.  Also developed ABLA with an initial hemoglobin at 12.6, at discharge 8.2.  Scheduled to follow-up with Ortho in 2 to 3 weeks.  Her CLL has chronic leukocytosis though may be contributing with steroid use.  She also had acute respiratory failure with hypoxia this history of aspiration pneumonitis, needed supplemental oxygen, has weaned down to 2 L.  Admission chest x-ray showed right upper lobe atelectasis, left upper lobe atelectasis likely due to chronic pneumonitis.  Per family was intermittently treated with antibiotics and steroid burst.  Recently completed course of Augmentin, her procalcitonin was negative no infectious symptoms noted.  We will continue to wean oxygen.  Per A. fib, family was consulted on ongoing anticoagulation though has deemed to continue, she is in normal sinus rhythm currently and she has normal EF.  She was then discharged to TCU for rehab.    Today will assess labs, BP, weaning off O2 and therapy progress.    Past Medical History:  Past Medical History:   Diagnosis Date     A-fib (H)      Breast cancer (H)      CLL (chronic lymphocytic leukemia) (H)       COPD (chronic obstructive pulmonary disease) (H)      Dementia (H)      DVT (deep venous thrombosis) (H)      Hypertension      Lung cancer (H)            Surgical History:  Past Surgical History:   Procedure Laterality Date     BREAST BIOPSY       HERNIA REPAIR       HYSTERECTOMY       MASTECTOMY       RI PARTIAL HIP REPLACEMENT Left 1/13/2020    Procedure: HEMIARTHROPLASTY, HIP, UNIPOLAR;  Surgeon: Dav Steward MD;  Location: [facility];  Service: Orthopedics       Family History:   No family history on file.    Social History:    Social History     Socioeconomic History     Marital status:      Spouse name: Not on file     Number of children: Not on file     Years of education: Not on file     Highest education level: Not on file   Occupational History     Not on file   Social Needs     Financial resource strain: Not on file     Food insecurity:     Worry: Not on file     Inability: Not on file     Transportation needs:     Medical: Not on file     Non-medical: Not on file   Tobacco Use     Smoking status: Former Smoker     Smokeless tobacco: Former User   Substance and Sexual Activity     Alcohol use: Not Currently     Frequency: Never     Binge frequency: Never     Drug use: Not Currently     Sexual activity: Not Currently   Lifestyle     Physical activity:     Days per week: Not on file     Minutes per session: Not on file     Stress: Not on file   Relationships     Social connections:     Talks on phone: Not on file     Gets together: Not on file     Attends Scientologist service: Not on file     Active member of club or organization: Not on file     Attends meetings of clubs or organizations: Not on file     Relationship status: Not on file     Intimate partner violence:     Fear of current or ex partner: Not on file     Emotionally abused: Not on file     Physically abused: Not on file     Forced sexual activity: Not on file   Other Topics Concern     Not on file   Social History Narrative      Not on file          Review of Systems   Constitutional: Positive for activity change and fatigue. Negative for appetite change, chills and diaphoresis.   HENT: Positive for hearing loss. Negative for congestion.    Eyes: Negative.    Respiratory: Negative for shortness of breath and wheezing.    Cardiovascular: Positive for leg swelling.   Gastrointestinal: Negative for abdominal distention, abdominal pain, blood in stool, constipation and diarrhea.   Endocrine: Negative.    Genitourinary: Negative for difficulty urinating.   Musculoskeletal:        Denies pain   Skin: Positive for wound.        L hip incision   Allergic/Immunologic: Negative.    Neurological: Negative for dizziness, tremors, speech difficulty and light-headedness.   Hematological: Negative.    Psychiatric/Behavioral: Positive for confusion. Negative for agitation and hallucinations. The patient is not nervous/anxious.        Vitals:    01/27/20 0820   BP: 115/60   Pulse: 70   Resp: 18   Temp: 98  F (36.7  C)   SpO2: 97%   Weight: 166 lb (75.3 kg)       Physical Exam  Constitutional:       Comments: No acute issues   HENT:      Head: Normocephalic and atraumatic.      Right Ear: External ear normal.      Left Ear: External ear normal.      Mouth/Throat:      Pharynx: No oropharyngeal exudate or posterior oropharyngeal erythema.   Eyes:      General:         Right eye: No discharge.         Left eye: No discharge.   Neck:      Musculoskeletal: Normal range of motion and neck supple.   Cardiovascular:      Rate and Rhythm: Normal rate.      Heart sounds: No murmur. No friction rub. No gallop.       Comments: Hx of A-fib  Pulmonary:      Effort: No respiratory distress.      Breath sounds: No wheezing or rales.      Comments: Dim, 2L NC, MORE  Abdominal:      General: There is no distension.      Palpations: Abdomen is soft.      Tenderness: There is no abdominal tenderness. There is no guarding.      Comments: Denies constipation or diarrhea    Genitourinary:     Comments: incontinent  Musculoskeletal:      Right lower leg: Edema present.      Left lower leg: Edema present.      Comments: 1+BLE, wc bound, denies pain   Skin:     General: Skin is warm and dry.      Comments: L hip incision intact   Neurological:      Mental Status: She is alert. Mental status is at baseline. She is disoriented.      Comments: A/O x1   Psychiatric:         Mood and Affect: Mood normal.         Medication List:  Current Outpatient Medications   Medication Sig     acetaminophen (TYLENOL) 500 MG tablet Take 2 tablets (1,000 mg total) by mouth 3 (three) times a day.     albuterol (PROVENTIL) 2.5 mg /3 mL (0.083 %) nebulizer solution Take 3 mL (2.5 mg total) by nebulization 3 (three) times a day as needed for wheezing.     albuterol (PROVENTIL) 2.5 mg /3 mL (0.083 %) nebulizer solution Take 3 mL (2.5 mg total) by nebulization 3 (three) times a day.     apixaban ANTICOAGULANT (ELIQUIS) 5 mg Tab tablet Take 1 tablet (5 mg total) by mouth 2 (two) times a day.     artificial tear,dxtrn-hpm-gly, (GENTEAL TEARS MODERATE) 0.1-0.3-0.2 % Drop ophthalmic drops Administer 1 drop to the right eye 2 (two) times a day.     cholecalciferol, vitamin D3, 1,000 unit (25 mcg) tablet Take 1 tablet (1,000 Units total) by mouth daily.     citalopram (CELEXA) 20 MG tablet Take 1 tablet (20 mg total) by mouth daily.     cyanocobalamin 1000 MCG tablet Take 1 tablet (1,000 mcg total) by mouth daily.     ferrous sulfate 325 (65 FE) MG tablet Take 1 tablet by mouth 2 (two) times a day.      furosemide (LASIX) 20 MG tablet Take 1 tablet (20 mg total) by mouth daily.     melatonin 10 mg Tab Take 10 mg by mouth at bedtime.     metoprolol succinate (TOPROL-XL) 50 MG 24 hr tablet Take 1 tablet (50 mg total) by mouth daily. (Patient taking differently: Take 25 mg by mouth daily. )     polyethylene glycol (MIRALAX) 17 gram packet Take 1 packet (17 g total) by mouth daily.     predniSONE (DELTASONE) 2.5 MG tablet  Take 2.5 mg by mouth daily.     senna-docusate (SENNOSIDES-DOCUSATE SODIUM) 8.6-50 mg tablet Take 1 tablet by mouth 2 (two) times a day as needed for constipation.     traMADol (ULTRAM) 50 mg tablet Take 0.5 tablets (25 mg total) by mouth every 6 (six) hours as needed for severe pain (7-10).     traZODone (DESYREL) 50 MG tablet Take 1 tablet (50 mg total) by mouth daily as needed (anxiety).     umeclidinium-vilanterol (ANORO ELLIPTA) 62.5-25 mcg/actuation inhaler Inhale 1 puff daily.       Labs:  Results for orders placed or performed in visit on 01/20/20   Basic Metabolic Panel   Result Value Ref Range    Sodium 143 136 - 145 mmol/L    Potassium 3.8 3.5 - 5.0 mmol/L    Chloride 105 98 - 107 mmol/L    CO2 32 (H) 22 - 31 mmol/L    Anion Gap, Calculation 6 5 - 18 mmol/L    Glucose 83 70 - 125 mg/dL    Calcium 8.4 (L) 8.5 - 10.5 mg/dL    BUN 17 8 - 28 mg/dL    Creatinine 0.65 0.60 - 1.10 mg/dL    GFR MDRD Af Amer >60 >60 mL/min/1.73m2    GFR MDRD Non Af Amer >60 >60 mL/min/1.73m2     Lab Results   Component Value Date    WBC 12.6 (H) 01/20/2020    HGB 7.7 (L) 01/23/2020    HCT 24.0 (L) 01/20/2020     (H) 01/20/2020     01/20/2020     Lab Results   Component Value Date    TSH 2.39 10/16/2019     Vitamin D, Total (25-Hydroxy)   Date Value Ref Range Status   01/20/2020 28.7 (L) 30.0 - 80.0 ng/mL Final       Lab Results   Component Value Date    [id number] 1,056 (H) 10/16/2019       Assessment/Plan:    Commuted left femoral neck fracture status post left hip hemiarthroplasty on 1/13/2020: Given weightbearing as tolerated, incisional cares as directed, follow-up with Ortho as ordered    DVT prophylaxis with history of A. Fib: Continue Eliquis 5 mg twice daily    Pain control: Continue Tylenol 1000 mg 3 times daily, continue tramadol 25mg q6h PRN (using daily)    ABLA: initial Hgb 12.6, last 7.7.  FeSO4 325mg increased to BID. Recheck CBC/folate on 1/27    Chronic CLL with leukocytosis and aspiration  pneumonitis on chronic steroids: Continue prednisone 2.5 mg daily, last WBC 12.6.  Continue anoro.  Currently on 2 L nasal cannula, continue albuterol nebs three times a day, will wean both as tolerated. Sats >97%    History of aspiration: Speech evaluated, no change in diet    Vitamin D deficiency: increase D3 to 2000 units daily    Vitamin B 12 deficiency: Continue cyanocobalamin 1000mcg daily    HTN: decrease metoprolol succinate to 12.5mg daily per SBP <110    HFpEF: stable per hospital report, continue lasix 20mg daily, last 166lb    Depression: Continue citalopram 20 mg daily     Insomnia: Continue melatonin 10 mg at bedtime and will decrease trazodone to 25mg at HS PRN    Constipation: Continue MiraLAX daily and senna S 1 tab two times a day PRN, nothing used    Disposition: Lives in memory care. [facility] 8/30        Electronically signed by: Sekou Singletary NP

## 2021-06-05 NOTE — ANESTHESIA POSTPROCEDURE EVALUATION
Patient: Cha Smith  HEMIARTHROPLASTY, HIP, UNIPOLAR  Anesthesia type: general    Patient location: PACU  Last vitals:   Vitals Value Taken Time   /59 1/13/2020  1:30 PM   Temp 36.3  C (97.3  F) 1/13/2020  1:30 PM   Pulse 85 1/13/2020  1:30 PM   Resp 18 1/13/2020  1:30 PM   SpO2 93 % 1/13/2020  1:30 PM     Post vital signs: stable  Level of consciousness: awake and responds to simple questions  Post-anesthesia pain: pain controlled  Post-anesthesia nausea and vomiting: no  Pulmonary: unassisted, return to baseline  Cardiovascular: stable and blood pressure at baseline  Hydration: adequate  Anesthetic events: no    QCDR Measures:  ASA# 11 - Radha-op Cardiac Arrest: ASA11B - Patient did NOT experience unanticipated cardiac arrest  ASA# 12 - Radha-op Mortality Rate: ASA12B - Patient did NOT die  ASA# 13 - PACU Re-Intubation Rate: ASA13B - Patient did NOT require a new airway mgmt  ASA# 10 - Composite Anes Safety: ASA10A - No serious adverse event    Additional Notes:

## 2021-06-20 NOTE — LETTER
Letter by Sekou Singletary NP at      Author: Sekou Singletary NP Service: -- Author Type: --    Filed:  Encounter Date: 2/11/2020 Status: (Other)         Patient: Cha Smith   MR Number: [id number]   YOB: 1933   Date of Visit: 2/11/2020     [facility]      Facility:    [facility] [[id number]]  Code Status: DNR      Chief Complaint/Reason for Visit:  101  Chief Complaint   Patient presents with   ? Discharge Summary       HPI:   Cha is a 86 y.o. female who is a recent transfer from [facility] with an admission on 1/12/20 and discharge on 1/16/20.  She has a past medical history of dementia, A. 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  She underwent repair, given weightbearing as tolerated, Tylenol and tramadol for pain control.  Also developed ABLA with an initial hemoglobin at 12.6, at discharge 8.2.  Scheduled to follow-up with Ortho in 2 to 3 weeks.  Her CLL has chronic leukocytosis though may be contributing with steroid use.  She also had acute respiratory failure with hypoxia this history of aspiration pneumonitis, needed supplemental oxygen, has weaned down to 2 L.  Admission chest x-ray showed right upper lobe atelectasis, left upper lobe atelectasis likely due to chronic pneumonitis.  Per family was intermittently treated with antibiotics and steroid burst.  Recently completed course of Augmentin, her procalcitonin was negative no infectious symptoms noted.  We will continue to wean oxygen.  Per A. fib, family was consulted on ongoing anticoagulation though has deemed to continue, she is in normal sinus rhythm currently and she has normal EF.  She was then  discharged to TCU for rehab.    She has concluded her TCU stay and will be discharged to [facility] with services on 2/13/20.    Past Medical History:  Past Medical History:   Diagnosis Date   ? A-fib (H)    ? Breast cancer (H)    ? CLL (chronic lymphocytic leukemia) (H)    ? COPD (chronic obstructive pulmonary disease) (H)    ? Dementia (H)    ? DVT (deep venous thrombosis) (H)    ? Hypertension    ? Lung cancer (H)            Surgical History:  Past Surgical History:   Procedure Laterality Date   ? BREAST BIOPSY     ? HERNIA REPAIR     ? HYSTERECTOMY     ? MASTECTOMY     ? NM PARTIAL HIP REPLACEMENT Left 1/13/2020    Procedure: HEMIARTHROPLASTY, HIP, UNIPOLAR;  Surgeon: Dav Steward MD;  [place] [facility] [place]  Service: [place]opedics       Family History:   No family history on file.    Social History:    Social History     Socioeconomic History   ? Marital status:      Spouse name: Not on file   ? Number of children: Not on file   ? Years of education: Not on file   ? Highest education level: Not on file   Occupational History   ? Not on file   Social Needs   ? Financial resource strain: Not on file   ? Food insecurity:     Worry: Not on file     Inability: Not on file   ? Transportation needs:     Medical: Not on file     Non-medical: Not on file   Tobacco Use   ? Smoking status: Former Smoker   ? Smokeless tobacco: Former User   Substance and Sexual Activity   ? Alcohol use: Not Currently     Frequency: Never     Binge frequency: Never   ? Drug use: Not Currently   ? Sexual activity: Not Currently   Lifestyle   ? Physical activity:     Days per week: Not on file     Minutes per session: Not on file   ? Stress: Not on file   Relationships   ? Social connections:     Talks on phone: Not on file     Gets together: Not on file     Attends L[employer] day service: Not on file     Active member of club or organization: Not on file     Attends meetings of clubs or  organizations: Not on file     Relationship status: Not on file   ? Intimate partner violence:     Fear of current or ex partner: Not on file     Emotionally abused: Not on file     Physically abused: Not on file     Forced sexual activity: Not on file   Other Topics Concern   ? Not on file   Social History Narrative   ? Not on file          Review of Systems   Constitutional: Positive for activity change. Negative for appetite change, chills, diaphoresis and fatigue.        No acute issues   HENT: Positive for hearing loss. Negative for congestion.    Eyes: Negative.    Respiratory: Negative for shortness of breath and wheezing.    Cardiovascular: Positive for leg swelling.   Gastrointestinal: Negative for abdominal distention, abdominal pain, blood in stool, constipation and diarrhea.   Endocrine: Negative.    Genitourinary: Negative for difficulty urinating.   Musculoskeletal:        Denies pain   Skin: Negative for wound.        Hx of L hip incision   Allergic/Immunologic: Negative.    Neurological: Negative for dizziness, tremors, speech difficulty and light-headedness.   Hematological: Negative.    Psychiatric/Behavioral: Positive for confusion. Negative for agitation and hallucinations. The patient is not nervous/anxious.        Vitals:    02/11/20 1647   BP: 138/72   Pulse: 70   Resp: 16   Temp: 97  F (36.1  C)   SpO2: 93%   Weight: 167 lb (75.8 kg)       Physical Exam  Constitutional:       Comments: No acute issues   HENT:      Head: Normocephalic and atraumatic.      Right Ear: External ear normal.      Left Ear: External ear normal.      Mouth/Throat:      Pharynx: No oropharyngeal exudate or posterior oropharyngeal erythema.   Eyes:      General:         Right eye: No discharge.         Left eye: No discharge.   Neck:      Musculoskeletal: Normal range of motion and neck supple.   Cardiovascular:      Rate and Rhythm: Normal rate.      Heart sounds: No murmur. No friction rub. No gallop.       Comments:  Hx of A-fib  Pulmonary:      Effort: No respiratory distress.      Breath sounds: No wheezing or rales.      Comments: Dim, MORE, RA  Abdominal:      General: There is no distension.      Palpations: Abdomen is soft.      Tenderness: There is no abdominal tenderness. There is no guarding.      Comments: Denies constipation or diarrhea   Genitourinary:     Comments: incontinent  Musculoskeletal:      Right lower leg: Edema present.      Left lower leg: Edema present.      Comments: 1+BLE, wc bound, denies pain   Skin:     General: Skin is warm and dry.      Comments: L hip incision intact   Neurological:      Mental Status: She is alert. Mental status is at baseline. She is disoriented.      Comments: A/O x1   Psychiatric:         Mood and Affect: Mood normal.         Medication List:  Current Outpatient Medications   Medication Sig   ? acetaminophen (TYLENOL) 500 MG tablet Take 2 tablets (1,000 mg total) by mouth 3 (three) times a day.   ? albuterol (PROVENTIL) 2.5 mg /3 mL (0.083 %) nebulizer solution Take 3 mL (2.5 mg total) by nebulization 3 (three) times a day as needed for wheezing.   ? albuterol (PROVENTIL) 2.5 mg /3 mL (0.083 %) nebulizer solution Take 3 mL (2.5 mg total) by nebulization 3 (three) times a day.   ? apixaban ANTICOAGULANT (ELIQUIS) 5 mg Tab tablet Take 1 tablet (5 mg total) by mouth 2 (two) times a day.   ? artificial tear,dxtrn-hpm-gly, (GENTEAL TEARS MODERATE) 0.1-0.3-0.2 % Drop ophthalmic drops Administer 1 drop to the right eye 2 (two) times a day.   ? calcium, as carbonate, (OS-MAGUI) 500 mg calcium (1,250 mg) tablet Take 1 tablet by mouth daily.   ? cholecalciferol, vitamin D3, 5,000 unit Tab Take 1 tablet by mouth daily.   ? citalopram (CELEXA) 20 MG tablet Take 1 tablet (20 mg total) by mouth daily.   ? cyanocobalamin 1000 MCG tablet Take 1 tablet (1,000 mcg total) by mouth daily.   ? ferrous sulfate 325 (65 FE) MG tablet Take 1 tablet by mouth daily with breakfast.    ? furosemide (LASIX)  20 MG tablet Take 1 tablet (20 mg total) by mouth daily.   ? melatonin 10 mg Tab Take 10 mg by mouth at bedtime.   ? metoprolol succinate (TOPROL-XL) 50 MG 24 hr tablet Take 1 tablet (50 mg total) by mouth daily. (Patient taking differently: Take 12.5 mg by mouth daily. )   ? polyethylene glycol (MIRALAX) 17 gram packet Take 1 packet (17 g total) by mouth daily.   ? predniSONE (DELTASONE) 2.5 MG tablet Take 2.5 mg by mouth daily.   ? senna-docusate (SENNOSIDES-DOCUSATE SODIUM) 8.6-50 mg tablet Take 1 tablet by mouth 2 (two) times a day as needed for constipation.   ? umeclidinium-vilanterol (ANORO ELLIPTA) 62.5-25 mcg/actuation inhaler Inhale 1 puff daily.       Labs:  Results for orders placed or performed in visit on 01/20/20   Basic Metabolic Panel   Result Value Ref Range    Sodium 143 136 - 145 mmol/L    Potassium 3.8 3.5 - 5.0 mmol/L    Chloride 105 98 - 107 mmol/L    CO2 32 (H) 22 - 31 mmol/L    Anion Gap, Calculation 6 5 - 18 mmol/L    Glucose 83 70 - 125 mg/dL    Calcium 8.4 (L) 8.5 - 10.5 mg/dL    BUN 17 8 - 28 mg/dL    Creatinine 0.65 0.60 - 1.10 mg/dL    GFR MDRD Af Amer >60 >60 mL/min/1.73m2    GFR MDRD Non Af Amer >60 >60 mL/min/1.73m2     Lab Results   Component Value Date    WBC 11.4 (H) 02/04/2020    HGB 11.0 (L) 02/04/2020    HCT 36.3 02/04/2020     (H) 02/04/2020     02/04/2020     Lab Results   Component Value Date    TSH 2.39 10/16/2019     Vitamin D, Total (25-Hydroxy)   Date Value Ref Range Status   01/20/2020 28.7 (L) 30.0 - 80.0 ng/mL Final       Lab Results   Component Value Date    [id number] 1,056 (H) 10/16/2019       Assessment/Plan:    Commuted left femoral neck fracture status post left hip hemiarthroplasty on 1/13/2020: Given weightbearing as tolerated, increased supplementation, f/u with ortho in several weeks    DVT prophylaxis with history of A. Fib: Continue Eliquis 5 mg twice daily    Dysphagia and cognition: speech following    Pain control: Continue Tylenol 1000  mg 3 times daily, dc'd tramadol, denies pain    ABLA: initial Hgb 12.6, last 11.0 on 2/4/20, decrease FeSO4 325mg to daily. Folate WNLs.  Recheck CBC with PCP    Chronic CLL with leukocytosis and aspiration pneumonitis on chronic steroids: Continue prednisone 2.5 mg daily, last WBC 11.4.  Continue anoro.  Weaned off O2, continue albuterol nebs three times a day    History of aspiration: Speech evaluated, no change in diet    Vitamin D deficiency: continue D3 5000 units daily    Vitamin B 12 deficiency: Continue cyanocobalamin 1000mcg daily    Calcium supplement: increased to 1250mg daily    HTN: continue metoprolol succinate 12.5mg daily, SBP <130    HFpEF: stable per hospital report, continue lasix 20mg daily, last 167lb, stable    Depression: Continue citalopram 20 mg daily     Insomnia: Continue melatonin 10 mg at bedtime. DC'd trazodone prior    Constipation: Continue MiraLAX daily and senna S 1 tab two times a day PRN, nothing used    Disposition: Will be returning to memory care with services on 2/13/20. [facility] 8/30      MEDICAL EQUIPMENT NEEDS:  na    DISCHARGE PLAN/FACE TO FACE:  I certify that services are/were furnished while this patient was under the care of a physician and that a physician or an allowed non-physician practitioner (NPP), had a face-to-face encounter that meets the physician face-to-face encounter requirements. The encounter was in whole, or in part, related to the primary reason for home health. The patient is confined to his/her home and needs intermittent skilled nursing, physical therapy, speech-language pathology, or the continued need for occupational therapy. A plan of care has been established by a physician and is periodically reviewed by a physician.  Date of Face-to-Face Encounter: 2/11/20    I certify that, based on my findings, the following services are medically necessary home health services: [facility] ST and OT/OT to evaluate and treat.    My clinical findings support the need  for the above skilled services because: patient will be discharging to [facility].  Patient needs ongoing speech care assistance and performing IADLs and ADLs effectively and safely.    Patient to re-establish plan of care with their PCP within 7 days after leaving TCU.     The care plan has been reviewed and all orders signed. Changes to care plan, if any, as noted. Otherwise, continue care plan of care.  The total time spent with this patient was 31 minutes, with greater than 50% spent in counseling and coordination of care that included multiple issues per ongoing nebs, pain control and continuing therapy, which lasted 16 minutes.      Electronically signed by: Sekou Singletary NP

## 2021-06-20 NOTE — LETTER
Letter by Qing Leon MBBS at      Author: Qing Leon MBBS Service: -- Author Type: --    Filed:  Encounter Date: 2/5/2020 Status: (Other)         Patient: Cha Smith   MR Number: [id number]   YOB: 1933   Date of Visit: 2/5/2020       [facility] Admission note      Patient: Cha Smith  MRN: [id number]  Date of Service: 2/5/2020      [facility] [[id number]]  Reason for Visit     Chief Complaint   Patient presents with   ? Review Of Multiple Medical Conditions       Code Status     DNR    Assessment     -History of a left femoral neck displaced and comminuted fracture status post left hip hemiarthroplasty on 1/13/2020  -Pain management  - ABLA with severe anemia noted on recheck with a hemoglobin of 7.4  -Hypotension noted in the TCU with low blood pressures  -History of acute hypoxic respiratory failure currently on 2 L; currently weaned off oxygen  -History of aspiration pneumonitis  -DVT prophylaxiS  - Underlying history of chronic CLL with chronic leukocytosis felt to be stable- RC WBC AT 12.6 TODAY  -History of lung and breast cancer currently in remission  -History of COPD  -Chronic atrial fibrillation currently on Eliquis  - Profound dementia with patient alert and oriented only to self-   -Generalized weakness  -Malnutrition with a low albumin  -Hypocalcemia recheck calcium is 8.4; now on supplementation    Plan     Patient admitted to the TCU for strengthening and rehab.  She is weightbearing as tolerated left lower extremity postoperatively with posterior hip precautions  Continue with her incisional cares  Follow-up done with orthopedics and she will continue weightbearing as tolerated on her left lower extremity with continued range of movement strength and gait training.  She is on Eliquis which will be continued.  Weaning of oxycodone recommended.  Calcium increased to 1200 mg daily.  Vitamin D increased to 5000 units daily  Taken off  trazodone and seems to be sleeping well  She is currently on melatonin at bedtime  Tramadol has been discontinued to  Continue with the PT OT and rehab  Recheck CBC done shows significant improvement in hemoglobin up to 11, white count has improved to 11.4 down from 12.1 on last check  Monitor mood and behaviors SLUMS 8/30  Monitor weights due to poor oral intake so far stable at 167 pounds    History     Patient is a very pleasant 86 y.o. female who is admitted to TCU  Patient is admitted with underlying history of dementia.  She is pleasantly confused and alert and oriented only to self. SLUMS 8/30 she remains confused but no significant behaviors reported by staff.  She remains confused with no behaviors .  She has been weaned off trazodone    Patient admitted with a displaced comminuted left femoral neck fracture and underwent left hip hemiarthroplasty on 1/13/2020  Patient has had a follow-up with orthopedics they have recommended continuation of weightbearing as tolerated and gait and strength training    Postoperative course was complicated by acute hypoxic respiratory failure.  Currently she is on 2 L of oxygen by nasal cannula while in the hospital she needed as much as 8 L of oxygen.  It was felt to be secondary to underlying lung disease as well as from acute fracture and possibly with anemia.  She has been currently weaned off oxygen    She also was noted to have acute aspiration pneumonitis.  Imaging did show right upper lobe atelectasis with left upper lobe atelectasis with chronic pneumonitis.  As per her family this is chronic symptoms   She continues to some intermittent cough but no longer febrile    She has ongoing history of atrial fibrillation heart rates were stable apparently in the hospital was elected to continue with her current anticoagulation regimen  Recheck labs were reviewed and show significant improvement in her white count as well as her hemoglobin with an improvement in hemoglobin  down to 11.    Past Medical History     Active Ambulatory (Non-Hospital) Problems    Diagnosis   ? Vitamin D deficiency   ? Primary insomnia   ? CLL (chronic lymphocytic leukemia) (H)   ? Aspiration pneumonitis (H)   ? TIA (transient ischemic attack)   ? Closed displaced fracture of left femoral neck (H)   ? Closed fracture of neck of left femur, initial encounter (H)   ? Atrial fibrillation with RVR (H)   ? COPD (chronic obstructive pulmonary disease) with acute bronchitis (H)   ? Moderate dementia with behavioral disturbance (H)   ? Acute deep vein thrombosis (DVT) of distal vein of left lower extremity (H)   ? Typical atrial flutter (H)   ? Adenocarcinoma, lung, left (H)   ? Hypertension   ? 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 A-fib (H)    ? Breast cancer (H)    ? CLL (chronic lymphocytic leukemia) (H)    ? COPD (chronic obstructive pulmonary disease) (H)    ? Dementia (H)    ? DVT (deep venous thrombosis) (H)    ? Hypertension    ? Lung cancer (H)        Past Social History     Reviewed, and she  reports that she has quit smoking. She has quit using smokeless tobacco. She reports previous alcohol use. She reports previous drug use.    Family History     Reviewed, and history of prostate cancer in the maternal grandfather.  Ovarian cancer in maternal aunt who  at age 70 his prostate cancer in maternal uncle in all 8 uncles had prostate cancer  Mother  from respiratory distress at age 79 father  from dementia and old age    Medication List   Post Discharge Medication Reconciliation Status: discharge medications reconciled and changed, per note/orders (see AVS)   Current Outpatient Medications on File Prior to Visit   Medication Sig Dispense Refill   ? acetaminophen (TYLENOL) 500 MG tablet Take 2 tablets (1,000 mg total) by mouth 3 (three) times a day. 100 tablet 0   ? albuterol (PROVENTIL) 2.5 mg /3 mL (0.083 %)  nebulizer solution Take 3 mL (2.5 mg total) by nebulization 3 (three) times a day as needed for wheezing. 30 vial 0   ? albuterol (PROVENTIL) 2.5 mg /3 mL (0.083 %) nebulizer solution Take 3 mL (2.5 mg total) by nebulization 3 (three) times a day. 30 vial 0   ? apixaban ANTICOAGULANT (ELIQUIS) 5 mg Tab tablet Take 1 tablet (5 mg total) by mouth 2 (two) times a day. 60 tablet 0   ? artificial tear,dxtrn-hpm-gly, (GENTEAL TEARS MODERATE) 0.1-0.3-0.2 % Drop ophthalmic drops Administer 1 drop to the right eye 2 (two) times a day. 1 Bottle 0   ? calcium, as carbonate, (OS-MAGUI) 500 mg calcium (1,250 mg) tablet Take 1 tablet by mouth daily.     ? cholecalciferol, vitamin D3, 5,000 unit Tab Take 1 tablet by mouth daily.     ? citalopram (CELEXA) 20 MG tablet Take 1 tablet (20 mg total) by mouth daily. 30 tablet 0   ? cyanocobalamin 1000 MCG tablet Take 1 tablet (1,000 mcg total) by mouth daily. 30 tablet 0   ? ferrous sulfate 325 (65 FE) MG tablet Take 1 tablet by mouth 2 (two) times a day.      ? furosemide (LASIX) 20 MG tablet Take 1 tablet (20 mg total) by mouth daily. 30 tablet 0   ? melatonin 10 mg Tab Take 10 mg by mouth at bedtime. 30 tablet 0   ? metoprolol succinate (TOPROL-XL) 50 MG 24 hr tablet Take 1 tablet (50 mg total) by mouth daily. (Patient taking differently: Take 12.5 mg by mouth daily. ) 30 tablet 0   ? polyethylene glycol (MIRALAX) 17 gram packet Take 1 packet (17 g total) by mouth daily. 30 packet 0   ? predniSONE (DELTASONE) 2.5 MG tablet Take 2.5 mg by mouth daily. 30 tablet 0   ? senna-docusate (SENNOSIDES-DOCUSATE SODIUM) 8.6-50 mg tablet Take 1 tablet by mouth 2 (two) times a day as needed for constipation. 30 tablet 0   ? traMADol (ULTRAM) 50 mg tablet Take 25 mg by mouth every 6 (six) hours as needed for pain.     ? umeclidinium-vilanterol (ANORO ELLIPTA) 62.5-25 mcg/actuation inhaler Inhale 1 puff daily. 1 Inhaler 0     No current facility-administered medications on file prior to visit.   "      Allergies     No Known Allergies    Review of Systems   A comprehensive review of 14 systems was done. Pertinent findings noted here and in history of present illness. All the rest negative.  Constitutional: Negative.  Negative for fever, chills, she has activity change, appetite change and fatigue.  Losing weight not eating well at all eating less than 50% of them offered meal  HENT: Negative for congestion and facial swelling.    Eyes: Negative for photophobia, redness and visual disturbance.   Respiratory: Negative for cough and chest tightness.    Cardiovascular: Negative for chest pain, palpitations and leg swelling.   Gastrointestinal: Negative for nausea, diarrhea, constipation, blood in stool and abdominal distention.   Genitourinary: Negative.    Musculoskeletal: Negative.  Confused and complaining of low back pain  Skin: Negative.    Neurological: Negative for dizziness, tremors, syncope, weakness, light-headedness and headaches.   Hematological: Does not bruise/bleed easily.   Psychiatric/Behavioral:  impaired memory recall and judgment      Physical Exam     Recent Vitals 2/3/2020   Height 5' 6\"   Weight 170 lbs   BSA (m2) 1.89 m2   /75   Pulse 81   Temp 97   Temp src -   SpO2 96   Some recent data might be hidden       Constitutional: Oriented to person,  and appears well-developed.  Patient is obese  HEENT:  Normocephalic and atraumatic.  Eyes: Conjunctivae and EOM are normal. Pupils are equal, round, and reactive to light. No discharge.  No scleral icterus. Nose normal. Mouth/Throat: Oropharynx is clear and moist. No oropharyngeal exudate.    NECK: Normal range of motion. Neck supple. No JVD present. No tracheal deviation present. No thyromegaly present.   CARDIOVASCULAR: Normal rate, regular rhythm and intact distal pulses.  Exam reveals no gallop and no friction rub.  Systolic murmur present.  PULMONARY: Effort normal and breath sounds normal. No respiratory distress.No Wheezing or " rales.  ABDOMEN: Soft. Bowel sounds are normal. No distension and no mass.  There is no tenderness. There is no rebound and no guarding. No HSM.  MUSCULOSKELETAL: Normal range of motion. No edema and no tenderness. Mild kyphosis, no tenderness.  Has an intact dressing over her left hip  LYMPH NODES: Has no cervical, supraclavicular, axillary and groin adenopathy.   NEUROLOGICAL: Alert and oriented to person, . No cranial nerve deficit.  Normal muscle tone. Coordination normal.   GENITOURINARY: Deferred exam.  SKIN: Skin is warm and dry. No rash noted. No erythema. No pallor.   EXTREMITIES: No cyanosis, no clubbing, no edema. No Deformity.  PSYCHIATRIC: Stable mood, affect and behavior.  Recall impaired judgment impaired memory is impaired      Lab Results     Recent Results (from the past 240 hour(s))   HM2(CBC w/o Differential)    Collection Time: 01/27/20  4:30 AM   Result Value Ref Range    WBC 12.1 (H) 4.0 - 11.0 thou/uL    RBC 2.50 (L) 3.80 - 5.40 mill/uL    Hemoglobin 7.8 (L) 12.0 - 16.0 g/dL    Hematocrit 26.5 (L) 35.0 - 47.0 %     (H) 80 - 100 fL    MCH 31.2 27.0 - 34.0 pg    MCHC 29.4 (L) 32.0 - 36.0 g/dL    RDW 16.2 (H) 11.0 - 14.5 %    Platelets 395 140 - 440 thou/uL    MPV 9.3 8.5 - 12.5 fL   Folate, Serum    Collection Time: 01/27/20  4:30 AM   Result Value Ref Range    Folate 11.6 >=3.5 ng/mL   HM2(CBC w/o Differential)    Collection Time: 02/04/20  7:37 AM   Result Value Ref Range    WBC 11.4 (H) 4.0 - 11.0 thou/uL    RBC 3.48 (L) 3.80 - 5.40 mill/uL    Hemoglobin 11.0 (L) 12.0 - 16.0 g/dL    Hematocrit 36.3 35.0 - 47.0 %     (H) 80 - 100 fL    MCH 31.6 27.0 - 34.0 pg    MCHC 30.3 (L) 32.0 - 36.0 g/dL    RDW 15.6 (H) 11.0 - 14.5 %    Platelets 385 140 - 440 thou/uL    MPV 9.6 8.5 - 12.5 fL        Lab Results   Component Value Date    ATFNHVMF10 1,056 (H) 10/16/2019     Lab Results   Component Value Date    TSH 2.39 10/16/2019           JUAN A Camacho

## 2021-06-20 NOTE — LETTER
Letter by Qing Leon MBBS at      Author: Qing Leon MBBS Service: -- Author Type: --    Filed:  Encounter Date: 1/22/2020 Status: (Other)         Patient: Cha mSith   MR Number: [id number]   YOB: 1933   Date of Visit: 1/22/2020       [facility] Admission note      Patient: Cha Smith  MRN: [id number]  Date of Service: 1/22/2020      [facility] [[id number]]  Reason for Visit     Chief Complaint   Patient presents with   ? Review Of Multiple Medical Conditions       Code Status     DNR    Assessment     -History of a left femoral neck displaced and comminuted fracture status post left hip hemiarthroplasty on 1/13/2020  -Pain management  - ABLA with severe anemia noted on recheck with a hemoglobin of 7.4  -Hypotension noted in the TCU with low blood pressures  -History of acute hypoxic respiratory failure currently on 2 L  -History of aspiration pneumonitis  -DVT prophylaxiS  - Underlying history of chronic CLL with chronic leukocytosis felt to be stable- RC WBC AT 12.6 TODAY  -History of lung and breast cancer currently in remission  -History of COPD  -Chronic atrial fibrillation currently on Eliquis  - Profound dementia with patient alert and oriented only to self-   -Generalized weakness  -Malnutrition with a low albumin  -Hypocalcemia recheck calcium is 8.4    Plan     Patient admitted to the TCU for strengthening and rehab.  She is weightbearing as tolerated left lower extremity postoperatively with posterior hip precautions  Continue with her incisional cares  Unfortunately progress has been extremely slow due to cognitive decline.  She requires maximum cognitive assistance with most of her ADLs  She is currently wheelchair-bound  She is pleasantly confused.  SLUMS 8/30 and patient remains a poor historian due to that alert and oriented only to self  Oral intake remains minimal less than 50%.  Care plan reviewed with dietary and she will be started  on Ensure supplementation  Monitor weights closely  Blood pressure review done and she remains hypotensive suspect this is due to poor oral intake  Toprol has been reduced to 25 mg daily.  Continue with hold parameters due to persistent low blood pressure suspect due to poor oral intake.  Recheck hemoglobin pending for tomorrow monitor she is on iron supplementation  Continue with her PT OT and rehab  Wean her off oxygen as tolerated.    She is at significant risk of malnutrition due to poor oral intake    History     Patient is a very pleasant 86 y.o. female who is admitted to TCU  Patient is admitted with underlying history of dementia.  She is pleasantly confused and alert and oriented only to self. SLUMS 8/30 she remains confused but no significant behaviors reported by staff    Patient admitted with a displaced comminuted left femoral neck fracture and underwent left hip hemiarthroplasty on 1/13/2020  She has tolerated the procedure well and has been discharged to the TCU for strengthening and rehab.  Unfortunately progress is slow and she remains wheelchair-bound    Postoperative course was complicated by acute hypoxic respiratory failure.  Currently she is on 2 L of oxygen by nasal cannula while in the hospital she needed as much as 8 L of oxygen.  It was felt to be secondary to underlying lung disease as well as from acute fracture and possibly with anemia.  She is still dependent on oxygen  She also was noted to have acute aspiration pneumonitis.  Imaging did show right upper lobe atelectasis with left upper lobe atelectasis with chronic pneumonitis.  As per her family this is chronic symptoms and they are treated with antibiotics and steroids.  She did finish a course of Augmentin and has been discharged currently advised to wean off oxygen further as tolerated.  Does not have any current coughing or congestion    She has ongoing history of atrial fibrillation heart rates were stable apparently in the  hospital was elected to continue with her current anticoagulation regimen  Unfortunately recheck labs were reviewed and she continues to be severely anemic hemoglobin actually has dropped further to 7.4.  She also is persistently showing low blood pressures recheck blood pressure in spite of low blood pressures were reviewed and is 101/54 she remains confused and unable to give any history  She is now on a lower regimen of metoprolol with persistently low blood pressures  Oral intake has decreased and staff is reporting that patient is not eating well at all    Past Medical History     Active Ambulatory (Non-Hospital) Problems    Diagnosis   ? Primary insomnia   ? CLL (chronic lymphocytic leukemia) (H)   ? Aspiration pneumonitis (H)   ? TIA (transient ischemic attack)   ? Closed displaced fracture of left femoral neck (H)   ? Closed fracture of neck of left femur, initial encounter (H)   ? Atrial fibrillation with RVR (H)   ? COPD (chronic obstructive pulmonary disease) with acute bronchitis (H)   ? Moderate dementia with behavioral disturbance (H)   ? Acute deep vein thrombosis (DVT) of distal vein of left lower extremity (H)   ? Typical atrial flutter (H)   ? Adenocarcinoma, lung, left (H)   ? Hypertension   ? 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 A-fib (H)    ? Breast cancer (H)    ? CLL (chronic lymphocytic leukemia) (H)    ? COPD (chronic obstructive pulmonary disease) (H)    ? Dementia (H)    ? DVT (deep venous thrombosis) (H)    ? Hypertension    ? Lung cancer (H)        Past Social History     Reviewed, and she  reports that she has quit smoking. She has quit using smokeless tobacco. She reports previous alcohol use. She reports previous drug use.    Family History     Reviewed, and history of prostate cancer in the maternal grandfather.  Ovarian cancer in maternal aunt who  at age 70 his prostate cancer in maternal  uncle in all 8 uncles had prostate cancer  Mother  from respiratory distress at age 79 father  from dementia and old age    Medication List   Post Discharge Medication Reconciliation Status: discharge medications reconciled and changed, per note/orders (see AVS)   Current Outpatient Medications on File Prior to Visit   Medication Sig Dispense Refill   ? acetaminophen (TYLENOL) 500 MG tablet Take 2 tablets (1,000 mg total) by mouth 3 (three) times a day. 100 tablet 0   ? albuterol (PROVENTIL) 2.5 mg /3 mL (0.083 %) nebulizer solution Take 3 mL (2.5 mg total) by nebulization 3 (three) times a day as needed for wheezing. 30 vial 0   ? albuterol (PROVENTIL) 2.5 mg /3 mL (0.083 %) nebulizer solution Take 3 mL (2.5 mg total) by nebulization 3 (three) times a day. 30 vial 0   ? apixaban ANTICOAGULANT (ELIQUIS) 5 mg Tab tablet Take 1 tablet (5 mg total) by mouth 2 (two) times a day. 60 tablet 0   ? artificial tear,dxtrn-hpm-gly, (GENTEAL TEARS MODERATE) 0.1-0.3-0.2 % Drop ophthalmic drops Administer 1 drop to the right eye 2 (two) times a day. 1 Bottle 0   ? cholecalciferol, vitamin D3, 1,000 unit (25 mcg) tablet Take 1 tablet (1,000 Units total) by mouth daily. 30 tablet 0   ? citalopram (CELEXA) 20 MG tablet Take 1 tablet (20 mg total) by mouth daily. 30 tablet 0   ? cyanocobalamin 1000 MCG tablet Take 1 tablet (1,000 mcg total) by mouth daily. 30 tablet 0   ? ferrous sulfate 325 (65 FE) MG tablet Take 1 tablet by mouth daily with breakfast.     ? furosemide (LASIX) 20 MG tablet Take 1 tablet (20 mg total) by mouth daily. 30 tablet 0   ? melatonin 10 mg Tab Take 10 mg by mouth at bedtime. 30 tablet 0   ? metoprolol succinate (TOPROL-XL) 50 MG 24 hr tablet Take 1 tablet (50 mg total) by mouth daily. (Patient taking differently: Take 25 mg by mouth daily. ) 30 tablet 0   ? polyethylene glycol (MIRALAX) 17 gram packet Take 1 packet (17 g total) by mouth daily. 30 packet 0   ? predniSONE (DELTASONE) 2.5 MG tablet Take  2.5 mg by mouth daily. 30 tablet 0   ? senna-docusate (SENNOSIDES-DOCUSATE SODIUM) 8.6-50 mg tablet Take 1 tablet by mouth 2 (two) times a day as needed for constipation. 30 tablet 0   ? traMADol (ULTRAM) 50 mg tablet Take 0.5 tablets (25 mg total) by mouth every 6 (six) hours as needed for severe pain (7-10). 8 tablet 0   ? traZODone (DESYREL) 50 MG tablet Take 1 tablet (50 mg total) by mouth daily as needed (anxiety). 30 tablet 0   ? umeclidinium-vilanterol (ANORO ELLIPTA) 62.5-25 mcg/actuation inhaler Inhale 1 puff daily. 1 Inhaler 0     No current facility-administered medications on file prior to visit.        Allergies     No Known Allergies    Review of Systems   A comprehensive review of 14 systems was done. Pertinent findings noted here and in history of present illness. All the rest negative.  Constitutional: Negative.  Negative for fever, chills, she has activity change, appetite change and fatigue.  Losing weight not eating well at all eating less than 50% of them offered meal  HENT: Negative for congestion and facial swelling.    Eyes: Negative for photophobia, redness and visual disturbance.   Respiratory: Negative for cough and chest tightness.    Cardiovascular: Negative for chest pain, palpitations and leg swelling.   Gastrointestinal: Negative for nausea, diarrhea, constipation, blood in stool and abdominal distention.   Genitourinary: Negative.    Musculoskeletal: Negative.  Confused and complaining of low back pain  Skin: Negative.    Neurological: Negative for dizziness, tremors, syncope, weakness, light-headedness and headaches.   Hematological: Does not bruise/bleed easily.   Psychiatric/Behavioral:  impaired memory recall and judgment      Physical Exam     Recent Vitals 1/17/2020   Height -   Weight -   BSA (m2) -   BP 97/56   Pulse 77   Temp 97   Temp src -   SpO2 95   Some recent data might be hidden     Recheck blood pressure one 93 /57 WT 167LB  Constitutional: Oriented to person,  and  appears well-developed.  Patient is obese  On 2 L of oxygen by nasal cannula  HEENT:  Normocephalic and atraumatic.  Eyes: Conjunctivae and EOM are normal. Pupils are equal, round, and reactive to light. No discharge.  No scleral icterus. Nose normal. Mouth/Throat: Oropharynx is clear and moist. No oropharyngeal exudate.    NECK: Normal range of motion. Neck supple. No JVD present. No tracheal deviation present. No thyromegaly present.   CARDIOVASCULAR: Normal rate, regular rhythm and intact distal pulses.  Exam reveals no gallop and no friction rub.  Systolic murmur present.  PULMONARY: Effort normal and breath sounds normal. No respiratory distress.No Wheezing or rales.  ABDOMEN: Soft. Bowel sounds are normal. No distension and no mass.  There is no tenderness. There is no rebound and no guarding. No HSM.  MUSCULOSKELETAL: Normal range of motion. No edema and no tenderness. Mild kyphosis, no tenderness.  Has an intact dressing over her left hip  LYMPH NODES: Has no cervical, supraclavicular, axillary and groin adenopathy.   NEUROLOGICAL: Alert and oriented to person, . No cranial nerve deficit.  Normal muscle tone. Coordination normal.   GENITOURINARY: Deferred exam.  SKIN: Skin is warm and dry. No rash noted. No erythema. No pallor.   EXTREMITIES: No cyanosis, no clubbing, no edema. No Deformity.  PSYCHIATRIC: Stable mood, affect and behavior.  Recall impaired judgment impaired memory is impaired      Lab Results     Recent Results (from the past 240 hour(s))   INR    Collection Time: 01/12/20  5:06 PM   Result Value Ref Range    INR 1.14 (H) 0.90 - 1.10   APTT(PTT)    Collection Time: 01/12/20  5:06 PM   Result Value Ref Range    PTT 28 24 - 37 seconds   Basic Metabolic Panel    Collection Time: 01/12/20  5:06 PM   Result Value Ref Range    Sodium 141 136 - 145 mmol/L    Potassium 4.4 3.5 - 5.0 mmol/L    Chloride 106 98 - 107 mmol/L    CO2 25 22 - 31 mmol/L    Anion Gap, Calculation 10 5 - 18 mmol/L    Glucose  117 70 - 125 mg/dL    Calcium 9.2 8.5 - 10.5 mg/dL    BUN 22 8 - 28 mg/dL    Creatinine 0.83 0.60 - 1.10 mg/dL    GFR MDRD Af Amer >60 >60 mL/min/1.73m2    GFR MDRD Non Af Amer >60 >60 mL/min/1.73m2   Type and Screen    Collection Time: 01/12/20  5:06 PM   Result Value Ref Range    ABORh O NEG     Antibody Screen Negative Negative   HM1 (CBC with Diff)    Collection Time: 01/12/20  5:06 PM   Result Value Ref Range    WBC 17.8 (H) 4.0 - 11.0 thou/uL    RBC 4.11 3.80 - 5.40 mill/uL    Hemoglobin 12.6 12.0 - 16.0 g/dL    Hematocrit 39.1 35.0 - 47.0 %    MCV 95 80 - 100 fL    MCH 30.7 27.0 - 34.0 pg    MCHC 32.2 32.0 - 36.0 g/dL    RDW 12.6 11.0 - 14.5 %    Platelets 310 140 - 440 thou/uL    MPV 9.3 8.5 - 12.5 fL    Neutrophils % 59 50 - 70 %    Lymphocytes % 34 20 - 40 %    Monocytes % 5 2 - 10 %    Eosinophils % 0 0 - 6 %    Basophils % 0 0 - 2 %    Neutrophils Absolute 10.4 (H) 2.0 - 7.7 thou/uL    Lymphocytes Absolute 6.0 (H) 0.8 - 4.4 thou/uL    Monocytes Absolute 0.9 0.0 - 0.9 thou/uL    Eosinophils Absolute 0.1 0.0 - 0.4 thou/uL    Basophils Absolute 0.1 0.0 - 0.2 thou/uL   Procalcitonin    Collection Time: 01/12/20  5:06 PM   Result Value Ref Range    Procalcitonin 0.02 0.00 - 0.49 ng/mL   Magnesium    Collection Time: 01/12/20  5:06 PM   Result Value Ref Range    Magnesium 2.0 1.8 - 2.6 mg/dL   Lactic Acid    Collection Time: 01/13/20 12:05 AM   Result Value Ref Range    Lactic Acid 1.0 0.5 - 2.2 mmol/L   ECG 12 lead with MUSE    Collection Time: 01/13/20  2:36 AM   Result Value Ref Range    SYSTOLIC BLOOD PRESSURE      DIASTOLIC BLOOD PRESSURE      VENTRICULAR RATE 97 BPM    ATRIAL RATE 97 BPM    P-R INTERVAL 162 ms    QRS DURATION 76 ms    Q-T INTERVAL 398 ms    QTC CALCULATION (BEZET) 505 ms    P Axis 68 degrees    R AXIS -58 degrees    T AXIS 66 degrees    MUSE DIAGNOSIS       Normal sinus rhythm  Left axis deviation  Prolonged QT  Abnormal ECG  No previous ECGs available  Confirmed by LINA RAO MD  LOC:WW ([id number]) on 1/13/2020 1:44:20 PM     Hemoglobin day of surgery    Collection Time: 01/14/20  6:18 AM   Result Value Ref Range    Hemoglobin 9.4 (L) 12.0 - 16.0 g/dL   Hemoglobin    Collection Time: 01/15/20  6:18 AM   Result Value Ref Range    Hemoglobin 8.7 (L) 12.0 - 16.0 g/dL   HM2(CBC w/o Differential)    Collection Time: 01/16/20  5:32 AM   Result Value Ref Range    WBC 13.3 (H) 4.0 - 11.0 thou/uL    RBC 2.68 (L) 3.80 - 5.40 mill/uL    Hemoglobin 8.2 (L) 12.0 - 16.0 g/dL    Hematocrit 26.0 (L) 35.0 - 47.0 %    MCV 97 80 - 100 fL    MCH 30.6 27.0 - 34.0 pg    MCHC 31.5 (L) 32.0 - 36.0 g/dL    RDW 12.9 11.0 - 14.5 %    Platelets 216 140 - 440 thou/uL    MPV 10.1 8.5 - 12.5 fL   Basic Metabolic Panel    Collection Time: 01/16/20  5:32 AM   Result Value Ref Range    Sodium 140 136 - 145 mmol/L    Potassium 4.0 3.5 - 5.0 mmol/L    Chloride 106 98 - 107 mmol/L    CO2 29 22 - 31 mmol/L    Anion Gap, Calculation 5 5 - 18 mmol/L    Glucose 113 70 - 125 mg/dL    Calcium 8.2 (L) 8.5 - 10.5 mg/dL    BUN 27 8 - 28 mg/dL    Creatinine 0.73 0.60 - 1.10 mg/dL    GFR MDRD Af Amer >60 >60 mL/min/1.73m2    GFR MDRD Non Af Amer >60 >60 mL/min/1.73m2   Basic Metabolic Panel    Collection Time: 01/20/20  8:22 AM   Result Value Ref Range    Sodium 143 136 - 145 mmol/L    Potassium 3.8 3.5 - 5.0 mmol/L    Chloride 105 98 - 107 mmol/L    CO2 32 (H) 22 - 31 mmol/L    Anion Gap, Calculation 6 5 - 18 mmol/L    Glucose 83 70 - 125 mg/dL    Calcium 8.4 (L) 8.5 - 10.5 mg/dL    BUN 17 8 - 28 mg/dL    Creatinine 0.65 0.60 - 1.10 mg/dL    GFR MDRD Af Amer >60 >60 mL/min/1.73m2    GFR MDRD Non Af Amer >60 >60 mL/min/1.73m2   Vitamin D, Total (25-Hydroxy)    Collection Time: 01/20/20  8:22 AM   Result Value Ref Range    Vitamin D, Total (25-Hydroxy) 28.7 (L) 30.0 - 80.0 ng/mL   HM2(CBC w/o Differential)    Collection Time: 01/20/20  8:22 AM   Result Value Ref Range    WBC 12.6 (H) 4.0 - 11.0 thou/uL    RBC 2.38 (L) 3.80 - 5.40 mill/uL     Hemoglobin 7.4 (L) 12.0 - 16.0 g/dL    Hematocrit 24.0 (L) 35.0 - 47.0 %     (H) 80 - 100 fL    MCH 31.1 27.0 - 34.0 pg    MCHC 30.8 (L) 32.0 - 36.0 g/dL    RDW 13.3 11.0 - 14.5 %    Platelets 337 140 - 440 thou/uL    MPV 9.7 8.5 - 12.5 fL                JUAN A Camacho

## 2021-06-20 NOTE — LETTER
Letter by Qing Leon MBBS at      Author: iQng Leon MBBS Service: -- Author Type: --    Filed:  Encounter Date: 1/20/2020 Status: Signed         Patient: Cha Smith   MR Number: [id number]   YOB: 1933   Date of Visit: 1/20/2020       [facility] Admission note      Patient: Cha Smith  MRN: [id number]  Date of Service: 1/20/2020      [facility] [[id number]]  Reason for Visit     Chief Complaint   Patient presents with   ? H & P       Code Status     DNR    Assessment     -History of a left femoral neck displaced and comminuted fracture status post left hip hemiarthroplasty on 1/13/2020  -Pain management  - ABLA with severe anemia noted on recheck with a hemoglobin of 7.4  -Hypotension noted in the TCU with low blood pressures  -History of acute hypoxic respiratory failure currently on 2 L  -History of aspiration pneumonitis  -DVT prophylaxiS  - Underlying history of chronic CLL with chronic leukocytosis felt to be stable- RC WBC AT 12.6 TODAY  -History of lung and breast cancer currently in remission  -History of COPD  -Chronic atrial fibrillation currently on Eliquis  - Profound dementia with patient alert and oriented only to self-   -Generalized weakness  -Malnutrition with a low albumin  -Hypocalcemia recheck calcium is 8.4    Plan     Patient admitted to the TCU for strengthening and rehab.  She is weightbearing as tolerated left lower extremity postoperatively with posterior hip precautions  Continue with her incisional cares  Unfortunately it is hard to assess because of profound cognitive impairment.  She is on scheduled Tylenol for pain but believes is for back pain she does not recall that she has a hip fracture.  She remains a high fall risk because of this reason.  She will probably not be compliant with her restrictions  Since postoperative course complicated by severe anemia hemoglobin dropped down to 8.2 on the day of discharge.  They have  recommended close follow-up on her hemoglobin levels.  hemoglobin however is quite low at 7.4.  Recheck BMP is normal.  Due to anemia concern she will be started on iron sulfate 325 mg daily.  Plan on continue with iron supplementation and recheck hemoglobin closely in 3 days consider transfusion if hemoglobin drops below 7  Staff also requested to guaiac stools and update if positive  Recheck hemoglobin again in 3 days.  She had leukocytosis with an admission white count of 17.8.  Discharge white count was 13.3.  This is felt to be secondary to underlying history of chronic CLL with leukocytosis.  She also has a history of lung and breast cancer.  R/C Check labs done today in the TCU were reviewed   white count is improved to 12.6   All these conditions are felt to be stable with no aggressive work-up required.  She developed hypoxic respiratory failure requiring as much as 8 L of oxygen currently discharged on 2L .  She will be weaned further down as tolerated.  She has underlying history of aspiration pneumonitis and generally gets antibiotics and steroids whenever she has a flare which was what was done in the hospital.  Currently she denies any coughing.  Unfortunately she is a poor historian because of profound dementia and is alert and oriented only to self.  SLUMS 8/30 IN TCU  Monitor mood and behaviors closely  Metoprolol reduced to 25 mg daily due to low blood pressures.  Close monitoring of blood pressures recommended.  Continue to hold medications if blood pressures continue to run on the low side  Unfortunately heart rates are somewhat on the high side making discontinuation difficult.  Monitor heart rates closely also  Speech eval requested because of history of aspiration pneumonia  Patient at risk for nutritional compromise because of poor oral intake in the setting of cognitive impairment.  Closely monitor mood and behaviors.    History     Patient is a very pleasant 86 y.o. female who is admitted  to TCU  Patient is admitted with underlying history of dementia.  She is pleasantly confused and alert and oriented only to self. [facility] 8/30    Patient admitted with a displaced comminuted left femoral neck fracture and underwent left hip hemiarthroplasty on 1/13/2020  She has tolerated the procedure well and has been discharged to the TCU for strengthening and rehab    Postoperative course was complicated by acute hypoxic respiratory failure.  Currently she is on 2 L of oxygen by nasal cannula while in the hospital she needed as much as 8 L of oxygen.  It was felt to be secondary to underlying lung disease as well as from acute fracture and possibly with anemia.  She also was noted to have acute aspiration pneumonitis.  Imaging did show right upper lobe atelectasis with left upper lobe atelectasis with chronic pneumonitis.  As per her family this is chronic symptoms and they are treated with antibiotics and steroids.  She did finish a course of Augmentin and has been discharged currently advised to wean off oxygen further as tolerated.    She has ongoing history of atrial fibrillation heart rates were stable apparently in the hospital was elected to continue with her current anticoagulation regimen  Unfortunately recheck labs were reviewed and she continues to be severely anemic hemoglobin actually has dropped further to 7.4.  She also is persistently showing low blood pressures recheck blood pressure in spite of low blood pressures were reviewed and is 101/54 she remains confused and unable to give any history    Past Medical History     Active Ambulatory (Non-Hospital) Problems    Diagnosis   ? Primary insomnia   ? CLL (chronic lymphocytic leukemia) (H)   ? Aspiration pneumonitis (H)   ? TIA (transient ischemic attack)   ? Closed displaced fracture of left femoral neck (H)   ? Closed fracture of neck of left femur, initial encounter (H)   ? Atrial fibrillation with RVR (H)   ? COPD (chronic obstructive pulmonary  disease) with acute bronchitis (H)   ? Moderate dementia with behavioral disturbance (H)   ? Acute deep vein thrombosis (DVT) of distal vein of left lower extremity (H)   ? Typical atrial flutter (H)   ? Adenocarcinoma, lung, left (H)   ? Hypertension   ? 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 A-fib (H)    ? Breast cancer (H)    ? CLL (chronic lymphocytic leukemia) (H)    ? COPD (chronic obstructive pulmonary disease) (H)    ? Dementia (H)    ? DVT (deep venous thrombosis) (H)    ? Hypertension    ? Lung cancer (H)        Past Social History     Reviewed, and she  reports that she has quit smoking. She has quit using smokeless tobacco. She reports previous alcohol use. She reports previous drug use.    Family History     Reviewed, and history of prostate cancer in the maternal grandfather.  Ovarian cancer in maternal aunt who  at age 70 his prostate cancer in maternal uncle in all 8 uncles had prostate cancer  Mother  from respiratory distress at age 79 father  from dementia and old age    Medication List   Post Discharge Medication Reconciliation Status: discharge medications reconciled and changed, per note/orders (see AVS)   Current Outpatient Medications on File Prior to Visit   Medication Sig Dispense Refill   ? acetaminophen (TYLENOL) 500 MG tablet Take 2 tablets (1,000 mg total) by mouth 3 (three) times a day. 100 tablet 0   ? albuterol (PROVENTIL) 2.5 mg /3 mL (0.083 %) nebulizer solution Take 3 mL (2.5 mg total) by nebulization 3 (three) times a day as needed for wheezing. 30 vial 0   ? albuterol (PROVENTIL) 2.5 mg /3 mL (0.083 %) nebulizer solution Take 3 mL (2.5 mg total) by nebulization 3 (three) times a day. 30 vial 0   ? apixaban ANTICOAGULANT (ELIQUIS) 5 mg Tab tablet Take 1 tablet (5 mg total) by mouth 2 (two) times a day. 60 tablet 0   ? artificial tear,dxtrn-hpm-gly, (GENTEAL TEARS MODERATE)  0.1-0.3-0.2 % Drop ophthalmic drops Administer 1 drop to the right eye 2 (two) times a day. 1 Bottle 0   ? cholecalciferol, vitamin D3, 1,000 unit (25 mcg) tablet Take 1 tablet (1,000 Units total) by mouth daily. 30 tablet 0   ? citalopram (CELEXA) 20 MG tablet Take 1 tablet (20 mg total) by mouth daily. 30 tablet 0   ? cyanocobalamin 1000 MCG tablet Take 1 tablet (1,000 mcg total) by mouth daily. 30 tablet 0   ? ferrous sulfate 325 (65 FE) MG tablet Take 1 tablet by mouth daily with breakfast.     ? furosemide (LASIX) 20 MG tablet Take 1 tablet (20 mg total) by mouth daily. 30 tablet 0   ? melatonin 10 mg Tab Take 10 mg by mouth at bedtime. 30 tablet 0   ? metoprolol succinate (TOPROL-XL) 50 MG 24 hr tablet Take 1 tablet (50 mg total) by mouth daily. (Patient taking differently: Take 25 mg by mouth daily. ) 30 tablet 0   ? polyethylene glycol (MIRALAX) 17 gram packet Take 1 packet (17 g total) by mouth daily. 30 packet 0   ? predniSONE (DELTASONE) 2.5 MG tablet Take 2.5 mg by mouth daily. 30 tablet 0   ? senna-docusate (SENNOSIDES-DOCUSATE SODIUM) 8.6-50 mg tablet Take 1 tablet by mouth 2 (two) times a day as needed for constipation. 30 tablet 0   ? traMADol (ULTRAM) 50 mg tablet Take 0.5 tablets (25 mg total) by mouth every 6 (six) hours as needed for severe pain (7-10). 8 tablet 0   ? traZODone (DESYREL) 50 MG tablet Take 1 tablet (50 mg total) by mouth daily as needed (anxiety). 30 tablet 0   ? umeclidinium-vilanterol (ANORO ELLIPTA) 62.5-25 mcg/actuation inhaler Inhale 1 puff daily. 1 Inhaler 0     No current facility-administered medications on file prior to visit.        Allergies     No Known Allergies    Review of Systems   A comprehensive review of 14 systems was done. Pertinent findings noted here and in history of present illness. All the rest negative.  Constitutional: Negative.  Negative for fever, chills, she has activity change, appetite change and fatigue.   HENT: Negative for congestion and facial  swelling.    Eyes: Negative for photophobia, redness and visual disturbance.   Respiratory: Negative for cough and chest tightness.    Cardiovascular: Negative for chest pain, palpitations and leg swelling.   Gastrointestinal: Negative for nausea, diarrhea, constipation, blood in stool and abdominal distention.   Genitourinary: Negative.    Musculoskeletal: Negative.  Confused and complaining of low back pain  Skin: Negative.    Neurological: Negative for dizziness, tremors, syncope, weakness, light-headedness and headaches.   Hematological: Does not bruise/bleed easily.   Psychiatric/Behavioral:  impaired memory recall and judgment      Physical Exam     Recent Vitals 1/17/2020   Height -   Weight -   BSA (m2) -   BP 97/56   Pulse 77   Temp 97   Temp src -   SpO2 95   Some recent data might be hidden     Recheck blood pressure one 101 /57  Constitutional: Oriented to person,  and appears well-developed.  Patient is obese  On 2 L of oxygen by nasal cannula  HEENT:  Normocephalic and atraumatic.  Eyes: Conjunctivae and EOM are normal. Pupils are equal, round, and reactive to light. No discharge.  No scleral icterus. Nose normal. Mouth/Throat: Oropharynx is clear and moist. No oropharyngeal exudate.    NECK: Normal range of motion. Neck supple. No JVD present. No tracheal deviation present. No thyromegaly present.   CARDIOVASCULAR: Normal rate, regular rhythm and intact distal pulses.  Exam reveals no gallop and no friction rub.  Systolic murmur present.  PULMONARY: Effort normal and breath sounds normal. No respiratory distress.No Wheezing or rales.  ABDOMEN: Soft. Bowel sounds are normal. No distension and no mass.  There is no tenderness. There is no rebound and no guarding. No HSM.  MUSCULOSKELETAL: Normal range of motion. No edema and no tenderness. Mild kyphosis, no tenderness.  Has an intact dressing over her left hip  LYMPH NODES: Has no cervical, supraclavicular, axillary and groin adenopathy.   NEUROLOGICAL:  Alert and oriented to person, . No cranial nerve deficit.  Normal muscle tone. Coordination normal.   GENITOURINARY: Deferred exam.  SKIN: Skin is warm and dry. No rash noted. No erythema. No pallor.   EXTREMITIES: No cyanosis, no clubbing, no edema. No Deformity.  PSYCHIATRIC: Normal mood, affect and behavior.  Recall impaired judgment impaired memory is impaired      Lab Results     Recent Results (from the past 240 hour(s))   INR    Collection Time: 01/12/20  5:06 PM   Result Value Ref Range    INR 1.14 (H) 0.90 - 1.10   APTT(PTT)    Collection Time: 01/12/20  5:06 PM   Result Value Ref Range    PTT 28 24 - 37 seconds   Basic Metabolic Panel    Collection Time: 01/12/20  5:06 PM   Result Value Ref Range    Sodium 141 136 - 145 mmol/L    Potassium 4.4 3.5 - 5.0 mmol/L    Chloride 106 98 - 107 mmol/L    CO2 25 22 - 31 mmol/L    Anion Gap, Calculation 10 5 - 18 mmol/L    Glucose 117 70 - 125 mg/dL    Calcium 9.2 8.5 - 10.5 mg/dL    BUN 22 8 - 28 mg/dL    Creatinine 0.83 0.60 - 1.10 mg/dL    GFR MDRD Af Amer >60 >60 mL/min/1.73m2    GFR MDRD Non Af Amer >60 >60 mL/min/1.73m2   Type and Screen    Collection Time: 01/12/20  5:06 PM   Result Value Ref Range    ABORh O NEG     Antibody Screen Negative Negative   HM1 (CBC with Diff)    Collection Time: 01/12/20  5:06 PM   Result Value Ref Range    WBC 17.8 (H) 4.0 - 11.0 thou/uL    RBC 4.11 3.80 - 5.40 mill/uL    Hemoglobin 12.6 12.0 - 16.0 g/dL    Hematocrit 39.1 35.0 - 47.0 %    MCV 95 80 - 100 fL    MCH 30.7 27.0 - 34.0 pg    MCHC 32.2 32.0 - 36.0 g/dL    RDW 12.6 11.0 - 14.5 %    Platelets 310 140 - 440 thou/uL    MPV 9.3 8.5 - 12.5 fL    Neutrophils % 59 50 - 70 %    Lymphocytes % 34 20 - 40 %    Monocytes % 5 2 - 10 %    Eosinophils % 0 0 - 6 %    Basophils % 0 0 - 2 %    Neutrophils Absolute 10.4 (H) 2.0 - 7.7 thou/uL    Lymphocytes Absolute 6.0 (H) 0.8 - 4.4 thou/uL    Monocytes Absolute 0.9 0.0 - 0.9 thou/uL    Eosinophils Absolute 0.1 0.0 - 0.4 thou/uL     Basophils Absolute 0.1 0.0 - 0.2 thou/uL   Procalcitonin    Collection Time: 01/12/20  5:06 PM   Result Value Ref Range    Procalcitonin 0.02 0.00 - 0.49 ng/mL   Magnesium    Collection Time: 01/12/20  5:06 PM   Result Value Ref Range    Magnesium 2.0 1.8 - 2.6 mg/dL   Lactic Acid    Collection Time: 01/13/20 12:05 AM   Result Value Ref Range    Lactic Acid 1.0 0.5 - 2.2 mmol/L   ECG 12 lead with MUSE    Collection Time: 01/13/20  2:36 AM   Result Value Ref Range    SYSTOLIC BLOOD PRESSURE      DIASTOLIC BLOOD PRESSURE      VENTRICULAR RATE 97 BPM    ATRIAL RATE 97 BPM    P-R INTERVAL 162 ms    QRS DURATION 76 ms    Q-T INTERVAL 398 ms    QTC CALCULATION (BEZET) 505 ms    P Axis 68 degrees    R AXIS -58 degrees    T AXIS 66 degrees    MUSE DIAGNOSIS       Normal sinus rhythm  Left axis deviation  Prolonged QT  Abnormal ECG  No previous ECGs available  Confirmed by LINA RAO MD LOC:[facility] ([id number]) on 1/13/2020 1:44:20 PM     Hemoglobin day of surgery    Collection Time: 01/14/20  6:18 AM   Result Value Ref Range    Hemoglobin 9.4 (L) 12.0 - 16.0 g/dL   Hemoglobin    Collection Time: 01/15/20  6:18 AM   Result Value Ref Range    Hemoglobin 8.7 (L) 12.0 - 16.0 g/dL   HM2(CBC w/o Differential)    Collection Time: 01/16/20  5:32 AM   Result Value Ref Range    WBC 13.3 (H) 4.0 - 11.0 thou/uL    RBC 2.68 (L) 3.80 - 5.40 mill/uL    Hemoglobin 8.2 (L) 12.0 - 16.0 g/dL    Hematocrit 26.0 (L) 35.0 - 47.0 %    MCV 97 80 - 100 fL    MCH 30.6 27.0 - 34.0 pg    MCHC 31.5 (L) 32.0 - 36.0 g/dL    RDW 12.9 11.0 - 14.5 %    Platelets 216 140 - 440 thou/uL    MPV 10.1 8.5 - 12.5 fL   Basic Metabolic Panel    Collection Time: 01/16/20  5:32 AM   Result Value Ref Range    Sodium 140 136 - 145 mmol/L    Potassium 4.0 3.5 - 5.0 mmol/L    Chloride 106 98 - 107 mmol/L    CO2 29 22 - 31 mmol/L    Anion Gap, Calculation 5 5 - 18 mmol/L    Glucose 113 70 - 125 mg/dL    Calcium 8.2 (L) 8.5 - 10.5 mg/dL    BUN 27 8 - 28 mg/dL    Creatinine 0.73  0.60 - 1.10 mg/dL    GFR MDRD Af Amer >60 >60 mL/min/1.73m2    GFR MDRD Non Af Amer >60 >60 mL/min/1.73m2            Imaging Results     Xr Chest 2 Views    Result Date: 1/1/2020  EXAM: XR CHEST 2 VIEWS LOCATION: [facility] [place] DATE/TIME: 1/1/2020 2:21 AM INDICATION: weakness COMPARISON: 09/15/2019.     Shallow inspiration. Stable heart size. Aorta is atherosclerotic. Left-sided Port-A-Cath with tip SVC level. Bilateral areas of atelectasis or infiltrate greatest right upper lobe. Small left pleural effusion. Lung findings accentuated due to  level of inspiration. Demineralization. Lumbar compression deformity again noted. Bilateral axillary surgical clips. Limited study.     Ct Head Without Contrast    Result Date: 1/12/2020  [facility] CT HEAD WO CONTRAST, CT CERVICAL SPINE WO CONTRAST 1/12/2020 5:48 PM INDICATION: Head trauma, minor (Age > 65y), fall. TECHNIQUE: HEAD CT: Routine. Dose reduction techniques were used. CERVICAL SPINE CT: Axial images were obtained through the cervical spine and were evaluated in the axial plane with sagittal and coronal reformations. Dose reduction techniques were used. CONTRAST: None. COMPARISON:  01/01/2020 head CT. 05/30/2019 cervical spine CT. FINDINGS: HEAD CT: No skull fracture. No scalp hematoma. No intracranial hemorrhage, extraaxial collection, mass effect or CT evidence of acute infarct.  Severe presumed chronic small vessel ischemic changes. Mild generalized volume loss. The ventricles are proportional to the sulci. Calcification of the distal internal carotid arteries bilaterally. Mild hyperostosis frontalis interna is an incidental age-related finding. Anterior left ethmoid air cells are opacified. Mastoid air cells are clear. Postoperative changes to the bilateral lenses. CERVICAL SPINE CT: No acute fracture. No posttraumatic subluxation. Straightening of the usual cervical lordosis with minimal anterolisthesis of C6 on C7. Mild chronic vertebral body  height loss along the anterior superior T1 and T2 endplates. Diffuse osteopenia. Severe atlantodental degenerative change. Moderate to severe C5-C6 and C6-C7 degenerative disc disease. Mild C4-C5 and C7-T1 degenerative disc disease. Multilevel severe left-sided facet arthropathy with multilevel moderate right-sided facet arthropathy. Fusion at the left C4-C5 facets. 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 Small posterior disc osteophyte complexes at C5-C6 and C6-C7. Mild to moderate C5-C6 to C6-C7 spinal canal stenosis. Moderate to severe right and moderate left C5-C6 neural foraminal stenosis. Mild to moderate left C6-C7 neural foraminal stenosis. Incompletely visualized scarring at the medial right lung apex. Central venous access catheter partially visualized. Remainder negative.     CONCLUSION: HEAD CT: 1.  Stable age-related changes with no acute intracranial abnormality. CERVICAL SPINE CT: 1.  No fracture or post-traumatic subluxation. 2.  Osteopenia with multilevel degenerative change as above. Mild to moderate C5-C6 to C6-C7 spinal canal stenosis. Moderate to severe right and moderate left C5-C6 neural foraminal stenosis.     Ct Head Without Contrast    Result Date: 1/1/2020  EXAM: CT HEAD WO CONTRAST LOCATION: [facility] DATE/TIME: 1/1/2020 2:12 AM INDICATION: Head injury, altered mental status COMPARISON: 10/18/2019 TECHNIQUE: Routine without IV contrast. Multiplanar reformats. Dose reduction techniques were used. FINDINGS: INTRACRANIAL CONTENTS: No intracranial hemorrhage, extraaxial collection, or mass effect.  No CT evidence of acute infarct. Severe presumed chronic small vessel ischemic changes. Moderate generalized volume loss. No hydrocephalus. VISUALIZED ORBITS/SINUSES/MASTOIDS: No intraorbital abnormality. Opacification anterior aspect left ethmoid air cells. No middle ear or mastoid effusion. BONES/SOFT TISSUES: No acute abnormality.     1.  No acute  intracranial process.    Ct Cervical Spine Without Contrast    Result Date: 1/12/2020  [facility] CT HEAD WO CONTRAST, CT CERVICAL SPINE WO CONTRAST 1/12/2020 5:48 PM INDICATION: Head trauma, minor (Age > 65y), fall. TECHNIQUE: HEAD CT: Routine. Dose reduction techniques were used. CERVICAL SPINE CT: Axial images were obtained through the cervical spine and were evaluated in the axial plane with sagittal and coronal reformations. Dose reduction techniques were used. CONTRAST: None. COMPARISON:  01/01/2020 head CT. 05/30/2019 cervical spine CT. FINDINGS: HEAD CT: No skull fracture. No scalp hematoma. No intracranial hemorrhage, extraaxial collection, mass effect or CT evidence of acute infarct.  Severe presumed chronic small vessel ischemic changes. Mild generalized volume loss. The ventricles are proportional to the sulci. Calcification of the distal internal carotid arteries bilaterally. Mild hyperostosis frontalis interna is an incidental age-related finding. Anterior left ethmoid air cells are opacified. Mastoid air cells are clear. Postoperative changes to the bilateral lenses. CERVICAL SPINE CT: No acute fracture. No posttraumatic subluxation. Straightening of the usual cervical lordosis with minimal anterolisthesis of C6 on C7. Mild chronic vertebral body height loss along the anterior superior T1 and T2 endplates. Diffuse osteopenia. Severe atlantodental degenerative change. Moderate to severe C5-C6 and C6-C7 degenerative disc disease. Mild C4-C5 and C7-T1 degenerative disc disease. Multilevel severe left-sided facet arthropathy with multilevel moderate right-sided facet arthropathy. Fusion at the left C4-C5 facets. 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 Small posterior disc osteophyte complexes at C5-C6 and C6-C7. Mild to moderate C5-C6 to C6-C7 spinal canal stenosis. Moderate to severe right and moderate left C5-C6 neural foraminal stenosis. Mild to moderate left C6-C7  neural foraminal stenosis. Incompletely visualized scarring at the medial right lung apex. Central venous access catheter partially visualized. Remainder negative.     CONCLUSION: HEAD CT: 1.  Stable age-related changes with no acute intracranial abnormality. CERVICAL SPINE CT: 1.  No fracture or post-traumatic subluxation. 2.  Osteopenia with multilevel degenerative change as above. Mild to moderate C5-C6 to C6-C7 spinal canal stenosis. Moderate to severe right and moderate left C5-C6 neural foraminal stenosis.     Xr Chest 1 View    Result Date: 1/12/2020  EXAM: XR CHEST 1 VIEW LOCATION: [facility] DATE/TIME: 1/12/2020 5:39 PM INDICATION: fall COMPARISON: 01/01/2020, 09/15/2019     Bilateral chest wall surgical clips. Left subclavian portacatheter tip projects over the mid SVC. Stable enlarged cardiac silhouette. Improved right upper lobe opacity suggests improving atelectasis. Slightly worsened left upper lobe opacity which could represent atelectasis, lung contusion, or pneumonia. No fracture seen.    Xr Hip Left 2 Or More Vws Portable    Result Date: 1/13/2020  EXAM: XR HIP LEFT 2 OR MORE VWS PORTABLE LOCATION: [facility] DATE/TIME: 1/13/2020 11:01 AM INDICATION: s/p left hip stepan COMPARISON: 1/12/2020.     There is a new left hip hemiarthroplasty. Components appear well seated and no complications are identified.     Xr Pelvis W 2 Vw Hip Left    Result Date: 1/12/2020  EXAM: XR PELVIS W 2 VW HIP LEFT LOCATION: [facility] DATE/TIME: 1/12/2020 5:40 PM INDICATION: fall, deformity COMPARISON: None.     Impacted left femoral neck fracture with varus angulation.            JUAN A Camacho

## 2021-06-20 NOTE — LETTER
Letter by Sekou Singletary NP at      Author: Sekou Singletary NP Service: -- Author Type: --    Filed:  Encounter Date: 1/27/2020 Status: (Other)         Patient: Cha Smith   MR Number: [id number]   YOB: 1933   Date of Visit: 1/27/2020     [facility]      Facility:    [facility] [[id number]]  Code Status: DNR      Chief Complaint/Reason for Visit:  101  Chief Complaint   Patient presents with   ? Review Of Multiple Medical Conditions       HPI:   Cha is a 86 y.o. female who is a recent transfer from [facility] with an admission on 1/12/20 and discharge on 1/16/20.  She has a past medical history of dementia, A. 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  She underwent repair, given weightbearing as tolerated, Tylenol and tramadol for pain control.  Also developed ABLA with an initial hemoglobin at 12.6, at discharge 8.2.  Scheduled to follow-up with Ortho in 2 to 3 weeks.  Her CLL has chronic leukocytosis though may be contributing with steroid use.  She also had acute respiratory failure with hypoxia this history of aspiration pneumonitis, needed supplemental oxygen, has weaned down to 2 L.  Admission chest x-ray showed right upper lobe atelectasis, left upper lobe atelectasis likely due to chronic pneumonitis.  Per family was intermittently treated with antibiotics and steroid burst.  Recently completed course of Augmentin, her procalcitonin was negative no infectious symptoms noted.  We will continue to wean oxygen.  Per A. fib, family was consulted on ongoing anticoagulation though has deemed to continue, she is in normal sinus rhythm currently and she has  normal EF.  She was then discharged to TCU for rehab.    Today will assess labs, BP, weaning off O2 and therapy progress.    Past Medical History:  Past Medical History:   Diagnosis Date   ? A-fib (H)    ? Breast cancer (H)    ? CLL (chronic lymphocytic leukemia) (H)    ? COPD (chronic obstructive pulmonary disease) (H)    ? Dementia (H)    ? DVT (deep venous thrombosis) (H)    ? Hypertension    ? Lung cancer (H)            Surgical History:  Past Surgical History:   Procedure Laterality Date   ? BREAST BIOPSY     ? HERNIA REPAIR     ? HYSTERECTOMY     ? MASTECTOMY     ? OR PARTIAL HIP REPLACEMENT Left 1/13/2020    Procedure: HEMIARTHROPLASTY, HIP, UNIPOLAR;  Surgeon: Dav Steward MD;  Location: [facility];  Service: Orthopedics       Family History:   No family history on file.    Social History:    Social History     Socioeconomic History   ? Marital status:      Spouse name: Not on file   ? Number of children: Not on file   ? Years of education: Not on file   ? Highest education level: Not on file   Occupational History   ? Not on file   Social Needs   ? Financial resource strain: Not on file   ? Food insecurity:     Worry: Not on file     Inability: Not on file   ? Transportation needs:     Medical: Not on file     Non-medical: Not on file   Tobacco Use   ? Smoking status: Former Smoker   ? Smokeless tobacco: Former User   Substance and Sexual Activity   ? Alcohol use: Not Currently     Frequency: Never     Binge frequency: Never   ? Drug use: Not Currently   ? Sexual activity: Not Currently   Lifestyle   ? Physical activity:     Days per week: Not on file     Minutes per session: Not on file   ? Stress: Not on file   Relationships   ? Social connections:     Talks on phone: Not on file     Gets together: Not on file     Attends Druze service: Not on file     Active member of club or organization: Not on file     Attends meetings of clubs or organizations: Not on file     Relationship  status: Not on file   ? Intimate partner violence:     Fear of current or ex partner: Not on file     Emotionally abused: Not on file     Physically abused: Not on file     Forced sexual activity: Not on file   Other Topics Concern   ? Not on file   Social History Narrative   ? Not on file          Review of Systems   Constitutional: Positive for activity change and fatigue. Negative for appetite change, chills and diaphoresis.   HENT: Positive for hearing loss. Negative for congestion.    Eyes: Negative.    Respiratory: Negative for shortness of breath and wheezing.    Cardiovascular: Positive for leg swelling.   Gastrointestinal: Negative for abdominal distention, abdominal pain, blood in stool, constipation and diarrhea.   Endocrine: Negative.    Genitourinary: Negative for difficulty urinating.   Musculoskeletal:        Denies pain   Skin: Positive for wound.        L hip incision   Allergic/Immunologic: Negative.    Neurological: Negative for dizziness, tremors, speech difficulty and light-headedness.   Hematological: Negative.    Psychiatric/Behavioral: Positive for confusion. Negative for agitation and hallucinations. The patient is not nervous/anxious.        Vitals:    01/27/20 0820   BP: 115/60   Pulse: 70   Resp: 18   Temp: 98  F (36.7  C)   SpO2: 97%   Weight: 166 lb (75.3 kg)       Physical Exam  Constitutional:       Comments: No acute issues   HENT:      Head: Normocephalic and atraumatic.      Right Ear: External ear normal.      Left Ear: External ear normal.      Mouth/Throat:      Pharynx: No oropharyngeal exudate or posterior oropharyngeal erythema.   Eyes:      General:         Right eye: No discharge.         Left eye: No discharge.   Neck:      Musculoskeletal: Normal range of motion and neck supple.   Cardiovascular:      Rate and Rhythm: Normal rate.      Heart sounds: No murmur. No friction rub. No gallop.       Comments: Hx of A-fib  Pulmonary:      Effort: No respiratory distress.       Breath sounds: No wheezing or rales.      Comments: Dim, 2L NC, MORE  Abdominal:      General: There is no distension.      Palpations: Abdomen is soft.      Tenderness: There is no abdominal tenderness. There is no guarding.      Comments: Denies constipation or diarrhea   Genitourinary:     Comments: incontinent  Musculoskeletal:      Right lower leg: Edema present.      Left lower leg: Edema present.      Comments: 1+BLE, wc bound, denies pain   Skin:     General: Skin is warm and dry.      Comments: L hip incision intact   Neurological:      Mental Status: She is alert. Mental status is at baseline. She is disoriented.      Comments: A/O x1   Psychiatric:         Mood and Affect: Mood normal.         Medication List:  Current Outpatient Medications   Medication Sig   ? acetaminophen (TYLENOL) 500 MG tablet Take 2 tablets (1,000 mg total) by mouth 3 (three) times a day.   ? albuterol (PROVENTIL) 2.5 mg /3 mL (0.083 %) nebulizer solution Take 3 mL (2.5 mg total) by nebulization 3 (three) times a day as needed for wheezing.   ? albuterol (PROVENTIL) 2.5 mg /3 mL (0.083 %) nebulizer solution Take 3 mL (2.5 mg total) by nebulization 3 (three) times a day.   ? apixaban ANTICOAGULANT (ELIQUIS) 5 mg Tab tablet Take 1 tablet (5 mg total) by mouth 2 (two) times a day.   ? artificial tear,dxtrn-hpm-gly, (GENTEAL TEARS MODERATE) 0.1-0.3-0.2 % Drop ophthalmic drops Administer 1 drop to the right eye 2 (two) times a day.   ? cholecalciferol, vitamin D3, 1,000 unit (25 mcg) tablet Take 1 tablet (1,000 Units total) by mouth daily.   ? citalopram (CELEXA) 20 MG tablet Take 1 tablet (20 mg total) by mouth daily.   ? cyanocobalamin 1000 MCG tablet Take 1 tablet (1,000 mcg total) by mouth daily.   ? ferrous sulfate 325 (65 FE) MG tablet Take 1 tablet by mouth 2 (two) times a day.    ? furosemide (LASIX) 20 MG tablet Take 1 tablet (20 mg total) by mouth daily.   ? melatonin 10 mg Tab Take 10 mg by mouth at bedtime.   ? metoprolol  succinate (TOPROL-XL) 50 MG 24 hr tablet Take 1 tablet (50 mg total) by mouth daily. (Patient taking differently: Take 25 mg by mouth daily. )   ? polyethylene glycol (MIRALAX) 17 gram packet Take 1 packet (17 g total) by mouth daily.   ? predniSONE (DELTASONE) 2.5 MG tablet Take 2.5 mg by mouth daily.   ? senna-docusate (SENNOSIDES-DOCUSATE SODIUM) 8.6-50 mg tablet Take 1 tablet by mouth 2 (two) times a day as needed for constipation.   ? traMADol (ULTRAM) 50 mg tablet Take 0.5 tablets (25 mg total) by mouth every 6 (six) hours as needed for severe pain (7-10).   ? traZODone (DESYREL) 50 MG tablet Take 1 tablet (50 mg total) by mouth daily as needed (anxiety).   ? umeclidinium-vilanterol (ANORO ELLIPTA) 62.5-25 mcg/actuation inhaler Inhale 1 puff daily.       Labs:  Results for orders placed or performed in visit on 01/20/20   Basic Metabolic Panel   Result Value Ref Range    Sodium 143 136 - 145 mmol/L    Potassium 3.8 3.5 - 5.0 mmol/L    Chloride 105 98 - 107 mmol/L    CO2 32 (H) 22 - 31 mmol/L    Anion Gap, Calculation 6 5 - 18 mmol/L    Glucose 83 70 - 125 mg/dL    Calcium 8.4 (L) 8.5 - 10.5 mg/dL    BUN 17 8 - 28 mg/dL    Creatinine 0.65 0.60 - 1.10 mg/dL    GFR MDRD Af Amer >60 >60 mL/min/1.73m2    GFR MDRD Non Af Amer >60 >60 mL/min/1.73m2     Lab Results   Component Value Date    WBC 12.6 (H) 01/20/2020    HGB 7.7 (L) 01/23/2020    HCT 24.0 (L) 01/20/2020     (H) 01/20/2020     01/20/2020     Lab Results   Component Value Date    TSH 2.39 10/16/2019     Vitamin D, Total (25-Hydroxy)   Date Value Ref Range Status   01/20/2020 28.7 (L) 30.0 - 80.0 ng/mL Final       Lab Results   Component Value Date    [id number] 1,056 (H) 10/16/2019       Assessment/Plan:    Commuted left femoral neck fracture status post left hip hemiarthroplasty on 1/13/2020: Given weightbearing as tolerated, incisional cares as directed, follow-up with Ortho as ordered    DVT prophylaxis with history of A. Fib: Continue  Eliquis 5 mg twice daily    Pain control: Continue Tylenol 1000 mg 3 times daily, continue tramadol 25mg q6h PRN (using daily)    ABLA: initial Hgb 12.6, last 7.7.  FeSO4 325mg increased to BID. Recheck CBC/folate on 1/27    Chronic CLL with leukocytosis and aspiration pneumonitis on chronic steroids: Continue prednisone 2.5 mg daily, last WBC 12.6.  Continue anoro.  Currently on 2 L nasal cannula, continue albuterol nebs three times a day, will wean both as tolerated. Sats >97%    History of aspiration: Speech evaluated, no change in diet    Vitamin D deficiency: increase D3 to 2000 units daily    Vitamin B 12 deficiency: Continue cyanocobalamin 1000mcg daily    HTN: decrease metoprolol succinate to 12.5mg daily per SBP <110    HFpEF: stable per hospital report, continue lasix 20mg daily, last 166lb    Depression: Continue citalopram 20 mg daily     Insomnia: Continue melatonin 10 mg at bedtime and will decrease trazodone to 25mg at HS PRN    Constipation: Continue MiraLAX daily and senna S 1 tab two times a day PRN, nothing used    Disposition: Lives in memory care. [facility] 8/30        Electronically signed by: Sekou Singletary NP

## 2021-06-20 NOTE — LETTER
Letter by Sekou Singletary NP at      Author: Sekou Singletary NP Service: -- Author Type: --    Filed:  Encounter Date: 2/3/2020 Status: (Other)         Patient: Cha Smith   MR Number: [id number]   YOB: 1933   Date of Visit: 2/3/2020     [facility]      Facility:    [facility] [[id number]]  Code Status: DNR      Chief Complaint/Reason for Visit:  101  Chief Complaint   Patient presents with   ? Review Of Multiple Medical Conditions       HPI:   Cha is a 86 y.o. female who is a recent transfer from [facility] with an admission on 1/12/20 and discharge on 1/16/20.  She has a past medical history of dementia, A. 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  She underwent repair, given weightbearing as tolerated, Tylenol and tramadol for pain control.  Also developed ABLA with an initial hemoglobin at 12.6, at discharge 8.2.  Scheduled to follow-up with Ortho in 2 to 3 weeks.  Her CLL has chronic leukocytosis though may be contributing with steroid use.  She also had acute respiratory failure with hypoxia this history of aspiration pneumonitis, needed supplemental oxygen, has weaned down to 2 L.  Admission chest x-ray showed right upper lobe atelectasis, left upper lobe atelectasis likely due to chronic pneumonitis.  Per family was intermittently treated with antibiotics and steroid burst.  Recently completed course of Augmentin, her procalcitonin was negative no infectious symptoms noted.  We will continue to wean oxygen.  Per A. fib, family was consulted on ongoing anticoagulation though has deemed to continue, she is in normal sinus rhythm currently and she has normal  EF.  She was then discharged to TCU for rehab.    Today will assess BP, weaning off O2 and recent ortho f/u and therapy progress.    Past Medical History:  Past Medical History:   Diagnosis Date   ? A-fib (H)    ? Breast cancer (H)    ? CLL (chronic lymphocytic leukemia) (H)    ? COPD (chronic obstructive pulmonary disease) (H)    ? Dementia (H)    ? DVT (deep venous thrombosis) (H)    ? Hypertension    ? Lung cancer (H)            Surgical History:  Past Surgical History:   Procedure Laterality Date   ? BREAST BIOPSY     ? HERNIA REPAIR     ? HYSTERECTOMY     ? MASTECTOMY     ? AR PARTIAL HIP REPLACEMENT Left 1/13/2020    Procedure: HEMIARTHROPLASTY, HIP, UNIPOLAR;  Surgeon: Dav Steward MD;  Location: [facility];  Service: Orthopedics       Family History:   No family history on file.    Social History:    Social History     Socioeconomic History   ? Marital status:      Spouse name: Not on file   ? Number of children: Not on file   ? Years of education: Not on file   ? Highest education level: Not on file   Occupational History   ? Not on file   Social Needs   ? Financial resource strain: Not on file   ? Food insecurity:     Worry: Not on file     Inability: Not on file   ? Transportation needs:     Medical: Not on file     Non-medical: Not on file   Tobacco Use   ? Smoking status: Former Smoker   ? Smokeless tobacco: Former User   Substance and Sexual Activity   ? Alcohol use: Not Currently     Frequency: Never     Binge frequency: Never   ? Drug use: Not Currently   ? Sexual activity: Not Currently   Lifestyle   ? Physical activity:     Days per week: Not on file     Minutes per session: Not on file   ? Stress: Not on file   Relationships   ? Social connections:     Talks on phone: Not on file     Gets together: Not on file     Attends Hindu service: Not on file     Active member of club or organization: Not on file     Attends meetings of clubs or organizations: Not on file      "Relationship status: Not on file   ? Intimate partner violence:     Fear of current or ex partner: Not on file     Emotionally abused: Not on file     Physically abused: Not on file     Forced sexual activity: Not on file   Other Topics Concern   ? Not on file   Social History Narrative   ? Not on file          Review of Systems   Constitutional: Positive for activity change. Negative for appetite change, chills, diaphoresis and fatigue.        No acute issues   HENT: Positive for hearing loss. Negative for congestion.    Eyes: Negative.    Respiratory: Negative for shortness of breath and wheezing.    Cardiovascular: Positive for leg swelling.   Gastrointestinal: Negative for abdominal distention, abdominal pain, blood in stool, constipation and diarrhea.   Endocrine: Negative.    Genitourinary: Negative for difficulty urinating.   Musculoskeletal:        Denies pain   Skin: Positive for wound.        L hip incision   Allergic/Immunologic: Negative.    Neurological: Negative for dizziness, tremors, speech difficulty and light-headedness.   Hematological: Negative.    Psychiatric/Behavioral: Positive for confusion. Negative for agitation and hallucinations. The patient is not nervous/anxious.        Vitals:    02/03/20 0711   BP: 137/75   Pulse: 81   Resp: 16   Temp: 97  F (36.1  C)   SpO2: 96%   Weight: 170 lb (77.1 kg)   Height: 5' 6\" (1.676 m)       Physical Exam  Constitutional:       Comments: No acute issues   HENT:      Head: Normocephalic and atraumatic.      Right Ear: External ear normal.      Left Ear: External ear normal.      Mouth/Throat:      Pharynx: No oropharyngeal exudate or posterior oropharyngeal erythema.   Eyes:      General:         Right eye: No discharge.         Left eye: No discharge.   Neck:      Musculoskeletal: Normal range of motion and neck supple.   Cardiovascular:      Rate and Rhythm: Normal rate.      Heart sounds: No murmur. No friction rub. No gallop.       Comments: Hx of " A-fib  Pulmonary:      Effort: No respiratory distress.      Breath sounds: No wheezing or rales.      Comments: Dim, MORE, RA  Abdominal:      General: There is no distension.      Palpations: Abdomen is soft.      Tenderness: There is no abdominal tenderness. There is no guarding.      Comments: Denies constipation or diarrhea   Genitourinary:     Comments: incontinent  Musculoskeletal:      Right lower leg: Edema present.      Left lower leg: Edema present.      Comments: 1+BLE, wc bound, denies pain   Skin:     General: Skin is warm and dry.      Comments: L hip incision intact   Neurological:      Mental Status: She is alert. Mental status is at baseline. She is disoriented.      Comments: A/O x1   Psychiatric:         Mood and Affect: Mood normal.         Medication List:  Current Outpatient Medications   Medication Sig   ? calcium, as carbonate, (OS-MAGUI) 500 mg calcium (1,250 mg) tablet Take 1 tablet by mouth daily.   ? cholecalciferol, vitamin D3, 5,000 unit Tab Take 1 tablet by mouth daily.   ? acetaminophen (TYLENOL) 500 MG tablet Take 2 tablets (1,000 mg total) by mouth 3 (three) times a day.   ? albuterol (PROVENTIL) 2.5 mg /3 mL (0.083 %) nebulizer solution Take 3 mL (2.5 mg total) by nebulization 3 (three) times a day as needed for wheezing.   ? albuterol (PROVENTIL) 2.5 mg /3 mL (0.083 %) nebulizer solution Take 3 mL (2.5 mg total) by nebulization 3 (three) times a day.   ? apixaban ANTICOAGULANT (ELIQUIS) 5 mg Tab tablet Take 1 tablet (5 mg total) by mouth 2 (two) times a day.   ? artificial tear,dxtrn-hpm-gly, (GENTEAL TEARS MODERATE) 0.1-0.3-0.2 % Drop ophthalmic drops Administer 1 drop to the right eye 2 (two) times a day.   ? citalopram (CELEXA) 20 MG tablet Take 1 tablet (20 mg total) by mouth daily.   ? cyanocobalamin 1000 MCG tablet Take 1 tablet (1,000 mcg total) by mouth daily.   ? ferrous sulfate 325 (65 FE) MG tablet Take 1 tablet by mouth 2 (two) times a day.    ? furosemide (LASIX) 20 MG  tablet Take 1 tablet (20 mg total) by mouth daily.   ? melatonin 10 mg Tab Take 10 mg by mouth at bedtime.   ? metoprolol succinate (TOPROL-XL) 50 MG 24 hr tablet Take 1 tablet (50 mg total) by mouth daily. (Patient taking differently: Take 12.5 mg by mouth daily. )   ? polyethylene glycol (MIRALAX) 17 gram packet Take 1 packet (17 g total) by mouth daily.   ? predniSONE (DELTASONE) 2.5 MG tablet Take 2.5 mg by mouth daily.   ? senna-docusate (SENNOSIDES-DOCUSATE SODIUM) 8.6-50 mg tablet Take 1 tablet by mouth 2 (two) times a day as needed for constipation.   ? traMADol (ULTRAM) 50 mg tablet Take 25 mg by mouth every 6 (six) hours as needed for pain.   ? umeclidinium-vilanterol (ANORO ELLIPTA) 62.5-25 mcg/actuation inhaler Inhale 1 puff daily.       Labs:  Results for orders placed or performed in visit on 01/20/20   Basic Metabolic Panel   Result Value Ref Range    Sodium 143 136 - 145 mmol/L    Potassium 3.8 3.5 - 5.0 mmol/L    Chloride 105 98 - 107 mmol/L    CO2 32 (H) 22 - 31 mmol/L    Anion Gap, Calculation 6 5 - 18 mmol/L    Glucose 83 70 - 125 mg/dL    Calcium 8.4 (L) 8.5 - 10.5 mg/dL    BUN 17 8 - 28 mg/dL    Creatinine 0.65 0.60 - 1.10 mg/dL    GFR MDRD Af Amer >60 >60 mL/min/1.73m2    GFR MDRD Non Af Amer >60 >60 mL/min/1.73m2     Lab Results   Component Value Date    WBC 12.1 (H) 01/27/2020    HGB 7.8 (L) 01/27/2020    HCT 26.5 (L) 01/27/2020     (H) 01/27/2020     01/27/2020     Lab Results   Component Value Date    TSH 2.39 10/16/2019     Vitamin D, Total (25-Hydroxy)   Date Value Ref Range Status   01/20/2020 28.7 (L) 30.0 - 80.0 ng/mL Final       Lab Results   Component Value Date    [id number] 1,056 (H) 10/16/2019       Assessment/Plan:    Commuted left femoral neck fracture status post left hip hemiarthroplasty on 1/13/2020: Given weightbearing as tolerated, increased supplementation, f/u in 4-6 wks    DVT prophylaxis with history of A. Fib: Continue Eliquis 5 mg twice daily    Pain  control: Continue Tylenol 1000 mg 3 times daily, dc tramadol    ABLA: initial Hgb 12.6, last 7.7.  FeSO4 325mg increased to two times a day prior. Folate WNLs. Recheck CBC    Chronic CLL with leukocytosis and aspiration pneumonitis on chronic steroids: Continue prednisone 2.5 mg daily, last WBC 12.6.  Continue anoro.  Currently weaned off O2, continue albuterol nebs three times a day    History of aspiration: Speech evaluated, no change in diet    Vitamin D deficiency: increase D3 to 5000 units daily    Vitamin B 12 deficiency: Continue cyanocobalamin 1000mcg daily    Calcium supplement: increased to 1250mg daily    HTN: continue metoprolol succinate 12.5mg daily, SBP <130    HFpEF: stable per hospital report, continue lasix 20mg daily, last 166lb    Depression: Continue citalopram 20 mg daily     Insomnia: Continue melatonin 10 mg at bedtime. DC trazodone    Constipation: Continue MiraLAX daily and senna S 1 tab two times a day PRN, nothing used    Disposition: Lives in memory care. [facility] 8/30        Electronically signed by: Sekou Singletary NP

## 2021-06-20 NOTE — LETTER
Letter by Sekou Singletary NP at      Author: Sekou Singletary NP Service: -- Author Type: --    Filed:  Encounter Date: 1/17/2020 Status: Signed         Patient: Cha Smith   MR Number: [id number]   YOB: 1933   Date of Visit: 1/17/2020     [facility]      Facility:    [facility] [[id number]]  Code Status: DNR      Chief Complaint/Reason for Visit:  101  Chief Complaint   Patient presents with   ? Review Of Multiple Medical Conditions       HPI:   Cha is a 86 y.o. female who is a recent transfer from [facility] with an admission on 1/12/20 and discharge on 1/16/20.  She has a past medical history of dementia, A. 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  She underwent repair, given weightbearing as tolerated, Tylenol and tramadol for pain control.  Also developed ABLA with an initial hemoglobin at 12.6, at discharge 8.2.  Scheduled to follow-up with Ortho in 2 to 3 weeks.  Her CLL has chronic leukocytosis though may be contributing with steroid use.  She also had acute respiratory failure with hypoxia this history of aspiration pneumonitis, needed supplemental oxygen, has weaned down to 2 L.  Admission chest x-ray showed right upper lobe atelectasis, left upper lobe atelectasis likely due to chronic pneumonitis.  Per family was intermittently treated with antibiotics and steroid burst.  Recently completed course of Augmentin, her procalcitonin was negative no infectious symptoms noted.  We will continue to wean oxygen.  Per A. fib, family was consulted on ongoing anticoagulation though has deemed to continue, she is in normal sinus rhythm currently and she has  normal EF.  She was then discharged to TCU for rehab.    Past Medical History:  Past Medical History:   Diagnosis Date   ? A-fib (H)    ? Breast cancer (H)    ? CLL (chronic lymphocytic leukemia) (H)    ? COPD (chronic obstructive pulmonary disease) (H)    ? Dementia (H)    ? DVT (deep venous thrombosis) (H)    ? Hypertension    ? Lung cancer (H)            Surgical History:  Past Surgical History:   Procedure Laterality Date   ? BREAST BIOPSY     ? HERNIA REPAIR     ? HYSTERECTOMY     ? MASTECTOMY     ? MT PARTIAL HIP REPLACEMENT Left 1/13/2020    Procedure: HEMIARTHROPLASTY, HIP, UNIPOLAR;  Surgeon: Dav Steward MD;  Location: [facility];  Service: Orthopedics       Family History:   No family history on file.    Social History:    Social History     Socioeconomic History   ? Marital status:      Spouse name: Not on file   ? Number of children: Not on file   ? Years of education: Not on file   ? Highest education level: Not on file   Occupational History   ? Not on file   Social Needs   ? Financial resource strain: Not on file   ? Food insecurity:     Worry: Not on file     Inability: Not on file   ? Transportation needs:     Medical: Not on file     Non-medical: Not on file   Tobacco Use   ? Smoking status: Former Smoker   ? Smokeless tobacco: Former User   Substance and Sexual Activity   ? Alcohol use: Not Currently     Frequency: Never     Binge frequency: Never   ? Drug use: Not Currently   ? Sexual activity: Not Currently   Lifestyle   ? Physical activity:     Days per week: Not on file     Minutes per session: Not on file   ? Stress: Not on file   Relationships   ? Social connections:     Talks on phone: Not on file     Gets together: Not on file     Attends Voodoo service: Not on file     Active member of club or organization: Not on file     Attends meetings of clubs or organizations: Not on file     Relationship status: Not on file   ? Intimate partner violence:     Fear of current  "or ex partner: Not on file     Emotionally abused: Not on file     Physically abused: Not on file     Forced sexual activity: Not on file   Other Topics Concern   ? Not on file   Social History Narrative   ? Not on file          Review of Systems   Constitutional: Positive for activity change, appetite change and fatigue. Negative for chills and diaphoresis.   HENT: Positive for hearing loss. Negative for congestion.    Eyes: Negative.    Respiratory: Negative for shortness of breath and wheezing.    Cardiovascular: Positive for leg swelling.   Gastrointestinal: Negative for abdominal distention, abdominal pain, blood in stool, constipation and diarrhea.   Endocrine: Negative.    Genitourinary: Negative for difficulty urinating.   Musculoskeletal:        Denies pain   Skin: Positive for wound.        L hip incision   Allergic/Immunologic: Negative.    Neurological: Negative for dizziness, tremors, speech difficulty and light-headedness.   Hematological: Negative.    Psychiatric/Behavioral: Positive for confusion and hallucinations. Negative for agitation. The patient is not nervous/anxious.         States that she is \"in heaven\"       Vitals:    01/17/20 1700   BP: 97/56   Pulse: 77   Resp: 18   Temp: 97  F (36.1  C)   SpO2: 95%       Physical Exam  HENT:      Head: Normocephalic and atraumatic.      Right Ear: External ear normal.      Left Ear: External ear normal.      Mouth/Throat:      Pharynx: No oropharyngeal exudate or posterior oropharyngeal erythema.   Eyes:      General:         Right eye: No discharge.         Left eye: No discharge.   Neck:      Musculoskeletal: Normal range of motion and neck supple.   Cardiovascular:      Rate and Rhythm: Normal rate.      Heart sounds: No murmur. No friction rub. No gallop.       Comments: Hx of A-fib  Pulmonary:      Effort: No respiratory distress.      Breath sounds: No wheezing or rales.      Comments: Dim, 2L NC, MORE  Abdominal:      General: There is no " distension.      Palpations: Abdomen is soft.      Tenderness: There is no abdominal tenderness. There is no guarding.      Comments: Denies constipation or diarrhea   Genitourinary:     Comments: incontinent  Musculoskeletal:      Right lower leg: Edema present.      Left lower leg: Edema present.      Comments: 1+BLE, wc bound, denies pain   Skin:     General: Skin is warm and dry.      Comments: L hip incision intact   Neurological:      Mental Status: She is alert. She is disoriented.      Comments: Thinks she is in heaven, A/O x1   Psychiatric:         Mood and Affect: Mood normal.         Medication List:  Current Outpatient Medications   Medication Sig   ? ferrous sulfate 325 (65 FE) MG tablet Take 1 tablet by mouth daily with breakfast.   ? acetaminophen (TYLENOL) 500 MG tablet Take 2 tablets (1,000 mg total) by mouth 3 (three) times a day.   ? albuterol (PROVENTIL) 2.5 mg /3 mL (0.083 %) nebulizer solution Take 3 mL (2.5 mg total) by nebulization 3 (three) times a day as needed for wheezing.   ? albuterol (PROVENTIL) 2.5 mg /3 mL (0.083 %) nebulizer solution Take 3 mL (2.5 mg total) by nebulization 3 (three) times a day.   ? apixaban ANTICOAGULANT (ELIQUIS) 5 mg Tab tablet Take 1 tablet (5 mg total) by mouth 2 (two) times a day.   ? artificial tear,dxtrn-hpm-gly, (GENTEAL TEARS MODERATE) 0.1-0.3-0.2 % Drop ophthalmic drops Administer 1 drop to the right eye 2 (two) times a day.   ? cholecalciferol, vitamin D3, 1,000 unit (25 mcg) tablet Take 1 tablet (1,000 Units total) by mouth daily.   ? citalopram (CELEXA) 20 MG tablet Take 1 tablet (20 mg total) by mouth daily.   ? cyanocobalamin 1000 MCG tablet Take 1 tablet (1,000 mcg total) by mouth daily.   ? furosemide (LASIX) 20 MG tablet Take 1 tablet (20 mg total) by mouth daily.   ? melatonin 10 mg Tab Take 10 mg by mouth at bedtime.   ? metoprolol succinate (TOPROL-XL) 50 MG 24 hr tablet Take 1 tablet (50 mg total) by mouth daily. (Patient taking differently:  Take 25 mg by mouth daily. )   ? polyethylene glycol (MIRALAX) 17 gram packet Take 1 packet (17 g total) by mouth daily.   ? predniSONE (DELTASONE) 2.5 MG tablet Take 2.5 mg by mouth daily.   ? senna-docusate (SENNOSIDES-DOCUSATE SODIUM) 8.6-50 mg tablet Take 1 tablet by mouth 2 (two) times a day as needed for constipation.   ? traMADol (ULTRAM) 50 mg tablet Take 0.5 tablets (25 mg total) by mouth every 6 (six) hours as needed for severe pain (7-10).   ? traZODone (DESYREL) 50 MG tablet Take 1 tablet (50 mg total) by mouth daily as needed (anxiety).   ? umeclidinium-vilanterol (ANORO ELLIPTA) 62.5-25 mcg/actuation inhaler Inhale 1 puff daily.       Labs:  Results for orders placed or performed during the hospital encounter of 01/12/20   Basic Metabolic Panel   Result Value Ref Range    Sodium 140 136 - 145 mmol/L    Potassium 4.0 3.5 - 5.0 mmol/L    Chloride 106 98 - 107 mmol/L    CO2 29 22 - 31 mmol/L    Anion Gap, Calculation 5 5 - 18 mmol/L    Glucose 113 70 - 125 mg/dL    Calcium 8.2 (L) 8.5 - 10.5 mg/dL    BUN 27 8 - 28 mg/dL    Creatinine 0.73 0.60 - 1.10 mg/dL    GFR MDRD Af Amer >60 >60 mL/min/1.73m2    GFR MDRD Non Af Amer >60 >60 mL/min/1.73m2     Lab Results   Component Value Date    WBC 13.3 (H) 01/16/2020    HGB 8.2 (L) 01/16/2020    HCT 26.0 (L) 01/16/2020    MCV 97 01/16/2020     01/16/2020     Lab Results   Component Value Date    TSH 2.39 10/16/2019     No results found for: [id number]    Lab Results   Component Value Date    [id number] 1,056 (H) 10/16/2019       Assessment/Plan:    Commuted left femoral neck fracture status post left hip hemiarthroplasty on 1/13/2020: Given weightbearing as tolerated, incisional cares as directed, follow-up with Ortho in 2 to 3 weeks    DVT prophylaxis with history of A. Fib: Continue Eliquis 5 mg twice daily    Pain control: Continue Tylenol 1000 mg 3 times daily, tramadol 25 mg every 6 hours as needed (no doses given). Denies pain    ABLA: initial Hgb  12.6, last 8.2. Start FeSO4 325mg daily. Recheck CBC on 1/20    Chronic CLL with leukocytosis and aspiration pneumonitis on chronic steroids: Continue prednisone 2.5 mg daily, last WBC 13.3, recheck CBC.  Continue anoro.  Currently on 2 L nasal cannula, continue albuterol nebs three times a day, will wean both as tolerated    History of aspiration: Speech to evaluate and treat    Vitamin D deficiency: Continue D3 1000 units daily, recheck    Vitamin B 12 deficiency: Continue cyanocobalamin 1000mcg daily    HTN: decrease metoprolol succinate to 25mg daily per SBP <110, monitor BP/HR two times a day x1 wk    HFpEF: stable per hospital report, continue lasix 20mg daily, no current wt    Depression: Continue citalopram 20 mg daily     Insomnia: Continue melatonin 10 mg at bedtime and trazodone 50 mg at bedtime PRN (plan to dc next visit if not used)    Constipation: Continue MiraLAX daily and senna S 1 tab two times a day PRN    Disposition: Lives in memory care.  Pending cognitive assessment    The care plan has been reviewed and all orders signed. Changes to care plan, if any, as noted. Otherwise, continue care plan of care.  The total time spent with this patient was 35 minutes, with greater than 50% spent in counseling and coordination of care that included multiple issues per discussion about being in heaven, pain control and therapy, which lasted 18 minutes.     Post Discharge Medication Reconciliation Status: discharge medications reconciled and changed, per note/orders (see AVS)    Electronically signed by: Sekou Singletary NP

## 2021-06-20 NOTE — LETTER
Letter by Qing Leon MBBS at      Author: Qing Leon MBBS Service: -- Author Type: --    Filed:  Encounter Date: 1/29/2020 Status: (Other)         Patient: Cha Smith   MR Number: [id number]   YOB: 1933   Date of Visit: 1/29/2020       [facility] Admission note      Patient: Cha Smith  MRN: [id number]  Date of Service: 1/29/2020      [facility] [[id number]]  Reason for Visit     Chief Complaint   Patient presents with   ? Review Of Multiple Medical Conditions       Code Status     DNR    Assessment     -History of a left femoral neck displaced and comminuted fracture status post left hip hemiarthroplasty on 1/13/2020  -Pain management  - ABLA with severe anemia noted on recheck with a hemoglobin of 7.4  -Hypotension noted in the TCU with low blood pressures  -History of acute hypoxic respiratory failure currently on 2 L  -History of aspiration pneumonitis  -DVT prophylaxiS  - Underlying history of chronic CLL with chronic leukocytosis felt to be stable- RC WBC AT 12.6 TODAY  -History of lung and breast cancer currently in remission  -History of COPD  -Chronic atrial fibrillation currently on Eliquis  - Profound dementia with patient alert and oriented only to self-   -Generalized weakness  -Malnutrition with a low albumin  -Hypocalcemia recheck calcium is 8.4    Plan     Patient admitted to the TCU for strengthening and rehab.  She is weightbearing as tolerated left lower extremity postoperatively with posterior hip precautions  Continue with her incisional cares  She is wheelchair-bound and making poor progress in therapy so far.  She has a follow-up with orthopedics today we will follow-up in the recommendations.  Recheck labs reviewed in the TCU and her hemoglobin is still low at 7.8 she is on iron supplementation.  Suspect this could be related to chronic anemia with the setting of CLL  She is on low-dose of prednisone daily  Blood pressures  reviewed and she is on a low-dose of metoprolol 12.5 twice daily with stable blood pressures noted today.  Trazodone has been reduced to 25 mg at bedtime as needed  Due to poor intake she has been started on Ensure supplementation continue to monitor weights  Continue with the PT OT and rehab      History     Patient is a very pleasant 86 y.o. female who is admitted to TCU  Patient is admitted with underlying history of dementia.  She is pleasantly confused and alert and oriented only to self. SLUMS 8/30 she remains confused but no significant behaviors reported by staff.  She remains confused with no behaviors on a low-dose of trazodone now    Patient admitted with a displaced comminuted left femoral neck fracture and underwent left hip hemiarthroplasty on 1/13/2020  She has tolerated the procedure well and has been discharged to the TCU for strengthening and rehab.  Unfortunately progress is slow and she remains wheelchair-bound follow-up with orthopedics as scheduled.    Postoperative course was complicated by acute hypoxic respiratory failure.  Currently she is on 2 L of oxygen by nasal cannula while in the hospital she needed as much as 8 L of oxygen.  It was felt to be secondary to underlying lung disease as well as from acute fracture and possibly with anemia.  She has been currently weaned off oxygen  She also was noted to have acute aspiration pneumonitis.  Imaging did show right upper lobe atelectasis with left upper lobe atelectasis with chronic pneumonitis.  As per her family this is chronic symptoms   She continues to some intermittent cough but no longer febrile    She has ongoing history of atrial fibrillation heart rates were stable apparently in the hospital was elected to continue with her current anticoagulation regimen  She has persistent anemia recheck hemoglobin is still low at 7.8.  Work-up was initiated due to presence of macrocytosis B12 and folate are normal  SPECT could be related to her  chronic CLL    Past Medical History     Active Ambulatory (Non-Hospital) Problems    Diagnosis   ? Primary insomnia   ? CLL (chronic lymphocytic leukemia) (H)   ? Aspiration pneumonitis (H)   ? TIA (transient ischemic attack)   ? Closed displaced fracture of left femoral neck (H)   ? Closed fracture of neck of left femur, initial encounter (H)   ? Atrial fibrillation with RVR (H)   ? COPD (chronic obstructive pulmonary disease) with acute bronchitis (H)   ? Moderate dementia with behavioral disturbance (H)   ? Acute deep vein thrombosis (DVT) of distal vein of left lower extremity (H)   ? Typical atrial flutter (H)   ? Adenocarcinoma, lung, left (H)   ? Hypertension   ? 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 A-fib (H)    ? Breast cancer (H)    ? CLL (chronic lymphocytic leukemia) (H)    ? COPD (chronic obstructive pulmonary disease) (H)    ? Dementia (H)    ? DVT (deep venous thrombosis) (H)    ? Hypertension    ? Lung cancer (H)        Past Social History     Reviewed, and she  reports that she has quit smoking. She has quit using smokeless tobacco. She reports previous alcohol use. She reports previous drug use.    Family History     Reviewed, and history of prostate cancer in the maternal grandfather.  Ovarian cancer in maternal aunt who  at age 70 his prostate cancer in maternal uncle in all 8 uncles had prostate cancer  Mother  from respiratory distress at age 79 father  from dementia and old age    Medication List   Post Discharge Medication Reconciliation Status: discharge medications reconciled and changed, per note/orders (see AVS)   Current Outpatient Medications on File Prior to Visit   Medication Sig Dispense Refill   ? acetaminophen (TYLENOL) 500 MG tablet Take 2 tablets (1,000 mg total) by mouth 3 (three) times a day. 100 tablet 0   ? albuterol (PROVENTIL) 2.5 mg /3 mL (0.083 %) nebulizer solution Take  3 mL (2.5 mg total) by nebulization 3 (three) times a day as needed for wheezing. 30 vial 0   ? albuterol (PROVENTIL) 2.5 mg /3 mL (0.083 %) nebulizer solution Take 3 mL (2.5 mg total) by nebulization 3 (three) times a day. 30 vial 0   ? apixaban ANTICOAGULANT (ELIQUIS) 5 mg Tab tablet Take 1 tablet (5 mg total) by mouth 2 (two) times a day. 60 tablet 0   ? artificial tear,dxtrn-hpm-gly, (GENTEAL TEARS MODERATE) 0.1-0.3-0.2 % Drop ophthalmic drops Administer 1 drop to the right eye 2 (two) times a day. 1 Bottle 0   ? cholecalciferol, vitamin D3, 1,000 unit (25 mcg) tablet Take 1 tablet (1,000 Units total) by mouth daily. (Patient taking differently: Take 2,000 Units by mouth daily. ) 30 tablet 0   ? citalopram (CELEXA) 20 MG tablet Take 1 tablet (20 mg total) by mouth daily. 30 tablet 0   ? cyanocobalamin 1000 MCG tablet Take 1 tablet (1,000 mcg total) by mouth daily. 30 tablet 0   ? ferrous sulfate 325 (65 FE) MG tablet Take 1 tablet by mouth 2 (two) times a day.      ? furosemide (LASIX) 20 MG tablet Take 1 tablet (20 mg total) by mouth daily. 30 tablet 0   ? melatonin 10 mg Tab Take 10 mg by mouth at bedtime. 30 tablet 0   ? metoprolol succinate (TOPROL-XL) 50 MG 24 hr tablet Take 1 tablet (50 mg total) by mouth daily. (Patient taking differently: Take 12.5 mg by mouth daily. ) 30 tablet 0   ? polyethylene glycol (MIRALAX) 17 gram packet Take 1 packet (17 g total) by mouth daily. 30 packet 0   ? predniSONE (DELTASONE) 2.5 MG tablet Take 2.5 mg by mouth daily. 30 tablet 0   ? senna-docusate (SENNOSIDES-DOCUSATE SODIUM) 8.6-50 mg tablet Take 1 tablet by mouth 2 (two) times a day as needed for constipation. 30 tablet 0   ? traMADol (ULTRAM) 50 mg tablet Take 25 mg by mouth every 6 (six) hours as needed for pain.     ? traZODone (DESYREL) 50 MG tablet Take 1 tablet (50 mg total) by mouth daily as needed (anxiety). 30 tablet 0   ? umeclidinium-vilanterol (ANORO ELLIPTA) 62.5-25 mcg/actuation inhaler Inhale 1 puff  daily. 1 Inhaler 0     No current facility-administered medications on file prior to visit.        Allergies     No Known Allergies    Review of Systems   A comprehensive review of 14 systems was done. Pertinent findings noted here and in history of present illness. All the rest negative.  Constitutional: Negative.  Negative for fever, chills, she has activity change, appetite change and fatigue.  Losing weight not eating well at all eating less than 50% of them offered meal  HENT: Negative for congestion and facial swelling.    Eyes: Negative for photophobia, redness and visual disturbance.   Respiratory: Negative for cough and chest tightness.    Cardiovascular: Negative for chest pain, palpitations and leg swelling.   Gastrointestinal: Negative for nausea, diarrhea, constipation, blood in stool and abdominal distention.   Genitourinary: Negative.    Musculoskeletal: Negative.  Confused and complaining of low back pain  Skin: Negative.    Neurological: Negative for dizziness, tremors, syncope, weakness, light-headedness and headaches.   Hematological: Does not bruise/bleed easily.   Psychiatric/Behavioral:  impaired memory recall and judgment      Physical Exam     Recent Vitals 1/27/2020   Height -   Weight 166 lbs   BSA (m2) 1.87 m2   /60   Pulse 70   Temp 98   Temp src -   SpO2 97   Some recent data might be hidden     Recheck blood pressure one 93 /57 WT 167LB  Constitutional: Oriented to person,  and appears well-developed.  Patient is obese  On 2 L of oxygen by nasal cannula  HEENT:  Normocephalic and atraumatic.  Eyes: Conjunctivae and EOM are normal. Pupils are equal, round, and reactive to light. No discharge.  No scleral icterus. Nose normal. Mouth/Throat: Oropharynx is clear and moist. No oropharyngeal exudate.    NECK: Normal range of motion. Neck supple. No JVD present. No tracheal deviation present. No thyromegaly present.   CARDIOVASCULAR: Normal rate, regular rhythm and intact distal pulses.   Exam reveals no gallop and no friction rub.  Systolic murmur present.  PULMONARY: Effort normal and breath sounds normal. No respiratory distress.No Wheezing or rales.  ABDOMEN: Soft. Bowel sounds are normal. No distension and no mass.  There is no tenderness. There is no rebound and no guarding. No HSM.  MUSCULOSKELETAL: Normal range of motion. No edema and no tenderness. Mild kyphosis, no tenderness.  Has an intact dressing over her left hip  LYMPH NODES: Has no cervical, supraclavicular, axillary and groin adenopathy.   NEUROLOGICAL: Alert and oriented to person, . No cranial nerve deficit.  Normal muscle tone. Coordination normal.   GENITOURINARY: Deferred exam.  SKIN: Skin is warm and dry. No rash noted. No erythema. No pallor.   EXTREMITIES: No cyanosis, no clubbing, no edema. No Deformity.  PSYCHIATRIC: Stable mood, affect and behavior.  Recall impaired judgment impaired memory is impaired      Lab Results     Recent Results (from the past 240 hour(s))   Basic Metabolic Panel    Collection Time: 01/20/20  8:22 AM   Result Value Ref Range    Sodium 143 136 - 145 mmol/L    Potassium 3.8 3.5 - 5.0 mmol/L    Chloride 105 98 - 107 mmol/L    CO2 32 (H) 22 - 31 mmol/L    Anion Gap, Calculation 6 5 - 18 mmol/L    Glucose 83 70 - 125 mg/dL    Calcium 8.4 (L) 8.5 - 10.5 mg/dL    BUN 17 8 - 28 mg/dL    Creatinine 0.65 0.60 - 1.10 mg/dL    GFR MDRD Af Amer >60 >60 mL/min/1.73m2    GFR MDRD Non Af Amer >60 >60 mL/min/1.73m2   Vitamin D, Total (25-Hydroxy)    Collection Time: 01/20/20  8:22 AM   Result Value Ref Range    Vitamin D, Total (25-Hydroxy) 28.7 (L) 30.0 - 80.0 ng/mL   HM2(CBC w/o Differential)    Collection Time: 01/20/20  8:22 AM   Result Value Ref Range    WBC 12.6 (H) 4.0 - 11.0 thou/uL    RBC 2.38 (L) 3.80 - 5.40 mill/uL    Hemoglobin 7.4 (L) 12.0 - 16.0 g/dL    Hematocrit 24.0 (L) 35.0 - 47.0 %     (H) 80 - 100 fL    MCH 31.1 27.0 - 34.0 pg    MCHC 30.8 (L) 32.0 - 36.0 g/dL    RDW 13.3 11.0 - 14.5 %     Platelets 337 140 - 440 thou/uL    MPV 9.7 8.5 - 12.5 fL   Hemoglobin    Collection Time: 01/23/20  5:18 AM   Result Value Ref Range    Hemoglobin 7.7 (L) 12.0 - 16.0 g/dL   HM2(CBC w/o Differential)    Collection Time: 01/27/20  4:30 AM   Result Value Ref Range    WBC 12.1 (H) 4.0 - 11.0 thou/uL    RBC 2.50 (L) 3.80 - 5.40 mill/uL    Hemoglobin 7.8 (L) 12.0 - 16.0 g/dL    Hematocrit 26.5 (L) 35.0 - 47.0 %     (H) 80 - 100 fL    MCH 31.2 27.0 - 34.0 pg    MCHC 29.4 (L) 32.0 - 36.0 g/dL    RDW 16.2 (H) 11.0 - 14.5 %    Platelets 395 140 - 440 thou/uL    MPV 9.3 8.5 - 12.5 fL   Folate, Serum    Collection Time: 01/27/20  4:30 AM   Result Value Ref Range    Folate 11.6 >=3.5 ng/mL        Lab Results   Component Value Date    [id number] 1,056 (H) 10/16/2019     Lab Results   Component Value Date    TSH 2.39 10/16/2019           JUAN A Camacho

## 2021-06-20 NOTE — LETTER
Letter by Sekou Singletary NP at      Author: Sekou Singletary NP Service: -- Author Type: --    Filed:  Encounter Date: 2/10/2020 Status: (Other)         Patient: Cha Smith   MR Number: [id number]   YOB: 1933   Date of Visit: 2/10/2020     [facility]      Facility:    [facility] [[id number]]  Code Status: DNR      Chief Complaint/Reason for Visit:  101  Chief Complaint   Patient presents with   ? Review Of Multiple Medical Conditions       HPI:   Cha is a 86 y.o. female who is a recent transfer from [facility] with an admission on 1/12/20 and discharge on 1/16/20.  She has a past medical history of dementia, A. 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  She underwent repair, given weightbearing as tolerated, Tylenol and tramadol for pain control.  Also developed ABLA with an initial hemoglobin at 12.6, at discharge 8.2.  Scheduled to follow-up with Ortho in 2 to 3 weeks.  Her CLL has chronic leukocytosis though may be contributing with steroid use.  She also had acute respiratory failure with hypoxia this history of aspiration pneumonitis, needed supplemental oxygen, has weaned down to 2 L.  Admission chest x-ray showed right upper lobe atelectasis, left upper lobe atelectasis likely due to chronic pneumonitis.  Per family was intermittently treated with antibiotics and steroid burst.  Recently completed course of Augmentin, her procalcitonin was negative no infectious symptoms noted.  We will continue to wean oxygen.  Per A. fib, family was consulted on ongoing anticoagulation though has deemed to continue, she is in normal sinus rhythm currently and she has  normal EF.  She was then discharged to TCU for rehab.    Today will assess pain control, CHF, recent labs and therapy progress.    Past Medical History:  Past Medical History:   Diagnosis Date   ? A-fib (H)    ? Breast cancer (H)    ? CLL (chronic lymphocytic leukemia) (H)    ? COPD (chronic obstructive pulmonary disease) (H)    ? Dementia (H)    ? DVT (deep venous thrombosis) (H)    ? Hypertension    ? Lung cancer (H)            Surgical History:  Past Surgical History:   Procedure Laterality Date   ? BREAST BIOPSY     ? HERNIA REPAIR     ? HYSTERECTOMY     ? MASTECTOMY     ? CT PARTIAL HIP REPLACEMENT Left 1/13/2020    Procedure: HEMIARTHROPLASTY, HIP, UNIPOLAR;  Surgeon: Dav Steward MD;  Location: [facility];  Service: Orthopedics       Family History:   No family history on file.    Social History:    Social History     Socioeconomic History   ? Marital status:      Spouse name: Not on file   ? Number of children: Not on file   ? Years of education: Not on file   ? Highest education level: Not on file   Occupational History   ? Not on file   Social Needs   ? Financial resource strain: Not on file   ? Food insecurity:     Worry: Not on file     Inability: Not on file   ? Transportation needs:     Medical: Not on file     Non-medical: Not on file   Tobacco Use   ? Smoking status: Former Smoker   ? Smokeless tobacco: Former User   Substance and Sexual Activity   ? Alcohol use: Not Currently     Frequency: Never     Binge frequency: Never   ? Drug use: Not Currently   ? Sexual activity: Not Currently   Lifestyle   ? Physical activity:     Days per week: Not on file     Minutes per session: Not on file   ? Stress: Not on file   Relationships   ? Social connections:     Talks on phone: Not on file     Gets together: Not on file     Attends Anabaptism service: Not on file     Active member of club or organization: Not on file     Attends meetings of clubs or organizations: Not on file      "Relationship status: Not on file   ? Intimate partner violence:     Fear of current or ex partner: Not on file     Emotionally abused: Not on file     Physically abused: Not on file     Forced sexual activity: Not on file   Other Topics Concern   ? Not on file   Social History Narrative   ? Not on file          Review of Systems   Constitutional: Positive for activity change. Negative for appetite change, chills, diaphoresis and fatigue.        No acute issues   HENT: Positive for hearing loss. Negative for congestion.    Eyes: Negative.    Respiratory: Negative for shortness of breath and wheezing.    Cardiovascular: Positive for leg swelling.   Gastrointestinal: Negative for abdominal distention, abdominal pain, blood in stool, constipation and diarrhea.   Endocrine: Negative.    Genitourinary: Negative for difficulty urinating.   Musculoskeletal:        Denies pain   Skin: Positive for wound.        L hip incision   Allergic/Immunologic: Negative.    Neurological: Negative for dizziness, tremors, speech difficulty and light-headedness.   Hematological: Negative.    Psychiatric/Behavioral: Positive for confusion. Negative for agitation and hallucinations. The patient is not nervous/anxious.        Vitals:    02/10/20 0900   BP: 148/82   Pulse: 68   Resp: 20   Temp: 97  F (36.1  C)   SpO2: 95%   Weight: 167 lb (75.8 kg)   Height: 5' 6\" (1.676 m)       Physical Exam  Constitutional:       Comments: No acute issues   HENT:      Head: Normocephalic and atraumatic.      Right Ear: External ear normal.      Left Ear: External ear normal.      Mouth/Throat:      Pharynx: No oropharyngeal exudate or posterior oropharyngeal erythema.   Eyes:      General:         Right eye: No discharge.         Left eye: No discharge.   Neck:      Musculoskeletal: Normal range of motion and neck supple.   Cardiovascular:      Rate and Rhythm: Normal rate.      Heart sounds: No murmur. No friction rub. No gallop.       Comments: Hx of " A-fib  Pulmonary:      Effort: No respiratory distress.      Breath sounds: No wheezing or rales.      Comments: Dim, MORE, RA  Abdominal:      General: There is no distension.      Palpations: Abdomen is soft.      Tenderness: There is no abdominal tenderness. There is no guarding.      Comments: Denies constipation or diarrhea   Genitourinary:     Comments: incontinent  Musculoskeletal:      Right lower leg: Edema present.      Left lower leg: Edema present.      Comments: 1+BLE, wc bound, denies pain   Skin:     General: Skin is warm and dry.      Comments: L hip incision intact   Neurological:      Mental Status: She is alert. Mental status is at baseline. She is disoriented.      Comments: A/O x1   Psychiatric:         Mood and Affect: Mood normal.         Medication List:  Current Outpatient Medications   Medication Sig   ? acetaminophen (TYLENOL) 500 MG tablet Take 2 tablets (1,000 mg total) by mouth 3 (three) times a day.   ? albuterol (PROVENTIL) 2.5 mg /3 mL (0.083 %) nebulizer solution Take 3 mL (2.5 mg total) by nebulization 3 (three) times a day as needed for wheezing.   ? albuterol (PROVENTIL) 2.5 mg /3 mL (0.083 %) nebulizer solution Take 3 mL (2.5 mg total) by nebulization 3 (three) times a day.   ? apixaban ANTICOAGULANT (ELIQUIS) 5 mg Tab tablet Take 1 tablet (5 mg total) by mouth 2 (two) times a day.   ? artificial tear,dxtrn-hpm-gly, (GENTEAL TEARS MODERATE) 0.1-0.3-0.2 % Drop ophthalmic drops Administer 1 drop to the right eye 2 (two) times a day.   ? calcium, as carbonate, (OS-MAGUI) 500 mg calcium (1,250 mg) tablet Take 1 tablet by mouth daily.   ? cholecalciferol, vitamin D3, 5,000 unit Tab Take 1 tablet by mouth daily.   ? citalopram (CELEXA) 20 MG tablet Take 1 tablet (20 mg total) by mouth daily.   ? cyanocobalamin 1000 MCG tablet Take 1 tablet (1,000 mcg total) by mouth daily.   ? ferrous sulfate 325 (65 FE) MG tablet Take 1 tablet by mouth 2 (two) times a day.    ? furosemide (LASIX) 20 MG  tablet Take 1 tablet (20 mg total) by mouth daily.   ? melatonin 10 mg Tab Take 10 mg by mouth at bedtime.   ? metoprolol succinate (TOPROL-XL) 50 MG 24 hr tablet Take 1 tablet (50 mg total) by mouth daily. (Patient taking differently: Take 12.5 mg by mouth daily. )   ? polyethylene glycol (MIRALAX) 17 gram packet Take 1 packet (17 g total) by mouth daily.   ? predniSONE (DELTASONE) 2.5 MG tablet Take 2.5 mg by mouth daily.   ? senna-docusate (SENNOSIDES-DOCUSATE SODIUM) 8.6-50 mg tablet Take 1 tablet by mouth 2 (two) times a day as needed for constipation.   ? umeclidinium-vilanterol (ANORO ELLIPTA) 62.5-25 mcg/actuation inhaler Inhale 1 puff daily.       Labs:  Results for orders placed or performed in visit on 01/20/20   Basic Metabolic Panel   Result Value Ref Range    Sodium 143 136 - 145 mmol/L    Potassium 3.8 3.5 - 5.0 mmol/L    Chloride 105 98 - 107 mmol/L    CO2 32 (H) 22 - 31 mmol/L    Anion Gap, Calculation 6 5 - 18 mmol/L    Glucose 83 70 - 125 mg/dL    Calcium 8.4 (L) 8.5 - 10.5 mg/dL    BUN 17 8 - 28 mg/dL    Creatinine 0.65 0.60 - 1.10 mg/dL    GFR MDRD Af Amer >60 >60 mL/min/1.73m2    GFR MDRD Non Af Amer >60 >60 mL/min/1.73m2     Lab Results   Component Value Date    WBC 11.4 (H) 02/04/2020    HGB 11.0 (L) 02/04/2020    HCT 36.3 02/04/2020     (H) 02/04/2020     02/04/2020     Lab Results   Component Value Date    TSH 2.39 10/16/2019     Vitamin D, Total (25-Hydroxy)   Date Value Ref Range Status   01/20/2020 28.7 (L) 30.0 - 80.0 ng/mL Final       Lab Results   Component Value Date    [id number] 1,056 (H) 10/16/2019       Assessment/Plan:    Commuted left femoral neck fracture status post left hip hemiarthroplasty on 1/13/2020: Given weightbearing as tolerated, increased supplementation, f/u in 4-6 wks    DVT prophylaxis with history of A. Fib: Continue Eliquis 5 mg twice daily    Pain control: Continue Tylenol 1000 mg 3 times daily, dc'd tramadol, denies pain    ABLA: initial Hgb  12.6, last 11.0 on 2/4/20, decrease FeSO4 325mg to daily. Folate WNLs.  Recheck CBC in 1 wk    Chronic CLL with leukocytosis and aspiration pneumonitis on chronic steroids: Continue prednisone 2.5 mg daily, last WBC 11.4.  Continue anoro.  Weaned off O2, continue albuterol nebs three times a day    History of aspiration: Speech evaluated, no change in diet    Vitamin D deficiency: continue D3 5000 units daily    Vitamin B 12 deficiency: Continue cyanocobalamin 1000mcg daily    Calcium supplement: increased to 1250mg daily    HTN: continue metoprolol succinate 12.5mg daily, SBP <130    HFpEF: stable per hospital report, continue lasix 20mg daily, last 167lb, stable    Depression: Continue citalopram 20 mg daily     Insomnia: Continue melatonin 10 mg at bedtime. DC'd trazodone prior    Constipation: Continue MiraLAX daily and senna S 1 tab two times a day PRN, nothing used    Disposition: Lives in memory care. [facility] 8/30        Electronically signed by: Sekou Singletary NP

## 2021-06-28 NOTE — PROGRESS NOTES
Progress Notes by Sekou Singletary NP at 2/11/2020  4:47 PM     Author: Sekou Singletary NP Service: -- Author Type: Nurse Practitioner    Filed: 2/11/2020  8:47 PM Encounter Date: 2/11/2020 Status: Attested    : Sekou Singletary NP (Nurse Practitioner) Cosigner: Qing Leon MBBS at 2/12/2020  1:28 PM    Attestation signed by Qing Leon MBBS at 2/12/2020  1:28 PM    Agree with dc note                [facility]      Facility:    [facility] [[id number]]  Code Status: DNR      Chief Complaint/Reason for Visit:  101  Chief Complaint   Patient presents with   ? Discharge Summary       HPI:   Cha is a 86 y.o. female who is a recent transfer from [facility] with an admission on 1/12/20 and discharge on 1/16/20.  She has a past medical history of dementia, A. 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  She underwent repair, given weightbearing as tolerated, Tylenol and tramadol for pain control.  Also developed ABLA with an initial hemoglobin at 12.6, at discharge 8.2.  Scheduled to follow-up with Ortho in 2 to 3 weeks.  Her CLL has chronic leukocytosis though may be contributing with steroid use.  She also had acute respiratory failure with hypoxia this history of aspiration pneumonitis, needed supplemental oxygen, has weaned down to 2 L.  Admission chest x-ray showed right upper lobe atelectasis, left upper lobe atelectasis likely due to chronic pneumonitis.  Per family was intermittently treated with antibiotics and steroid burst.  Recently completed course of Augmentin, her procalcitonin was negative no infectious symptoms noted.  We will continue to wean oxygen.  Per AChely fib,  family was consulted on ongoing anticoagulation though has deemed to continue, she is in normal sinus rhythm currently and she has normal EF.  She was then discharged to TCU for rehab.    She has concluded her TCU stay and will be discharged to [facility] with services on 2/13/20.    Past Medical History:  Past Medical History:   Diagnosis Date   ? A-fib (H)    ? Breast cancer (H)    ? CLL (chronic lymphocytic leukemia) (H)    ? COPD (chronic obstructive pulmonary disease) (H)    ? Dementia (H)    ? DVT (deep venous thrombosis) (H)    ? Hypertension    ? Lung cancer (H)            Surgical History:  Past Surgical History:   Procedure Laterality Date   ? BREAST BIOPSY     ? HERNIA REPAIR     ? HYSTERECTOMY     ? MASTECTOMY     ? IN PARTIAL HIP REPLACEMENT Left 1/13/2020    Procedure: HEMIARTHROPLASTY, HIP, UNIPOLAR;  Surgeon: Dav Steward MD;  [place] [facility];  Service: Orthopedics       Family History:   No family history on file.    Social History:    Social History     Socioeconomic History   ? Marital status:      Spouse name: Not on file   ? Number of children: Not on file   ? Years of education: Not on file   ? Highest education level: Not on file   Occupational History   ? Not on file   Social Needs   ? Financial resource strain: Not on file   ? Food insecurity:     Worry: Not on file     Inability: Not on file   ? Transportation needs:     Medical: Not on file     Non-medical: Not on file   Tobacco Use   ? Smoking status: Former Smoker   ? Smokeless tobacco: Former User   Substance and Sexual Activity   ? Alcohol use: Not Currently     Frequency: Never     Binge frequency: Never   ? Drug use: Not Currently   ? Sexual activity: Not Currently   Lifestyle   ? Physical activity:     Days per week: Not on file     Minutes per session: Not on file   ? Stress: Not on file   Relationships   ? Social connections:     Talks on phone: Not on file     Gets together:  Not on file     Attends Rastafarian service: Not on file     Active member of club or organization: Not on file     Attends meetings of clubs or organizations: Not on file     Relationship status: Not on file   ? Intimate partner violence:     Fear of current or ex partner: Not on file     Emotionally abused: Not on file     Physically abused: Not on file     Forced sexual activity: Not on file   Other Topics Concern   ? Not on file   Social History Narrative   ? Not on file          Review of Systems   Constitutional: Positive for activity change. Negative for appetite change, chills, diaphoresis and fatigue.        No acute issues   HENT: Positive for hearing loss. Negative for congestion.    Eyes: Negative.    Respiratory: Negative for shortness of breath and wheezing.    Cardiovascular: Positive for leg swelling.   Gastrointestinal: Negative for abdominal distention, abdominal pain, blood in stool, constipation and diarrhea.   Endocrine: Negative.    Genitourinary: Negative for difficulty urinating.   Musculoskeletal:        Denies pain   Skin: Negative for wound.        Hx of L hip incision   Allergic/Immunologic: Negative.    Neurological: Negative for dizziness, tremors, speech difficulty and light-headedness.   Hematological: Negative.    Psychiatric/Behavioral: Positive for confusion. Negative for agitation and hallucinations. The patient is not nervous/anxious.        Vitals:    02/11/20 1647   BP: 138/72   Pulse: 70   Resp: 16   Temp: 97  F (36.1  C)   SpO2: 93%   Weight: 167 lb (75.8 kg)       Physical Exam  Constitutional:       Comments: No acute issues   HENT:      Head: Normocephalic and atraumatic.      Right Ear: External ear normal.      Left Ear: External ear normal.      Mouth/Throat:      Pharynx: No oropharyngeal exudate or posterior oropharyngeal erythema.   Eyes:      General:         Right eye: No discharge.         Left eye: No discharge.   Neck:      Musculoskeletal: Normal range of motion  and neck supple.   Cardiovascular:      Rate and Rhythm: Normal rate.      Heart sounds: No murmur. No friction rub. No gallop.       Comments: Hx of A-fib  Pulmonary:      Effort: No respiratory distress.      Breath sounds: No wheezing or rales.      Comments: Dim, MORE, RA  Abdominal:      General: There is no distension.      Palpations: Abdomen is soft.      Tenderness: There is no abdominal tenderness. There is no guarding.      Comments: Denies constipation or diarrhea   Genitourinary:     Comments: incontinent  Musculoskeletal:      Right lower leg: Edema present.      Left lower leg: Edema present.      Comments: 1+BLE, wc bound, denies pain   Skin:     General: Skin is warm and dry.      Comments: L hip incision intact   Neurological:      Mental Status: She is alert. Mental status is at baseline. She is disoriented.      Comments: A/O x1   Psychiatric:         Mood and Affect: Mood normal.         Medication List:  Current Outpatient Medications   Medication Sig   ? acetaminophen (TYLENOL) 500 MG tablet Take 2 tablets (1,000 mg total) by mouth 3 (three) times a day.   ? albuterol (PROVENTIL) 2.5 mg /3 mL (0.083 %) nebulizer solution Take 3 mL (2.5 mg total) by nebulization 3 (three) times a day as needed for wheezing.   ? albuterol (PROVENTIL) 2.5 mg /3 mL (0.083 %) nebulizer solution Take 3 mL (2.5 mg total) by nebulization 3 (three) times a day.   ? apixaban ANTICOAGULANT (ELIQUIS) 5 mg Tab tablet Take 1 tablet (5 mg total) by mouth 2 (two) times a day.   ? artificial tear,dxtrn-hpm-gly, (GENTEAL TEARS MODERATE) 0.1-0.3-0.2 % Drop ophthalmic drops Administer 1 drop to the right eye 2 (two) times a day.   ? calcium, as carbonate, (OS-MAGUI) 500 mg calcium (1,250 mg) tablet Take 1 tablet by mouth daily.   ? cholecalciferol, vitamin D3, 5,000 unit Tab Take 1 tablet by mouth daily.   ? citalopram (CELEXA) 20 MG tablet Take 1 tablet (20 mg total) by mouth daily.   ? cyanocobalamin 1000 MCG tablet Take 1 tablet  (1,000 mcg total) by mouth daily.   ? ferrous sulfate 325 (65 FE) MG tablet Take 1 tablet by mouth daily with breakfast.    ? furosemide (LASIX) 20 MG tablet Take 1 tablet (20 mg total) by mouth daily.   ? melatonin 10 mg Tab Take 10 mg by mouth at bedtime.   ? metoprolol succinate (TOPROL-XL) 50 MG 24 hr tablet Take 1 tablet (50 mg total) by mouth daily. (Patient taking differently: Take 12.5 mg by mouth daily. )   ? polyethylene glycol (MIRALAX) 17 gram packet Take 1 packet (17 g total) by mouth daily.   ? predniSONE (DELTASONE) 2.5 MG tablet Take 2.5 mg by mouth daily.   ? senna-docusate (SENNOSIDES-DOCUSATE SODIUM) 8.6-50 mg tablet Take 1 tablet by mouth 2 (two) times a day as needed for constipation.   ? umeclidinium-vilanterol (ANORO ELLIPTA) 62.5-25 mcg/actuation inhaler Inhale 1 puff daily.       Labs:  Results for orders placed or performed in visit on 01/20/20   Basic Metabolic Panel   Result Value Ref Range    Sodium 143 136 - 145 mmol/L    Potassium 3.8 3.5 - 5.0 mmol/L    Chloride 105 98 - 107 mmol/L    CO2 32 (H) 22 - 31 mmol/L    Anion Gap, Calculation 6 5 - 18 mmol/L    Glucose 83 70 - 125 mg/dL    Calcium 8.4 (L) 8.5 - 10.5 mg/dL    BUN 17 8 - 28 mg/dL    Creatinine 0.65 0.60 - 1.10 mg/dL    GFR MDRD Af Amer >60 >60 mL/min/1.73m2    GFR MDRD Non Af Amer >60 >60 mL/min/1.73m2     Lab Results   Component Value Date    WBC 11.4 (H) 02/04/2020    HGB 11.0 (L) 02/04/2020    HCT 36.3 02/04/2020     (H) 02/04/2020     02/04/2020     Lab Results   Component Value Date    TSH 2.39 10/16/2019     Vitamin D, Total (25-Hydroxy)   Date Value Ref Range Status   01/20/2020 28.7 (L) 30.0 - 80.0 ng/mL Final       Lab Results   Component Value Date    [id number] 1,056 (H) 10/16/2019       Assessment/Plan:    Commuted left femoral neck fracture status post left hip hemiarthroplasty on 1/13/2020: Given weightbearing as tolerated, increased supplementation, f/u with ortho in several weeks    DVT  prophylaxis with history of A. Fib: Continue Eliquis 5 mg twice daily    Dysphagia and cognition: speech following    Pain control: Continue Tylenol 1000 mg 3 times daily, dc'd tramadol, denies pain    ABLA: initial Hgb 12.6, last 11.0 on 2/4/20, decrease FeSO4 325mg to daily. Folate WNLs.  Recheck CBC with PCP    Chronic CLL with leukocytosis and aspiration pneumonitis on chronic steroids: Continue prednisone 2.5 mg daily, last WBC 11.4.  Continue anoro.  Weaned off O2, continue albuterol nebs three times a day    History of aspiration: Speech evaluated, no change in diet    Vitamin D deficiency: continue D3 5000 units daily    Vitamin B 12 deficiency: Continue cyanocobalamin 1000mcg daily    Calcium supplement: increased to 1250mg daily    HTN: continue metoprolol succinate 12.5mg daily, SBP <130    HFpEF: stable per hospital report, continue lasix 20mg daily, last 167lb, stable    Depression: Continue citalopram 20 mg daily     Insomnia: Continue melatonin 10 mg at bedtime. DC'd trazodone prior    Constipation: Continue MiraLAX daily and senna S 1 tab two times a day PRN, nothing used    Disposition: Will be returning to memory care with services on 2/13/20. [facility] 8/30      MEDICAL EQUIPMENT NEEDS:  na    DISCHARGE PLAN/FACE TO FACE:  I certify that services are/were furnished while this patient was under the care of a physician and that a physician or an allowed non-physician practitioner (NPP), had a face-to-face encounter that meets the physician face-to-face encounter requirements. The encounter was in whole, or in part, related to the primary reason for home health. The patient is confined to his/her home and needs intermittent skilled nursing, physical therapy, speech-language pathology, or the continued need for occupational therapy. A plan of care has been established by a physician and is periodically reviewed by a physician.  Date of Face-to-Face Encounter: 2/11/20    I certify that, based on my  findings, the following services are medically necessary home health services: [facility] ST and OT/OT to evaluate and treat.    My clinical findings support the need for the above skilled services because: patient will be discharging to [facility].  Patient needs ongoing speech care assistance and performing IADLs and ADLs effectively and safely.    Patient to re-establish plan of care with their PCP within 7 days after leaving TCU.     The care plan has been reviewed and all orders signed. Changes to care plan, if any, as noted. Otherwise, continue care plan of care.  The total time spent with this patient was 31 minutes, with greater than 50% spent in counseling and coordination of care that included multiple issues per ongoing nebs, pain control and continuing therapy, which lasted 16 minutes.      Electronically signed by: Sekou Singletary NP

## 2022-01-01 ENCOUNTER — LAB REQUISITION (OUTPATIENT)
Dept: LAB | Facility: CLINIC | Age: 87
End: 2022-01-01
Payer: MEDICARE

## 2022-01-01 ENCOUNTER — TELEPHONE (OUTPATIENT)
Dept: FAMILY MEDICINE | Facility: CLINIC | Age: 87
End: 2022-01-01

## 2022-01-01 ENCOUNTER — APPOINTMENT (OUTPATIENT)
Dept: PHYSICAL THERAPY | Facility: HOSPITAL | Age: 87
DRG: 177 | End: 2022-01-01
Payer: MEDICARE

## 2022-01-01 ENCOUNTER — MEDICAL CORRESPONDENCE (OUTPATIENT)
Dept: HEALTH INFORMATION MANAGEMENT | Facility: CLINIC | Age: 87
End: 2022-01-01

## 2022-01-01 ENCOUNTER — HOSPITAL ENCOUNTER (INPATIENT)
Facility: HOSPITAL | Age: 87
LOS: 4 days | Discharge: HOME HEALTH | DRG: 177 | End: 2022-07-09
Attending: EMERGENCY MEDICINE | Admitting: FAMILY MEDICINE
Payer: MEDICARE

## 2022-01-01 ENCOUNTER — APPOINTMENT (OUTPATIENT)
Dept: RADIOLOGY | Facility: HOSPITAL | Age: 87
DRG: 177 | End: 2022-01-01
Attending: EMERGENCY MEDICINE
Payer: MEDICARE

## 2022-01-01 ENCOUNTER — PATIENT OUTREACH (OUTPATIENT)
Dept: CARE COORDINATION | Facility: CLINIC | Age: 87
End: 2022-01-01

## 2022-01-01 VITALS
HEIGHT: 66 IN | SYSTOLIC BLOOD PRESSURE: 140 MMHG | TEMPERATURE: 98.4 F | BODY MASS INDEX: 29.73 KG/M2 | DIASTOLIC BLOOD PRESSURE: 60 MMHG | HEART RATE: 76 BPM | WEIGHT: 185 LBS | OXYGEN SATURATION: 90 % | RESPIRATION RATE: 18 BRPM

## 2022-01-01 DIAGNOSIS — J44.1 COPD EXACERBATION (H): ICD-10-CM

## 2022-01-01 DIAGNOSIS — J18.9 PNEUMONIA DUE TO INFECTIOUS ORGANISM, UNSPECIFIED LATERALITY, UNSPECIFIED PART OF LUNG: ICD-10-CM

## 2022-01-01 DIAGNOSIS — A41.9 SEPSIS, DUE TO UNSPECIFIED ORGANISM, UNSPECIFIED WHETHER ACUTE ORGAN DYSFUNCTION PRESENT (H): ICD-10-CM

## 2022-01-01 DIAGNOSIS — J44.0 COPD (CHRONIC OBSTRUCTIVE PULMONARY DISEASE) WITH ACUTE BRONCHITIS (H): Primary | ICD-10-CM

## 2022-01-01 DIAGNOSIS — J96.01 ACUTE RESPIRATORY FAILURE WITH HYPOXIA (H): ICD-10-CM

## 2022-01-01 DIAGNOSIS — J20.9 COPD (CHRONIC OBSTRUCTIVE PULMONARY DISEASE) WITH ACUTE BRONCHITIS (H): Primary | ICD-10-CM

## 2022-01-01 DIAGNOSIS — R41.0 DELIRIUM: ICD-10-CM

## 2022-01-01 DIAGNOSIS — U07.1 INFECTION DUE TO 2019 NOVEL CORONAVIRUS: ICD-10-CM

## 2022-01-01 DIAGNOSIS — Z71.89 OTHER SPECIFIED COUNSELING: ICD-10-CM

## 2022-01-01 DIAGNOSIS — E87.20 LACTIC ACIDOSIS: ICD-10-CM

## 2022-01-01 DIAGNOSIS — R30.0 DYSURIA: ICD-10-CM

## 2022-01-01 LAB
ALBUMIN SERPL-MCNC: 3.6 G/DL (ref 3.5–5)
ALBUMIN UR-MCNC: NEGATIVE MG/DL
ALBUMIN UR-MCNC: NEGATIVE MG/DL
ALP SERPL-CCNC: 69 U/L (ref 45–120)
ALT SERPL W P-5'-P-CCNC: 14 U/L (ref 0–45)
ANION GAP SERPL CALCULATED.3IONS-SCNC: 10 MMOL/L (ref 5–18)
ANION GAP SERPL CALCULATED.3IONS-SCNC: 11 MMOL/L (ref 5–18)
ANION GAP SERPL CALCULATED.3IONS-SCNC: 5 MMOL/L (ref 5–18)
APPEARANCE UR: CLEAR
APPEARANCE UR: CLEAR
AST SERPL W P-5'-P-CCNC: 12 U/L (ref 0–40)
ATRIAL RATE - MUSE: 118 BPM
ATRIAL RATE - MUSE: 95 BPM
BACTERIA BLD CULT: NO GROWTH
BACTERIA BLD CULT: NO GROWTH
BACTERIA UR CULT: NORMAL
BASE EXCESS BLDA CALC-SCNC: 6.1 MMOL/L
BASOPHILS # BLD MANUAL: 0 10E3/UL (ref 0–0.2)
BASOPHILS NFR BLD MANUAL: 0 %
BILIRUB SERPL-MCNC: 0.3 MG/DL (ref 0–1)
BILIRUB UR QL STRIP: NEGATIVE
BILIRUB UR QL STRIP: NEGATIVE
BNP SERPL-MCNC: 122 PG/ML (ref 0–167)
BNP SERPL-MCNC: 201 PG/ML (ref 0–167)
BUN SERPL-MCNC: 21 MG/DL (ref 8–28)
BUN SERPL-MCNC: 24 MG/DL (ref 8–28)
BUN SERPL-MCNC: 29 MG/DL (ref 8–28)
BUN SERPL-MCNC: 29 MG/DL (ref 8–28)
BUN SERPL-MCNC: 31 MG/DL (ref 8–28)
C REACTIVE PROTEIN LHE: 0.7 MG/DL (ref 0–?)
C REACTIVE PROTEIN LHE: 0.8 MG/DL (ref 0–?)
C REACTIVE PROTEIN LHE: 0.9 MG/DL (ref 0–?)
C REACTIVE PROTEIN LHE: 1 MG/DL (ref 0–?)
C REACTIVE PROTEIN LHE: 1 MG/DL (ref 0–?)
CALCIUM SERPL-MCNC: 8.6 MG/DL (ref 8.5–10.5)
CALCIUM SERPL-MCNC: 8.8 MG/DL (ref 8.5–10.5)
CALCIUM SERPL-MCNC: 9.7 MG/DL (ref 8.5–10.5)
CHLORIDE BLD-SCNC: 103 MMOL/L (ref 98–107)
CHLORIDE BLD-SCNC: 103 MMOL/L (ref 98–107)
CHLORIDE BLD-SCNC: 104 MMOL/L (ref 98–107)
CHLORIDE BLD-SCNC: 105 MMOL/L (ref 98–107)
CHLORIDE BLD-SCNC: 105 MMOL/L (ref 98–107)
CO2 SERPL-SCNC: 25 MMOL/L (ref 22–31)
CO2 SERPL-SCNC: 26 MMOL/L (ref 22–31)
CO2 SERPL-SCNC: 26 MMOL/L (ref 22–31)
CO2 SERPL-SCNC: 27 MMOL/L (ref 22–31)
CO2 SERPL-SCNC: 31 MMOL/L (ref 22–31)
COHGB MFR BLD: 85.5 % (ref 95–96)
COLOR UR AUTO: ABNORMAL
COLOR UR AUTO: ABNORMAL
CREAT SERPL-MCNC: 0.88 MG/DL (ref 0.6–1.1)
CREAT SERPL-MCNC: 0.95 MG/DL (ref 0.6–1.1)
CREAT SERPL-MCNC: 0.99 MG/DL (ref 0.6–1.1)
CREAT SERPL-MCNC: 1.04 MG/DL (ref 0.6–1.1)
CREAT SERPL-MCNC: 1.17 MG/DL (ref 0.6–1.1)
D DIMER PPP FEU-MCNC: 0.39 UG/ML FEU (ref 0–0.5)
D DIMER PPP FEU-MCNC: 0.47 UG/ML FEU (ref 0–0.5)
D DIMER PPP FEU-MCNC: 0.61 UG/ML FEU (ref 0–0.5)
D DIMER PPP FEU-MCNC: 0.63 UG/ML FEU (ref 0–0.5)
D DIMER PPP FEU-MCNC: 0.64 UG/ML FEU (ref 0–0.5)
D DIMER PPP FEU-MCNC: 0.73 UG/ML FEU (ref 0–0.5)
DIASTOLIC BLOOD PRESSURE - MUSE: NORMAL MMHG
DIASTOLIC BLOOD PRESSURE - MUSE: NORMAL MMHG
EOSINOPHIL # BLD MANUAL: 0 10E3/UL (ref 0–0.7)
EOSINOPHIL NFR BLD MANUAL: 0 %
ERYTHROCYTE [DISTWIDTH] IN BLOOD BY AUTOMATED COUNT: 13.4 % (ref 10–15)
ERYTHROCYTE [DISTWIDTH] IN BLOOD BY AUTOMATED COUNT: 13.7 % (ref 10–15)
ERYTHROCYTE [DISTWIDTH] IN BLOOD BY AUTOMATED COUNT: 13.8 % (ref 10–15)
ERYTHROCYTE [DISTWIDTH] IN BLOOD BY AUTOMATED COUNT: 13.9 % (ref 10–15)
ERYTHROCYTE [DISTWIDTH] IN BLOOD BY AUTOMATED COUNT: 13.9 % (ref 10–15)
FLUAV RNA SPEC QL NAA+PROBE: NEGATIVE
FLUBV RNA RESP QL NAA+PROBE: NEGATIVE
GFR SERPL CREATININE-BSD FRML MDRD: 44 ML/MIN/1.73M2
GFR SERPL CREATININE-BSD FRML MDRD: 51 ML/MIN/1.73M2
GFR SERPL CREATININE-BSD FRML MDRD: 54 ML/MIN/1.73M2
GFR SERPL CREATININE-BSD FRML MDRD: 57 ML/MIN/1.73M2
GFR SERPL CREATININE-BSD FRML MDRD: 62 ML/MIN/1.73M2
GLUCOSE BLD-MCNC: 105 MG/DL (ref 70–125)
GLUCOSE BLD-MCNC: 115 MG/DL (ref 70–125)
GLUCOSE BLD-MCNC: 130 MG/DL (ref 70–125)
GLUCOSE BLD-MCNC: 138 MG/DL (ref 70–125)
GLUCOSE BLD-MCNC: 144 MG/DL (ref 70–125)
GLUCOSE UR STRIP-MCNC: NEGATIVE MG/DL
GLUCOSE UR STRIP-MCNC: NEGATIVE MG/DL
HCO3 BLD-SCNC: 29 MMOL/L (ref 23–29)
HCT VFR BLD AUTO: 35.8 % (ref 35–47)
HCT VFR BLD AUTO: 37.6 % (ref 35–47)
HCT VFR BLD AUTO: 38.1 % (ref 35–47)
HCT VFR BLD AUTO: 39.2 % (ref 35–47)
HCT VFR BLD AUTO: 39.7 % (ref 35–47)
HGB BLD-MCNC: 11.4 G/DL (ref 11.7–15.7)
HGB BLD-MCNC: 11.7 G/DL (ref 11.7–15.7)
HGB BLD-MCNC: 11.9 G/DL (ref 11.7–15.7)
HGB BLD-MCNC: 12.3 G/DL (ref 11.7–15.7)
HGB BLD-MCNC: 12.3 G/DL (ref 11.7–15.7)
HGB UR QL STRIP: NEGATIVE
HGB UR QL STRIP: NEGATIVE
HYALINE CASTS: 19 /LPF
HYALINE CASTS: 6 /LPF
INR PPP: 1.1 (ref 0.85–1.15)
INR PPP: 1.13 (ref 0.85–1.15)
INTERPRETATION ECG - MUSE: NORMAL
INTERPRETATION ECG - MUSE: NORMAL
KETONES UR STRIP-MCNC: NEGATIVE MG/DL
KETONES UR STRIP-MCNC: NEGATIVE MG/DL
LACTATE SERPL-SCNC: 2.4 MMOL/L (ref 0.7–2)
LACTATE SERPL-SCNC: 2.8 MMOL/L (ref 0.7–2)
LACTATE SERPL-SCNC: 3.9 MMOL/L (ref 0.7–2)
LEUKOCYTE ESTERASE UR QL STRIP: NEGATIVE
LEUKOCYTE ESTERASE UR QL STRIP: NEGATIVE
LYMPHOCYTES # BLD MANUAL: 15.6 10E3/UL (ref 0.8–5.3)
LYMPHOCYTES NFR BLD MANUAL: 56 %
MAGNESIUM SERPL-MCNC: 2.2 MG/DL (ref 1.8–2.6)
MCH RBC QN AUTO: 32 PG (ref 26.5–33)
MCH RBC QN AUTO: 32.3 PG (ref 26.5–33)
MCH RBC QN AUTO: 32.3 PG (ref 26.5–33)
MCH RBC QN AUTO: 32.4 PG (ref 26.5–33)
MCH RBC QN AUTO: 32.4 PG (ref 26.5–33)
MCHC RBC AUTO-ENTMCNC: 31 G/DL (ref 31.5–36.5)
MCHC RBC AUTO-ENTMCNC: 31.1 G/DL (ref 31.5–36.5)
MCHC RBC AUTO-ENTMCNC: 31.2 G/DL (ref 31.5–36.5)
MCHC RBC AUTO-ENTMCNC: 31.4 G/DL (ref 31.5–36.5)
MCHC RBC AUTO-ENTMCNC: 31.8 G/DL (ref 31.5–36.5)
MCV RBC AUTO: 102 FL (ref 78–100)
MCV RBC AUTO: 102 FL (ref 78–100)
MCV RBC AUTO: 103 FL (ref 78–100)
MCV RBC AUTO: 104 FL (ref 78–100)
MCV RBC AUTO: 104 FL (ref 78–100)
MONOCYTES # BLD MANUAL: 1.1 10E3/UL (ref 0–1.3)
MONOCYTES NFR BLD MANUAL: 4 %
MUCOUS THREADS #/AREA URNS LPF: PRESENT /LPF
MYELOCYTES # BLD MANUAL: 0.6 10E3/UL
MYELOCYTES NFR BLD MANUAL: 2 %
NEUTROPHILS # BLD MANUAL: 10.6 10E3/UL (ref 1.6–8.3)
NEUTROPHILS NFR BLD MANUAL: 38 %
NITRATE UR QL: NEGATIVE
NITRATE UR QL: NEGATIVE
OXYHGB MFR BLD: 84.3 % (ref 95–96)
P AXIS - MUSE: 59 DEGREES
P AXIS - MUSE: NORMAL DEGREES
PCO2 BLD: 44 MM HG (ref 35–45)
PH BLD: 7.44 [PH] (ref 7.37–7.44)
PH UR STRIP: 5.5 [PH] (ref 5–7)
PH UR STRIP: 6 [PH] (ref 5–7)
PLAT MORPH BLD: ABNORMAL
PLATELET # BLD AUTO: 301 10E3/UL (ref 150–450)
PLATELET # BLD AUTO: 301 10E3/UL (ref 150–450)
PLATELET # BLD AUTO: 306 10E3/UL (ref 150–450)
PLATELET # BLD AUTO: 334 10E3/UL (ref 150–450)
PLATELET # BLD AUTO: 340 10E3/UL (ref 150–450)
PO2 BLD: 47 MM HG (ref 75–85)
POTASSIUM BLD-SCNC: 4.1 MMOL/L (ref 3.5–5)
POTASSIUM BLD-SCNC: 4.2 MMOL/L (ref 3.5–5)
POTASSIUM BLD-SCNC: 4.2 MMOL/L (ref 3.5–5)
POTASSIUM BLD-SCNC: 4.3 MMOL/L (ref 3.5–5)
POTASSIUM BLD-SCNC: 4.3 MMOL/L (ref 3.5–5)
PR INTERVAL - MUSE: 142 MS
PR INTERVAL - MUSE: 154 MS
PROCALCITONIN SERPL-MCNC: 0.05 NG/ML (ref 0–0.49)
PROT SERPL-MCNC: 6.2 G/DL (ref 6–8)
QRS DURATION - MUSE: 52 MS
QRS DURATION - MUSE: 70 MS
QT - MUSE: 326 MS
QT - MUSE: 384 MS
QTC - MUSE: 456 MS
QTC - MUSE: 482 MS
R AXIS - MUSE: -23 DEGREES
R AXIS - MUSE: -61 DEGREES
RBC # BLD AUTO: 3.52 10E6/UL (ref 3.8–5.2)
RBC # BLD AUTO: 3.61 10E6/UL (ref 3.8–5.2)
RBC # BLD AUTO: 3.72 10E6/UL (ref 3.8–5.2)
RBC # BLD AUTO: 3.81 10E6/UL (ref 3.8–5.2)
RBC # BLD AUTO: 3.81 10E6/UL (ref 3.8–5.2)
RBC MORPH BLD: ABNORMAL
RBC URINE: 1 /HPF
RBC URINE: 2 /HPF
SARS-COV-2 RNA RESP QL NAA+PROBE: POSITIVE
SODIUM SERPL-SCNC: 139 MMOL/L (ref 136–145)
SODIUM SERPL-SCNC: 140 MMOL/L (ref 136–145)
SODIUM SERPL-SCNC: 140 MMOL/L (ref 136–145)
SODIUM SERPL-SCNC: 141 MMOL/L (ref 136–145)
SODIUM SERPL-SCNC: 141 MMOL/L (ref 136–145)
SP GR UR STRIP: 1.01 (ref 1–1.03)
SP GR UR STRIP: 1.02 (ref 1–1.03)
SQUAMOUS EPITHELIAL: 2 /HPF
SQUAMOUS EPITHELIAL: 2 /HPF
STOMATOCYTES BLD QL SMEAR: SLIGHT
SYSTOLIC BLOOD PRESSURE - MUSE: NORMAL MMHG
SYSTOLIC BLOOD PRESSURE - MUSE: NORMAL MMHG
T AXIS - MUSE: -11 DEGREES
T AXIS - MUSE: 70 DEGREES
TEMPERATURE: 37 DEGREES C
TROPONIN I SERPL-MCNC: 0.01 NG/ML (ref 0–0.29)
UROBILINOGEN UR STRIP-MCNC: <2 MG/DL
UROBILINOGEN UR STRIP-MCNC: NORMAL MG/DL
VENTRICULAR RATE- MUSE: 118 BPM
VENTRICULAR RATE- MUSE: 95 BPM
WBC # BLD AUTO: 27.9 10E3/UL (ref 4–11)
WBC # BLD AUTO: 32.4 10E3/UL (ref 4–11)
WBC # BLD AUTO: 34.2 10E3/UL (ref 4–11)
WBC # BLD AUTO: 34.6 10E3/UL (ref 4–11)
WBC # BLD AUTO: 37.8 10E3/UL (ref 4–11)
WBC URINE: 1 /HPF
WBC URINE: <1 /HPF

## 2022-01-01 PROCEDURE — 250N000009 HC RX 250: Performed by: STUDENT IN AN ORGANIZED HEALTH CARE EDUCATION/TRAINING PROGRAM

## 2022-01-01 PROCEDURE — 97162 PT EVAL MOD COMPLEX 30 MIN: CPT | Mod: GP

## 2022-01-01 PROCEDURE — 250N000011 HC RX IP 250 OP 636: Performed by: STUDENT IN AN ORGANIZED HEALTH CARE EDUCATION/TRAINING PROGRAM

## 2022-01-01 PROCEDURE — 85379 FIBRIN DEGRADATION QUANT: CPT | Performed by: STUDENT IN AN ORGANIZED HEALTH CARE EDUCATION/TRAINING PROGRAM

## 2022-01-01 PROCEDURE — 80048 BASIC METABOLIC PNL TOTAL CA: CPT | Performed by: STUDENT IN AN ORGANIZED HEALTH CARE EDUCATION/TRAINING PROGRAM

## 2022-01-01 PROCEDURE — 250N000013 HC RX MED GY IP 250 OP 250 PS 637: Performed by: STUDENT IN AN ORGANIZED HEALTH CARE EDUCATION/TRAINING PROGRAM

## 2022-01-01 PROCEDURE — 99238 HOSP IP/OBS DSCHRG MGMT 30/<: CPT | Mod: GC

## 2022-01-01 PROCEDURE — 36415 COLL VENOUS BLD VENIPUNCTURE: CPT | Performed by: STUDENT IN AN ORGANIZED HEALTH CARE EDUCATION/TRAINING PROGRAM

## 2022-01-01 PROCEDURE — 86140 C-REACTIVE PROTEIN: CPT | Performed by: STUDENT IN AN ORGANIZED HEALTH CARE EDUCATION/TRAINING PROGRAM

## 2022-01-01 PROCEDURE — 258N000003 HC RX IP 258 OP 636: Performed by: STUDENT IN AN ORGANIZED HEALTH CARE EDUCATION/TRAINING PROGRAM

## 2022-01-01 PROCEDURE — 96365 THER/PROPH/DIAG IV INF INIT: CPT

## 2022-01-01 PROCEDURE — XW033E5 INTRODUCTION OF REMDESIVIR ANTI-INFECTIVE INTO PERIPHERAL VEIN, PERCUTANEOUS APPROACH, NEW TECHNOLOGY GROUP 5: ICD-10-PCS | Performed by: EMERGENCY MEDICINE

## 2022-01-01 PROCEDURE — 84145 PROCALCITONIN (PCT): CPT | Performed by: EMERGENCY MEDICINE

## 2022-01-01 PROCEDURE — 93010 ELECTROCARDIOGRAM REPORT: CPT | Mod: HIP | Performed by: INTERNAL MEDICINE

## 2022-01-01 PROCEDURE — 94640 AIRWAY INHALATION TREATMENT: CPT | Mod: 76

## 2022-01-01 PROCEDURE — 93005 ELECTROCARDIOGRAM TRACING: CPT | Performed by: EMERGENCY MEDICINE

## 2022-01-01 PROCEDURE — 250N000011 HC RX IP 250 OP 636

## 2022-01-01 PROCEDURE — 83880 ASSAY OF NATRIURETIC PEPTIDE: CPT | Performed by: EMERGENCY MEDICINE

## 2022-01-01 PROCEDURE — 82040 ASSAY OF SERUM ALBUMIN: CPT | Performed by: EMERGENCY MEDICINE

## 2022-01-01 PROCEDURE — 80053 COMPREHEN METABOLIC PANEL: CPT | Performed by: EMERGENCY MEDICINE

## 2022-01-01 PROCEDURE — 36600 WITHDRAWAL OF ARTERIAL BLOOD: CPT

## 2022-01-01 PROCEDURE — 999N000157 HC STATISTIC RCP TIME EA 10 MIN

## 2022-01-01 PROCEDURE — 85027 COMPLETE CBC AUTOMATED: CPT | Performed by: EMERGENCY MEDICINE

## 2022-01-01 PROCEDURE — 94640 AIRWAY INHALATION TREATMENT: CPT

## 2022-01-01 PROCEDURE — 85379 FIBRIN DEGRADATION QUANT: CPT | Performed by: EMERGENCY MEDICINE

## 2022-01-01 PROCEDURE — 97530 THERAPEUTIC ACTIVITIES: CPT | Mod: GP

## 2022-01-01 PROCEDURE — C9803 HOPD COVID-19 SPEC COLLECT: HCPCS

## 2022-01-01 PROCEDURE — 84484 ASSAY OF TROPONIN QUANT: CPT | Performed by: EMERGENCY MEDICINE

## 2022-01-01 PROCEDURE — 120N000001 HC R&B MED SURG/OB

## 2022-01-01 PROCEDURE — 99223 1ST HOSP IP/OBS HIGH 75: CPT | Mod: AI | Performed by: STUDENT IN AN ORGANIZED HEALTH CARE EDUCATION/TRAINING PROGRAM

## 2022-01-01 PROCEDURE — 81001 URINALYSIS AUTO W/SCOPE: CPT | Mod: ORL | Performed by: INTERNAL MEDICINE

## 2022-01-01 PROCEDURE — 93005 ELECTROCARDIOGRAM TRACING: CPT

## 2022-01-01 PROCEDURE — 99285 EMERGENCY DEPT VISIT HI MDM: CPT | Mod: CS,25

## 2022-01-01 PROCEDURE — 99233 SBSQ HOSP IP/OBS HIGH 50: CPT | Mod: GC | Performed by: STUDENT IN AN ORGANIZED HEALTH CARE EDUCATION/TRAINING PROGRAM

## 2022-01-01 PROCEDURE — 250N000011 HC RX IP 250 OP 636: Performed by: EMERGENCY MEDICINE

## 2022-01-01 PROCEDURE — 85610 PROTHROMBIN TIME: CPT | Performed by: STUDENT IN AN ORGANIZED HEALTH CARE EDUCATION/TRAINING PROGRAM

## 2022-01-01 PROCEDURE — 83880 ASSAY OF NATRIURETIC PEPTIDE: CPT

## 2022-01-01 PROCEDURE — 85027 COMPLETE CBC AUTOMATED: CPT | Performed by: STUDENT IN AN ORGANIZED HEALTH CARE EDUCATION/TRAINING PROGRAM

## 2022-01-01 PROCEDURE — 87040 BLOOD CULTURE FOR BACTERIA: CPT | Performed by: EMERGENCY MEDICINE

## 2022-01-01 PROCEDURE — 81001 URINALYSIS AUTO W/SCOPE: CPT | Performed by: EMERGENCY MEDICINE

## 2022-01-01 PROCEDURE — 83605 ASSAY OF LACTIC ACID: CPT | Performed by: EMERGENCY MEDICINE

## 2022-01-01 PROCEDURE — 99232 SBSQ HOSP IP/OBS MODERATE 35: CPT | Mod: GC | Performed by: STUDENT IN AN ORGANIZED HEALTH CARE EDUCATION/TRAINING PROGRAM

## 2022-01-01 PROCEDURE — 36415 COLL VENOUS BLD VENIPUNCTURE: CPT | Performed by: EMERGENCY MEDICINE

## 2022-01-01 PROCEDURE — 258N000003 HC RX IP 258 OP 636: Performed by: EMERGENCY MEDICINE

## 2022-01-01 PROCEDURE — 87636 SARSCOV2 & INF A&B AMP PRB: CPT | Performed by: EMERGENCY MEDICINE

## 2022-01-01 PROCEDURE — 83605 ASSAY OF LACTIC ACID: CPT | Performed by: STUDENT IN AN ORGANIZED HEALTH CARE EDUCATION/TRAINING PROGRAM

## 2022-01-01 PROCEDURE — 87086 URINE CULTURE/COLONY COUNT: CPT | Mod: ORL | Performed by: INTERNAL MEDICINE

## 2022-01-01 PROCEDURE — 96375 TX/PRO/DX INJ NEW DRUG ADDON: CPT

## 2022-01-01 PROCEDURE — 96368 THER/DIAG CONCURRENT INF: CPT

## 2022-01-01 PROCEDURE — 82805 BLOOD GASES W/O2 SATURATION: CPT | Performed by: EMERGENCY MEDICINE

## 2022-01-01 PROCEDURE — 97116 GAIT TRAINING THERAPY: CPT | Mod: GP

## 2022-01-01 PROCEDURE — 85610 PROTHROMBIN TIME: CPT | Performed by: EMERGENCY MEDICINE

## 2022-01-01 PROCEDURE — 71045 X-RAY EXAM CHEST 1 VIEW: CPT

## 2022-01-01 PROCEDURE — 96361 HYDRATE IV INFUSION ADD-ON: CPT

## 2022-01-01 PROCEDURE — 250N000009 HC RX 250: Performed by: EMERGENCY MEDICINE

## 2022-01-01 PROCEDURE — 85007 BL SMEAR W/DIFF WBC COUNT: CPT | Performed by: EMERGENCY MEDICINE

## 2022-01-01 PROCEDURE — 83735 ASSAY OF MAGNESIUM: CPT | Performed by: EMERGENCY MEDICINE

## 2022-01-01 RX ORDER — MINERAL OIL/HYDROPHIL PETROLAT
OINTMENT (GRAM) TOPICAL AT BEDTIME
COMMUNITY

## 2022-01-01 RX ORDER — ONDANSETRON 4 MG/1
4 TABLET, ORALLY DISINTEGRATING ORAL EVERY 6 HOURS PRN
Status: DISCONTINUED | OUTPATIENT
Start: 2022-01-01 | End: 2022-01-01 | Stop reason: HOSPADM

## 2022-01-01 RX ORDER — FERROUS SULFATE 325(65) MG
325 TABLET ORAL DAILY
Status: DISCONTINUED | OUTPATIENT
Start: 2022-01-01 | End: 2022-01-01 | Stop reason: HOSPADM

## 2022-01-01 RX ORDER — ACETAMINOPHEN 325 MG/1
650 TABLET ORAL EVERY 6 HOURS PRN
Status: DISCONTINUED | OUTPATIENT
Start: 2022-01-01 | End: 2022-01-01 | Stop reason: HOSPADM

## 2022-01-01 RX ORDER — PROCHLORPERAZINE 25 MG
12.5 SUPPOSITORY, RECTAL RECTAL EVERY 12 HOURS PRN
Status: DISCONTINUED | OUTPATIENT
Start: 2022-01-01 | End: 2022-01-01 | Stop reason: HOSPADM

## 2022-01-01 RX ORDER — DEXAMETHASONE SODIUM PHOSPHATE 10 MG/ML
6 INJECTION, SOLUTION INTRAMUSCULAR; INTRAVENOUS ONCE
Status: COMPLETED | OUTPATIENT
Start: 2022-01-01 | End: 2022-01-01

## 2022-01-01 RX ORDER — GUAIFENESIN AND DEXTROMETHORPHAN HYDROBROMIDE 600; 30 MG/1; MG/1
1 TABLET, EXTENDED RELEASE ORAL EVERY 12 HOURS
COMMUNITY

## 2022-01-01 RX ORDER — IPRATROPIUM BROMIDE AND ALBUTEROL SULFATE 2.5; .5 MG/3ML; MG/3ML
3 SOLUTION RESPIRATORY (INHALATION) ONCE
Status: COMPLETED | OUTPATIENT
Start: 2022-01-01 | End: 2022-01-01

## 2022-01-01 RX ORDER — HALOPERIDOL 5 MG/ML
2 INJECTION INTRAMUSCULAR ONCE
Status: COMPLETED | OUTPATIENT
Start: 2022-01-01 | End: 2022-01-01

## 2022-01-01 RX ORDER — IPRATROPIUM BROMIDE AND ALBUTEROL SULFATE 2.5; .5 MG/3ML; MG/3ML
1 SOLUTION RESPIRATORY (INHALATION) 4 TIMES DAILY
Status: DISCONTINUED | OUTPATIENT
Start: 2022-01-01 | End: 2022-01-01 | Stop reason: HOSPADM

## 2022-01-01 RX ORDER — NYSTATIN 100000 [USP'U]/G
POWDER TOPICAL 2 TIMES DAILY
COMMUNITY

## 2022-01-01 RX ORDER — DEXAMETHASONE SODIUM PHOSPHATE 10 MG/ML
6 INJECTION, SOLUTION INTRAMUSCULAR; INTRAVENOUS DAILY
Status: DISCONTINUED | OUTPATIENT
Start: 2022-01-01 | End: 2022-01-01

## 2022-01-01 RX ORDER — SODIUM CHLORIDE 9 MG/ML
INJECTION, SOLUTION INTRAVENOUS CONTINUOUS
Status: DISCONTINUED | OUTPATIENT
Start: 2022-01-01 | End: 2022-01-01

## 2022-01-01 RX ORDER — MIRTAZAPINE 15 MG/1
15 TABLET, FILM COATED ORAL AT BEDTIME
Status: DISCONTINUED | OUTPATIENT
Start: 2022-01-01 | End: 2022-01-01 | Stop reason: HOSPADM

## 2022-01-01 RX ORDER — PREDNISONE 10 MG/1
10 TABLET ORAL DAILY
Status: DISCONTINUED | OUTPATIENT
Start: 2022-01-01 | End: 2022-01-01

## 2022-01-01 RX ORDER — BISACODYL 10 MG
10 SUPPOSITORY, RECTAL RECTAL 2 TIMES DAILY PRN
COMMUNITY

## 2022-01-01 RX ORDER — CALCIUM CARBONATE/VITAMIN D3 600 MG-10
500 TABLET ORAL DAILY
Status: DISCONTINUED | OUTPATIENT
Start: 2022-01-01 | End: 2022-01-01 | Stop reason: HOSPADM

## 2022-01-01 RX ORDER — IBUPROFEN 200 MG
CAPSULE ORAL DAILY
COMMUNITY

## 2022-01-01 RX ORDER — GUAIFENESIN AND DEXTROMETHORPHAN HYDROBROMIDE 600; 30 MG/1; MG/1
1 TABLET, EXTENDED RELEASE ORAL EVERY 12 HOURS
Status: DISCONTINUED | OUTPATIENT
Start: 2022-01-01 | End: 2022-01-01 | Stop reason: HOSPADM

## 2022-01-01 RX ORDER — NYSTATIN 100000 [USP'U]/G
POWDER TOPICAL 2 TIMES DAILY
Status: DISCONTINUED | OUTPATIENT
Start: 2022-01-01 | End: 2022-01-01 | Stop reason: CLARIF

## 2022-01-01 RX ORDER — PREDNISONE 10 MG/1
10 TABLET ORAL DAILY
Status: ON HOLD | COMMUNITY
End: 2022-01-01

## 2022-01-01 RX ORDER — METOPROLOL SUCCINATE 25 MG/1
12.5 TABLET, EXTENDED RELEASE ORAL DAILY
COMMUNITY

## 2022-01-01 RX ORDER — ACETAMINOPHEN 325 MG/1
975 TABLET ORAL 3 TIMES DAILY
Status: DISCONTINUED | OUTPATIENT
Start: 2022-01-01 | End: 2022-01-01 | Stop reason: HOSPADM

## 2022-01-01 RX ORDER — METHYLPREDNISOLONE SODIUM SUCCINATE 125 MG/2ML
125 INJECTION, POWDER, LYOPHILIZED, FOR SOLUTION INTRAMUSCULAR; INTRAVENOUS ONCE
Status: COMPLETED | OUTPATIENT
Start: 2022-01-01 | End: 2022-01-01

## 2022-01-01 RX ORDER — LIDOCAINE 40 MG/G
CREAM TOPICAL
Status: DISCONTINUED | OUTPATIENT
Start: 2022-01-01 | End: 2022-01-01 | Stop reason: HOSPADM

## 2022-01-01 RX ORDER — HALOPERIDOL 5 MG/ML
INJECTION INTRAMUSCULAR
Status: COMPLETED
Start: 2022-01-01 | End: 2022-01-01

## 2022-01-01 RX ORDER — POLYETHYLENE GLYCOL 3350 17 G/17G
17 POWDER, FOR SOLUTION ORAL DAILY
Status: DISCONTINUED | OUTPATIENT
Start: 2022-01-01 | End: 2022-01-01 | Stop reason: HOSPADM

## 2022-01-01 RX ORDER — HALOPERIDOL 5 MG/ML
2 INJECTION INTRAMUSCULAR EVERY 6 HOURS PRN
Status: DISCONTINUED | OUTPATIENT
Start: 2022-01-01 | End: 2022-01-01 | Stop reason: HOSPADM

## 2022-01-01 RX ORDER — DEXAMETHASONE 6 MG/1
6 TABLET ORAL DAILY
Qty: 5 TABLET | Refills: 0 | Status: SHIPPED | OUTPATIENT
Start: 2022-01-01 | End: 2022-01-01

## 2022-01-01 RX ORDER — IBUPROFEN 200 MG
1 CAPSULE ORAL DAILY
COMMUNITY

## 2022-01-01 RX ORDER — SENNOSIDES 8.6 MG
1 TABLET ORAL 2 TIMES DAILY
Status: DISCONTINUED | OUTPATIENT
Start: 2022-01-01 | End: 2022-01-01 | Stop reason: HOSPADM

## 2022-01-01 RX ORDER — SENNOSIDES 8.6 MG
1 TABLET ORAL 2 TIMES DAILY
COMMUNITY

## 2022-01-01 RX ORDER — CEFTRIAXONE 1 G/1
1 INJECTION, POWDER, FOR SOLUTION INTRAMUSCULAR; INTRAVENOUS ONCE
Status: COMPLETED | OUTPATIENT
Start: 2022-01-01 | End: 2022-01-01

## 2022-01-01 RX ORDER — ACETAMINOPHEN 650 MG/1
650 SUPPOSITORY RECTAL EVERY 6 HOURS PRN
Status: DISCONTINUED | OUTPATIENT
Start: 2022-01-01 | End: 2022-01-01 | Stop reason: HOSPADM

## 2022-01-01 RX ORDER — BUMETANIDE 2 MG/1
2 TABLET ORAL DAILY
COMMUNITY

## 2022-01-01 RX ORDER — FERROUS SULFATE 325(65) MG
325 TABLET ORAL DAILY
COMMUNITY

## 2022-01-01 RX ORDER — SPIRONOLACTONE 25 MG/1
25 TABLET ORAL DAILY
COMMUNITY

## 2022-01-01 RX ORDER — IPRATROPIUM BROMIDE AND ALBUTEROL SULFATE 2.5; .5 MG/3ML; MG/3ML
1 SOLUTION RESPIRATORY (INHALATION) EVERY 4 HOURS PRN
Qty: 90 ML | Refills: 0 | Status: SHIPPED | OUTPATIENT
Start: 2022-01-01

## 2022-01-01 RX ORDER — SPIRONOLACTONE 25 MG/1
25 TABLET ORAL DAILY
Status: DISCONTINUED | OUTPATIENT
Start: 2022-01-01 | End: 2022-01-01 | Stop reason: HOSPADM

## 2022-01-01 RX ORDER — PREDNISONE 10 MG/1
10 TABLET ORAL DAILY
Start: 2022-01-01

## 2022-01-01 RX ORDER — SPIRONOLACTONE 25 MG/1
25 TABLET ORAL DAILY
Status: DISCONTINUED | OUTPATIENT
Start: 2022-01-01 | End: 2022-01-01

## 2022-01-01 RX ORDER — CEFTRIAXONE 2 G/1
2 INJECTION, POWDER, FOR SOLUTION INTRAMUSCULAR; INTRAVENOUS EVERY 24 HOURS
Status: DISCONTINUED | OUTPATIENT
Start: 2022-01-01 | End: 2022-01-01

## 2022-01-01 RX ORDER — IPRATROPIUM BROMIDE AND ALBUTEROL SULFATE 2.5; .5 MG/3ML; MG/3ML
1 SOLUTION RESPIRATORY (INHALATION) 4 TIMES DAILY
COMMUNITY
End: 2022-01-01

## 2022-01-01 RX ORDER — PROCHLORPERAZINE MALEATE 5 MG
5 TABLET ORAL EVERY 6 HOURS PRN
Status: DISCONTINUED | OUTPATIENT
Start: 2022-01-01 | End: 2022-01-01 | Stop reason: HOSPADM

## 2022-01-01 RX ORDER — DEXTRAN, HYPROMELLOSE 1; 3 MG/ML; MG/ML
1 SOLUTION/ DROPS OPHTHALMIC 2 TIMES DAILY
COMMUNITY

## 2022-01-01 RX ORDER — POLYETHYLENE GLYCOL 3350 17 G/17G
1 POWDER, FOR SOLUTION ORAL DAILY
COMMUNITY

## 2022-01-01 RX ORDER — ONDANSETRON 2 MG/ML
4 INJECTION INTRAMUSCULAR; INTRAVENOUS EVERY 6 HOURS PRN
Status: DISCONTINUED | OUTPATIENT
Start: 2022-01-01 | End: 2022-01-01 | Stop reason: HOSPADM

## 2022-01-01 RX ORDER — MIRTAZAPINE 15 MG/1
15 TABLET, FILM COATED ORAL AT BEDTIME
COMMUNITY

## 2022-01-01 RX ORDER — ALBUTEROL SULFATE 0.83 MG/ML
2.5 SOLUTION RESPIRATORY (INHALATION) 3 TIMES DAILY PRN
Status: DISCONTINUED | OUTPATIENT
Start: 2022-01-01 | End: 2022-01-01 | Stop reason: HOSPADM

## 2022-01-01 RX ORDER — CITALOPRAM HYDROBROMIDE 20 MG/1
20 TABLET ORAL DAILY
Status: DISCONTINUED | OUTPATIENT
Start: 2022-01-01 | End: 2022-01-01 | Stop reason: HOSPADM

## 2022-01-01 RX ORDER — BUMETANIDE 2 MG/1
2 TABLET ORAL DAILY
Status: DISCONTINUED | OUTPATIENT
Start: 2022-01-01 | End: 2022-01-01 | Stop reason: HOSPADM

## 2022-01-01 RX ADMIN — MICONAZOLE NITRATE: 2 POWDER TOPICAL at 09:16

## 2022-01-01 RX ADMIN — FERROUS SULFATE TAB 325 MG (65 MG ELEMENTAL FE) 325 MG: 325 (65 FE) TAB at 08:46

## 2022-01-01 RX ADMIN — GUAIFENESIN AND DEXTROMETHORPHAN HYDROBROMIDE 1 TABLET: 600; 30 TABLET, EXTENDED RELEASE ORAL at 13:51

## 2022-01-01 RX ADMIN — ANORECTAL OINTMENT: 15.7; .44; 24; 20.6 OINTMENT TOPICAL at 08:44

## 2022-01-01 RX ADMIN — IPRATROPIUM BROMIDE AND ALBUTEROL SULFATE 3 ML: 2.5; .5 SOLUTION RESPIRATORY (INHALATION) at 11:37

## 2022-01-01 RX ADMIN — UMECLIDINIUM BROMIDE AND VILANTEROL TRIFENATATE 1 PUFF: 62.5; 25 POWDER RESPIRATORY (INHALATION) at 08:44

## 2022-01-01 RX ADMIN — SODIUM CHLORIDE 50 ML: 9 INJECTION, SOLUTION INTRAVENOUS at 19:03

## 2022-01-01 RX ADMIN — ALBUTEROL SULFATE 2.5 MG: 2.5 SOLUTION RESPIRATORY (INHALATION) at 01:21

## 2022-01-01 RX ADMIN — IPRATROPIUM BROMIDE AND ALBUTEROL SULFATE 3 ML: 2.5; .5 SOLUTION RESPIRATORY (INHALATION) at 11:04

## 2022-01-01 RX ADMIN — METOPROLOL SUCCINATE 12.5 MG: 25 TABLET, EXTENDED RELEASE ORAL at 09:08

## 2022-01-01 RX ADMIN — MIRTAZAPINE 15 MG: 15 TABLET, FILM COATED ORAL at 01:32

## 2022-01-01 RX ADMIN — ACETAMINOPHEN 975 MG: 325 TABLET ORAL at 08:46

## 2022-01-01 RX ADMIN — GUAIFENESIN AND DEXTROMETHORPHAN HYDROBROMIDE 1 TABLET: 600; 30 TABLET, EXTENDED RELEASE ORAL at 01:13

## 2022-01-01 RX ADMIN — MICONAZOLE NITRATE: 2 POWDER TOPICAL at 08:02

## 2022-01-01 RX ADMIN — DEXAMETHASONE SODIUM PHOSPHATE 6 MG: 10 INJECTION, SOLUTION INTRAMUSCULAR; INTRAVENOUS at 00:37

## 2022-01-01 RX ADMIN — DEXAMETHASONE 6 MG: 4 TABLET ORAL at 13:51

## 2022-01-01 RX ADMIN — METHYLPREDNISOLONE SODIUM SUCCINATE 125 MG: 125 INJECTION, POWDER, FOR SOLUTION INTRAMUSCULAR; INTRAVENOUS at 18:49

## 2022-01-01 RX ADMIN — GUAIFENESIN AND DEXTROMETHORPHAN HYDROBROMIDE 1 TABLET: 600; 30 TABLET, EXTENDED RELEASE ORAL at 01:32

## 2022-01-01 RX ADMIN — SPIRONOLACTONE 25 MG: 25 TABLET, FILM COATED ORAL at 08:46

## 2022-01-01 RX ADMIN — SPIRONOLACTONE 25 MG: 25 TABLET, FILM COATED ORAL at 09:14

## 2022-01-01 RX ADMIN — SODIUM CHLORIDE 50 ML: 9 INJECTION, SOLUTION INTRAVENOUS at 17:34

## 2022-01-01 RX ADMIN — REMDESIVIR 200 MG: 100 INJECTION, POWDER, LYOPHILIZED, FOR SOLUTION INTRAVENOUS at 21:03

## 2022-01-01 RX ADMIN — IPRATROPIUM BROMIDE AND ALBUTEROL SULFATE 3 ML: 2.5; .5 SOLUTION RESPIRATORY (INHALATION) at 07:34

## 2022-01-01 RX ADMIN — MIRTAZAPINE 15 MG: 15 TABLET, FILM COATED ORAL at 20:41

## 2022-01-01 RX ADMIN — FERROUS SULFATE TAB 325 MG (65 MG ELEMENTAL FE) 325 MG: 325 (65 FE) TAB at 08:36

## 2022-01-01 RX ADMIN — METOPROLOL SUCCINATE 12.5 MG: 25 TABLET, EXTENDED RELEASE ORAL at 08:36

## 2022-01-01 RX ADMIN — REMDESIVIR 100 MG: 100 INJECTION, POWDER, LYOPHILIZED, FOR SOLUTION INTRAVENOUS at 19:00

## 2022-01-01 RX ADMIN — FERROUS SULFATE TAB 325 MG (65 MG ELEMENTAL FE) 325 MG: 325 (65 FE) TAB at 09:08

## 2022-01-01 RX ADMIN — IPRATROPIUM BROMIDE AND ALBUTEROL SULFATE 3 ML: 2.5; .5 SOLUTION RESPIRATORY (INHALATION) at 20:10

## 2022-01-01 RX ADMIN — APIXABAN 5 MG: 5 TABLET, FILM COATED ORAL at 08:46

## 2022-01-01 RX ADMIN — ACETAMINOPHEN 975 MG: 325 TABLET ORAL at 21:37

## 2022-01-01 RX ADMIN — DEXAMETHASONE 6 MG: 4 TABLET ORAL at 12:58

## 2022-01-01 RX ADMIN — ANORECTAL OINTMENT: 15.7; .44; 24; 20.6 OINTMENT TOPICAL at 09:05

## 2022-01-01 RX ADMIN — IPRATROPIUM BROMIDE AND ALBUTEROL SULFATE 3 ML: 2.5; .5 SOLUTION RESPIRATORY (INHALATION) at 11:48

## 2022-01-01 RX ADMIN — ANORECTAL OINTMENT: 15.7; .44; 24; 20.6 OINTMENT TOPICAL at 19:55

## 2022-01-01 RX ADMIN — DEXAMETHASONE 6 MG: 4 TABLET ORAL at 13:43

## 2022-01-01 RX ADMIN — HALOPERIDOL LACTATE 2 MG: 5 INJECTION, SOLUTION INTRAMUSCULAR at 20:42

## 2022-01-01 RX ADMIN — SPIRONOLACTONE 25 MG: 25 TABLET, FILM COATED ORAL at 08:37

## 2022-01-01 RX ADMIN — Medication 500 MCG: at 09:07

## 2022-01-01 RX ADMIN — MIRTAZAPINE 15 MG: 15 TABLET, FILM COATED ORAL at 21:34

## 2022-01-01 RX ADMIN — MICONAZOLE NITRATE: 2 POWDER TOPICAL at 20:27

## 2022-01-01 RX ADMIN — CITALOPRAM HYDROBROMIDE 20 MG: 20 TABLET ORAL at 08:01

## 2022-01-01 RX ADMIN — APIXABAN 5 MG: 5 TABLET, FILM COATED ORAL at 21:15

## 2022-01-01 RX ADMIN — GUAIFENESIN AND DEXTROMETHORPHAN HYDROBROMIDE 1 TABLET: 600; 30 TABLET, EXTENDED RELEASE ORAL at 12:58

## 2022-01-01 RX ADMIN — ACETAMINOPHEN 975 MG: 325 TABLET ORAL at 13:00

## 2022-01-01 RX ADMIN — SENNOSIDES 1 TABLET: 8.6 TABLET, FILM COATED ORAL at 09:04

## 2022-01-01 RX ADMIN — CITALOPRAM HYDROBROMIDE 20 MG: 20 TABLET ORAL at 09:09

## 2022-01-01 RX ADMIN — APIXABAN 5 MG: 5 TABLET, FILM COATED ORAL at 20:20

## 2022-01-01 RX ADMIN — POLYETHYLENE GLYCOL 3350 17 G: 17 POWDER, FOR SOLUTION ORAL at 09:16

## 2022-01-01 RX ADMIN — IPRATROPIUM BROMIDE AND ALBUTEROL SULFATE 3 ML: 2.5; .5 SOLUTION RESPIRATORY (INHALATION) at 15:30

## 2022-01-01 RX ADMIN — GUAIFENESIN AND DEXTROMETHORPHAN HYDROBROMIDE 1 TABLET: 600; 30 TABLET, EXTENDED RELEASE ORAL at 02:09

## 2022-01-01 RX ADMIN — SODIUM CHLORIDE 1000 ML: 9 INJECTION, SOLUTION INTRAVENOUS at 02:47

## 2022-01-01 RX ADMIN — BUMETANIDE 2 MG: 2 TABLET ORAL at 08:02

## 2022-01-01 RX ADMIN — UMECLIDINIUM BROMIDE AND VILANTEROL TRIFENATATE 1 PUFF: 62.5; 25 POWDER RESPIRATORY (INHALATION) at 08:02

## 2022-01-01 RX ADMIN — ANORECTAL OINTMENT: 15.7; .44; 24; 20.6 OINTMENT TOPICAL at 09:16

## 2022-01-01 RX ADMIN — IPRATROPIUM BROMIDE AND ALBUTEROL SULFATE 3 ML: 2.5; .5 SOLUTION RESPIRATORY (INHALATION) at 19:54

## 2022-01-01 RX ADMIN — ACETAMINOPHEN 975 MG: 325 TABLET ORAL at 08:02

## 2022-01-01 RX ADMIN — ACETAMINOPHEN 975 MG: 325 TABLET ORAL at 13:43

## 2022-01-01 RX ADMIN — ANORECTAL OINTMENT: 15.7; .44; 24; 20.6 OINTMENT TOPICAL at 13:54

## 2022-01-01 RX ADMIN — IPRATROPIUM BROMIDE AND ALBUTEROL SULFATE 3 ML: 2.5; .5 SOLUTION RESPIRATORY (INHALATION) at 07:06

## 2022-01-01 RX ADMIN — METOPROLOL SUCCINATE 12.5 MG: 25 TABLET, EXTENDED RELEASE ORAL at 09:06

## 2022-01-01 RX ADMIN — UMECLIDINIUM BROMIDE AND VILANTEROL TRIFENATATE 1 PUFF: 62.5; 25 POWDER RESPIRATORY (INHALATION) at 09:06

## 2022-01-01 RX ADMIN — IPRATROPIUM BROMIDE AND ALBUTEROL SULFATE 3 ML: 2.5; .5 SOLUTION RESPIRATORY (INHALATION) at 07:24

## 2022-01-01 RX ADMIN — ACETAMINOPHEN 975 MG: 325 TABLET ORAL at 20:20

## 2022-01-01 RX ADMIN — BUMETANIDE 2 MG: 2 TABLET ORAL at 09:09

## 2022-01-01 RX ADMIN — ACETAMINOPHEN 975 MG: 325 TABLET ORAL at 13:51

## 2022-01-01 RX ADMIN — BUMETANIDE 2 MG: 2 TABLET ORAL at 13:08

## 2022-01-01 RX ADMIN — APIXABAN 5 MG: 5 TABLET, FILM COATED ORAL at 21:40

## 2022-01-01 RX ADMIN — ACETAMINOPHEN 975 MG: 325 TABLET ORAL at 08:36

## 2022-01-01 RX ADMIN — SPIRONOLACTONE 25 MG: 25 TABLET, FILM COATED ORAL at 08:03

## 2022-01-01 RX ADMIN — APIXABAN 5 MG: 5 TABLET, FILM COATED ORAL at 09:08

## 2022-01-01 RX ADMIN — METOPROLOL SUCCINATE 12.5 MG: 25 TABLET, EXTENDED RELEASE ORAL at 08:02

## 2022-01-01 RX ADMIN — MICONAZOLE NITRATE: 2 POWDER TOPICAL at 13:54

## 2022-01-01 RX ADMIN — HALOPERIDOL LACTATE 2 MG: 5 INJECTION, SOLUTION INTRAMUSCULAR at 20:32

## 2022-01-01 RX ADMIN — Medication 500 MCG: at 09:08

## 2022-01-01 RX ADMIN — REMDESIVIR 100 MG: 100 INJECTION, POWDER, LYOPHILIZED, FOR SOLUTION INTRAVENOUS at 17:27

## 2022-01-01 RX ADMIN — MICONAZOLE NITRATE: 2 POWDER TOPICAL at 08:44

## 2022-01-01 RX ADMIN — IPRATROPIUM BROMIDE AND ALBUTEROL SULFATE 3 ML: 2.5; .5 SOLUTION RESPIRATORY (INHALATION) at 12:00

## 2022-01-01 RX ADMIN — Medication 500 MCG: at 08:46

## 2022-01-01 RX ADMIN — ACETAMINOPHEN 975 MG: 325 TABLET ORAL at 14:22

## 2022-01-01 RX ADMIN — APIXABAN 5 MG: 5 TABLET, FILM COATED ORAL at 08:37

## 2022-01-01 RX ADMIN — GUAIFENESIN AND DEXTROMETHORPHAN HYDROBROMIDE 1 TABLET: 600; 30 TABLET, EXTENDED RELEASE ORAL at 13:43

## 2022-01-01 RX ADMIN — IPRATROPIUM BROMIDE AND ALBUTEROL SULFATE 3 ML: 2.5; .5 SOLUTION RESPIRATORY (INHALATION) at 07:20

## 2022-01-01 RX ADMIN — MICONAZOLE NITRATE: 2 POWDER TOPICAL at 21:16

## 2022-01-01 RX ADMIN — FERROUS SULFATE TAB 325 MG (65 MG ELEMENTAL FE) 325 MG: 325 (65 FE) TAB at 09:04

## 2022-01-01 RX ADMIN — MICONAZOLE NITRATE: 2 POWDER TOPICAL at 19:56

## 2022-01-01 RX ADMIN — Medication 500 MCG: at 08:02

## 2022-01-01 RX ADMIN — BUMETANIDE 2 MG: 2 TABLET ORAL at 08:46

## 2022-01-01 RX ADMIN — IPRATROPIUM BROMIDE AND ALBUTEROL SULFATE 3 ML: 2.5; .5 SOLUTION RESPIRATORY (INHALATION) at 20:07

## 2022-01-01 RX ADMIN — CITALOPRAM HYDROBROMIDE 20 MG: 20 TABLET ORAL at 09:06

## 2022-01-01 RX ADMIN — CEFTRIAXONE SODIUM 1 G: 1 INJECTION, POWDER, FOR SOLUTION INTRAMUSCULAR; INTRAVENOUS at 20:20

## 2022-01-01 RX ADMIN — IPRATROPIUM BROMIDE AND ALBUTEROL SULFATE 3 ML: 2.5; .5 SOLUTION RESPIRATORY (INHALATION) at 09:07

## 2022-01-01 RX ADMIN — ACETAMINOPHEN 975 MG: 325 TABLET ORAL at 09:08

## 2022-01-01 RX ADMIN — UMECLIDINIUM BROMIDE AND VILANTEROL TRIFENATATE 1 PUFF: 62.5; 25 POWDER RESPIRATORY (INHALATION) at 09:15

## 2022-01-01 RX ADMIN — BUMETANIDE 2 MG: 2 TABLET ORAL at 08:36

## 2022-01-01 RX ADMIN — SODIUM CHLORIDE 50 ML: 9 INJECTION, SOLUTION INTRAVENOUS at 22:22

## 2022-01-01 RX ADMIN — APIXABAN 5 MG: 5 TABLET, FILM COATED ORAL at 09:07

## 2022-01-01 RX ADMIN — MICONAZOLE NITRATE: 2 POWDER TOPICAL at 21:45

## 2022-01-01 RX ADMIN — AZITHROMYCIN MONOHYDRATE 500 MG: 500 INJECTION, POWDER, LYOPHILIZED, FOR SOLUTION INTRAVENOUS at 19:28

## 2022-01-01 RX ADMIN — ACETAMINOPHEN 975 MG: 325 TABLET ORAL at 19:53

## 2022-01-01 RX ADMIN — IPRATROPIUM BROMIDE AND ALBUTEROL SULFATE 3 ML: 2.5; .5 SOLUTION RESPIRATORY (INHALATION) at 15:46

## 2022-01-01 RX ADMIN — CITALOPRAM HYDROBROMIDE 20 MG: 20 TABLET ORAL at 08:37

## 2022-01-01 RX ADMIN — IPRATROPIUM BROMIDE AND ALBUTEROL SULFATE 3 ML: 2.5; .5 SOLUTION RESPIRATORY (INHALATION) at 15:24

## 2022-01-01 RX ADMIN — GUAIFENESIN AND DEXTROMETHORPHAN HYDROBROMIDE 1 TABLET: 600; 30 TABLET, EXTENDED RELEASE ORAL at 01:35

## 2022-01-01 RX ADMIN — SENNOSIDES 1 TABLET: 8.6 TABLET, FILM COATED ORAL at 19:53

## 2022-01-01 RX ADMIN — ACETAMINOPHEN 975 MG: 325 TABLET ORAL at 09:04

## 2022-01-01 RX ADMIN — APIXABAN 5 MG: 5 TABLET, FILM COATED ORAL at 08:03

## 2022-01-01 RX ADMIN — ANORECTAL OINTMENT: 15.7; .44; 24; 20.6 OINTMENT TOPICAL at 21:15

## 2022-01-01 RX ADMIN — IPRATROPIUM BROMIDE AND ALBUTEROL SULFATE 3 ML: 2.5; .5 SOLUTION RESPIRATORY (INHALATION) at 16:23

## 2022-01-01 RX ADMIN — POLYETHYLENE GLYCOL 3350 17 G: 17 POWDER, FOR SOLUTION ORAL at 09:05

## 2022-01-01 RX ADMIN — FERROUS SULFATE TAB 325 MG (65 MG ELEMENTAL FE) 325 MG: 325 (65 FE) TAB at 08:03

## 2022-01-01 RX ADMIN — HALOPERIDOL LACTATE 2 MG: 5 INJECTION, SOLUTION INTRAMUSCULAR at 11:52

## 2022-01-01 RX ADMIN — METOPROLOL SUCCINATE 12.5 MG: 25 TABLET, EXTENDED RELEASE ORAL at 08:46

## 2022-01-01 RX ADMIN — SENNOSIDES 1 TABLET: 8.6 TABLET, FILM COATED ORAL at 09:15

## 2022-01-01 RX ADMIN — ANORECTAL OINTMENT: 15.7; .44; 24; 20.6 OINTMENT TOPICAL at 20:29

## 2022-01-01 RX ADMIN — CITALOPRAM HYDROBROMIDE 20 MG: 20 TABLET ORAL at 08:46

## 2022-01-01 RX ADMIN — Medication 500 MCG: at 08:36

## 2022-01-01 RX ADMIN — MICONAZOLE NITRATE: 2 POWDER TOPICAL at 09:06

## 2022-01-01 RX ADMIN — IPRATROPIUM BROMIDE AND ALBUTEROL SULFATE 3 ML: 2.5; .5 SOLUTION RESPIRATORY (INHALATION) at 18:33

## 2022-01-01 RX ADMIN — IPRATROPIUM BROMIDE AND ALBUTEROL SULFATE 3 ML: 2.5; .5 SOLUTION RESPIRATORY (INHALATION) at 20:03

## 2022-01-01 RX ADMIN — APIXABAN 5 MG: 5 TABLET, FILM COATED ORAL at 19:54

## 2022-01-01 RX ADMIN — MIRTAZAPINE 15 MG: 15 TABLET, FILM COATED ORAL at 21:15

## 2022-01-01 RX ADMIN — ALBUTEROL SULFATE 2.5 MG: 2.5 SOLUTION RESPIRATORY (INHALATION) at 06:21

## 2022-01-01 RX ADMIN — GUAIFENESIN AND DEXTROMETHORPHAN HYDROBROMIDE 1 TABLET: 600; 30 TABLET, EXTENDED RELEASE ORAL at 12:28

## 2022-01-01 RX ADMIN — ANORECTAL OINTMENT: 15.7; .44; 24; 20.6 OINTMENT TOPICAL at 21:47

## 2022-01-01 RX ADMIN — MIRTAZAPINE 15 MG: 15 TABLET, FILM COATED ORAL at 21:40

## 2022-01-01 RX ADMIN — ACETAMINOPHEN 975 MG: 325 TABLET ORAL at 21:15

## 2022-01-01 RX ADMIN — ANORECTAL OINTMENT: 15.7; .44; 24; 20.6 OINTMENT TOPICAL at 08:02

## 2022-01-01 RX ADMIN — HALOPERIDOL LACTATE 2 MG: 5 INJECTION, SOLUTION INTRAMUSCULAR at 20:20

## 2022-01-01 ASSESSMENT — ACTIVITIES OF DAILY LIVING (ADL)
ADLS_ACUITY_SCORE: 49
BATHING: 1-->ASSISTANCE NEEDED
ADLS_ACUITY_SCORE: 35
DRESSING/BATHING_DIFFICULTY: YES
ADLS_ACUITY_SCORE: 51
ADLS_ACUITY_SCORE: 53
TOILETING: 1-->ASSISTANCE (EQUIPMENT/PERSON) NEEDED
ADLS_ACUITY_SCORE: 39
ADLS_ACUITY_SCORE: 49
FALL_HISTORY_WITHIN_LAST_SIX_MONTHS: NO
ADLS_ACUITY_SCORE: 45
ADLS_ACUITY_SCORE: 53
ADLS_ACUITY_SCORE: 39
ADLS_ACUITY_SCORE: 44
ADLS_ACUITY_SCORE: 35
ADLS_ACUITY_SCORE: 47
ADLS_ACUITY_SCORE: 35
ADLS_ACUITY_SCORE: 47
ADLS_ACUITY_SCORE: 39
ADLS_ACUITY_SCORE: 47
ADLS_ACUITY_SCORE: 39
ADLS_ACUITY_SCORE: 47
ADLS_ACUITY_SCORE: 35
ADLS_ACUITY_SCORE: 45
ADLS_ACUITY_SCORE: 45
ADLS_ACUITY_SCORE: 39
ADLS_ACUITY_SCORE: 45
ADLS_ACUITY_SCORE: 47
EQUIPMENT_CURRENTLY_USED_AT_HOME: WALKER, ROLLING
ADLS_ACUITY_SCORE: 49
ADLS_ACUITY_SCORE: 49
DRESS: 1-->ASSISTANCE (EQUIPMENT/PERSON) NEEDED (NOT DEVELOPMENTALLY APPROPRIATE)
ADLS_ACUITY_SCORE: 44
DRESS: 1-->ASSISTANCE (EQUIPMENT/PERSON) NEEDED
ADLS_ACUITY_SCORE: 51
ADLS_ACUITY_SCORE: 45
TOILETING_ASSISTANCE: TOILETING DIFFICULTY, ASSISTANCE 1 PERSON
ADLS_ACUITY_SCORE: 51
ADLS_ACUITY_SCORE: 51
ADLS_ACUITY_SCORE: 44
ADLS_ACUITY_SCORE: 49
WALKING_OR_CLIMBING_STAIRS_DIFFICULTY: YES
WALKING_OR_CLIMBING_STAIRS: STAIR CLIMBING DIFFICULTY, ASSISTANCE 1 PERSON
DRESSING/BATHING: BATHING DIFFICULTY, ASSISTANCE 1 PERSON
ADLS_ACUITY_SCORE: 35
TRANSFERRING: 1-->ASSISTANCE (EQUIPMENT/PERSON) NEEDED
ADLS_ACUITY_SCORE: 49
DOING_ERRANDS_INDEPENDENTLY_DIFFICULTY: NO
DIFFICULTY_EATING/SWALLOWING: NO
ADLS_ACUITY_SCORE: 44
ADLS_ACUITY_SCORE: 49
ADLS_ACUITY_SCORE: 45
ADLS_ACUITY_SCORE: 51
ADLS_ACUITY_SCORE: 44
ADLS_ACUITY_SCORE: 39
ADLS_ACUITY_SCORE: 53
CHANGE_IN_FUNCTIONAL_STATUS_SINCE_ONSET_OF_CURRENT_ILLNESS/INJURY: NO
ADLS_ACUITY_SCORE: 47
ADLS_ACUITY_SCORE: 49
ADLS_ACUITY_SCORE: 51
ADLS_ACUITY_SCORE: 45
ADLS_ACUITY_SCORE: 51
ADLS_ACUITY_SCORE: 53
CONCENTRATING,_REMEMBERING_OR_MAKING_DECISIONS_DIFFICULTY: YES
TOILETING_ISSUES: YES
WEAR_GLASSES_OR_BLIND: NO
ADLS_ACUITY_SCORE: 51
TRANSFERRING: 1-->ASSISTANCE (EQUIPMENT/PERSON) NEEDED (NOT DEVELOPMENTALLY APPROPRIATE)
TOILETING: 1-->ASSISTANCE (EQUIPMENT/PERSON) NEEDED (NOT DEVELOPMENTALLY APPROPRIATE)
ADLS_ACUITY_SCORE: 51
ADLS_ACUITY_SCORE: 39
ADLS_ACUITY_SCORE: 45

## 2022-07-04 NOTE — ED TRIAGE NOTES
Patient had persistent dry cough for 1 week, family decided to call EMS. Patient sats at 93% on RA. Notably short of breath with expiratory wheezes. Patient states that is how she usually sounds     Triage Assessment     Row Name 07/04/22 1813       Triage Assessment (Adult)    Airway WDL WDL    Additional Documentation Breath Sounds (Group)       Respiratory WDL    Respiratory WDL X;rhythm/pattern;cough    Rhythm/Pattern, Respiratory shortness of breath;dyspnea on exertion    Cough Frequency frequent    Cough Type dry       Breath Sounds    Breath Sounds All Fields    All Lung Fields Breath Sounds wheezes, expiratory       Skin Circulation/Temperature WDL    Skin Circulation/Temperature WDL WDL       Cardiac WDL    Cardiac WDL WDL       Peripheral/Neurovascular WDL    Peripheral Neurovascular WDL WDL       Cognitive/Neuro/Behavioral WDL    Cognitive/Neuro/Behavioral WDL WDL

## 2022-07-04 NOTE — ED NOTES
Bed: JNED-12  Expected date: 7/4/22  Expected time: 6:00 PM  Means of arrival:   Comments:  89 y.o. F low O2

## 2022-07-04 NOTE — ED PROVIDER NOTES
"EMERGENCY DEPARTMENT ENCOUNTER      NAME: Cha Smith  AGE: 89 year old female  YOB: 1933  MRN: [id number]  EVALUATION DATE & TIME: 7/4/2022  6:03 PM    PCP: Tanisha April    ED PROVIDER: Carmen Mukherjee M.D.      Chief Complaint   Patient presents with     Cough     Shortness of Breath         FINAL IMPRESSION:  1. COPD exacerbation (H)    2. Acute respiratory failure with hypoxia (H)    3. Pneumonia due to infectious organism, unspecified laterality, unspecified part of lung    4. Sepsis, due to unspecified organism, unspecified whether acute organ dysfunction present (H)    5. Lactic acidosis    6. Infection due to 2019 novel coronavirus    7. Delirium          ED COURSE & MEDICAL DECISION MAKING:    ED Course as of 07/04/22 2313 Mon Jul 04, 2022   1822 Patient with cough with hypoxia with COPD and some slight retractions but speaking in full sentences while reclined, wheezing notable. Duonebs/steroids administered and will check CXR, COVID19 PCR and BNP although no florid rales or leg edema, eusebia shaw to plan to admit for COPD exacerbation with hypoxia for serial nebulizer treatments with one week of worsening and to assess for signs of pneumonia.   1849 Patient with SIRS with HR 91 and hypoxia and XR with suggestion of retrocardiac density with possible community acquired pneumonia thus begun on ceftriaxone and azithromycin, cultures pending, will admit after screening labs for antibiotic therapy   1904 Patient with slightly increased lactic acid, will repeat after IVF.   1935 Charge RN RH seeking bed availability, no med surg available here at this time, seeking both inside  and outside of system.   2010 Pt COVID19 + on PCR. Patient anxious, given haldol 2mg IV with some delirium, demented overall.   2015 Patient becoming somewhat delirious and shouting \"help\" but easily consolable/no focal point to her yelling overall, ABG pending, begun on decadron with remdesivir with COVID19+ " and ddimer ordered not out of thought for PE but specifically pending admission with COVID19, and with delirium will do screening for hypoxia and acidosis, still awaiting bed availability, per care set recommendations, remdesivir therapy recommended   2043 I spoke with Stephenie, patient's daughter, who prefers home phone number at [phone], who notes patient does reside in dementia unit. She notes her mother becomes delirious whenever admitted, aware of plan to admit.   2044 Patient receiving further haldol with delirium at this time.   2105 Per charge RN RH, SOC says bed available for admission for patient, awaiting hospitalist call, unsure of location    2234 Hospitalist at receiving hospital repaged re: plan to admit   2254 I updated patient's daughter Stephenie re: planned transfer and she requests I call Christine, her other sister at [phone] or Terra at [phone]   2300 I spoke with Christine re: planned transfer to [facility], she says she is okay with planned transfer to [facility] and aware of COVID19 +   2309 I spoke with hospitalist at [facility] Dr Ba who accepts transfer to [facility] for admission       Pertinent Labs & Imaging studies reviewed. (See chart for details)    N95 worn  A face shield was worn also  COVID PPE    6:17 PM I met with patient for initial encounter.  At the conclusion of the encounter I discussed the results of all of the tests and the disposition. The questions were answered. The patient or family acknowledged understanding and was agreeable with the care plan.     Critical Care time was 85 minutes for this patient excluding procedures.      MEDICATIONS GIVEN IN THE EMERGENCY:  Medications   dexamethasone PF (DECADRON) injection 6 mg (has no administration in time range)   remdesivir 100 mg in sodium chloride 0.9 % 250 mL intermittent infusion (has no administration in time range)     And   0.9% sodium chloride BOLUS (has no administration in time range)   methylPREDNISolone  sodium succinate (solu-MEDROL) injection 125 mg (125 mg Intravenous Given 7/4/22 1849)   ipratropium - albuterol 0.5 mg/2.5 mg/3 mL (DUONEB) neb solution 3 mL (3 mLs Nebulization Given 7/4/22 1833)   cefTRIAXone (ROCEPHIN) 1 g vial to attach to  mL bag for ADULTS or NS 50 mL bag for PEDS (0 g Intravenous Stopped 7/4/22 2106)   azithromycin (ZITHROMAX) 500 mg in sodium chloride 0.9 % 250 mL intermittent infusion (0 mg Intravenous Stopped 7/4/22 2043)   haloperidol lactate (HALDOL) injection 2 mg (2 mg Intravenous Given 7/4/22 2042)   remdesivir 200 mg in sodium chloride 0.9 % 250 mL intermittent infusion (0 mg Intravenous Stopped 7/4/22 2222)     Followed by   0.9% sodium chloride BOLUS (0 mLs Intravenous Stopped 7/4/22 2256)       NEW PRESCRIPTIONS STARTED AT TODAY'S ER VISIT  New Prescriptions    No medications on file          =================================================================    HPI      Cha Smith is a 89 year old female with PMHx of left lung adinocarcinoma, afib on Eliquis and prior DVT, COPD, dementia, HTN, who presents to the ED today via EMS with cough, shortness of breath.    Armindan reports 1 week non productive cough, today she has shortness of breath with wheezing. Patient resides in assisted living, staff called [phone] after noticing wheezing. Patient is not on antibiotics or steroids to her knowledge and says she quit smoking 20 years ago. Patient states she was no given breathing treatments prior to transport and is not chronically on home oxygen.    Patient denies fever, sick contacts, chest pain, abdominal pain, leg swelling, and has normal urine output.      REVIEW OF SYSTEMS   All other systems reviewed and are negative except as noted above in HPI.    PAST MEDICAL HISTORY:  Past Medical History:   Diagnosis Date     A-fib (H)      Breast cancer (H)      CLL (chronic lymphocytic leukemia) (H)      COPD (chronic obstructive pulmonary disease) (H)      Dementia (H)      DVT  (deep venous thrombosis) (H)      Hypertension      Lung cancer (H)        PAST SURGICAL HISTORY:  Past Surgical History:   Procedure Laterality Date     BIOPSY BREAST       HERNIA REPAIR       HYSTERECTOMY       MASTECTOMY       ZZC PARTIAL HIP REPLACEMENT Left 1/13/2020    Procedure: HEMIARTHROPLASTY, HIP, UNIPOLAR;  Surgeon: Dav Steward MD;  [place] [facility] [place]: [place]opedics       CURRENT MEDICATIONS:    acetaminophen (TYLENOL) 500 MG tablet  albuterol (PROVENTIL) (2.5 MG/3ML) 0.083% neb solution  apixaban ANTICOAGULANT (ELIQUIS) 5 MG tablet  bisacodyl (DULCOLAX) 10 MG suppository  bumetanide (BUMEX) 2 MG tablet  calcium carbonate 500 mg, elemental, (OSCAL 500) 1250 (500 Ca) MG TABS tablet  citalopram (CELEXA) 20 MG tablet  cyanocobalamin (VITAMIN B-12) 500 MCG tablet  dextran 70-hypromellose (BION TEARS PF) 0.1-0.3 % ophthalmic solution  dextromethorphan-guaiFENesin (MUCINEX DM)  MG 12 hr tablet  ferrous sulfate (FEROSUL) 325 (65 Fe) MG tablet  ipratropium - albuterol 0.5 mg/2.5 mg/3 mL (DUONEB) 0.5-2.5 (3) MG/3ML neb solution  Melatonin 10 MG TABS tablet  menthol-zinc oxide (CALMOSEPTINE) 0.44-20.6 % OINT ointment  metoprolol succinate ER (TOPROL XL) 25 MG 24 hr tablet  mineral oil-hydrophilic petrolatum (AQUAPHOR) external ointment  mirtazapine (REMERON) 15 MG tablet  Neomycin-Bacitracin-Polymyxin (TRIPLE ANTIBIOTIC) 3.5-400-5000 OINT ointment  nystatin (MYCOSTATIN) 315782 UNIT/GM external powder  polyethylene glycol (MIRALAX) 17 g packet  predniSONE (DELTASONE) 10 MG tablet  sennosides (SENOKOT) 8.6 MG tablet  spironolactone (ALDACTONE) 25 MG tablet  umeclidinium-vilanterol (ANORO ELLIPTA) 62.5-25 MCG/INH oral inhaler  Vitamin D3 (CHOLECALCIFEROL) 125 MCG (5000 UT) tablet        ALLERGIES:  Allergies   Allergen Reactions     Blood-Group Specific Substance      Pneumococcal Vaccine      Prevnar 13 [Pneumococcal 13-Roseanna Conj Vacc]        FAMILY HISTORY:  History reviewed. No  "pertinent family history.    SOCIAL HISTORY:   Social History     Socioeconomic History     Marital status:      Spouse name: None     Number of children: None     Years of education: None     Highest education level: None   Tobacco Use     Smoking status: Former Smoker     Smokeless tobacco: Former User   Substance and Sexual Activity     Alcohol use: Not Currently     Drug use: Not Currently     Sexual activity: Not Currently       VITALS:  Patient Vitals for the past 24 hrs:   BP Temp Temp src Pulse Resp SpO2 Height Weight   07/04/22 2215 118/58 -- -- 91 -- 97 % -- --   07/04/22 2200 (!) 158/70 -- -- 89 -- 95 % -- --   07/04/22 2145 (!) 164/74 -- -- 94 -- 96 % -- --   07/04/22 2130 (!) 169/124 -- -- 95 -- 96 % -- --   07/04/22 2115 (!) 162/103 -- -- 96 -- 95 % -- --   07/04/22 2100 (!) 157/72 -- -- 96 -- 92 % -- --   07/04/22 2045 (!) 154/74 -- -- 94 -- 94 % -- --   07/04/22 1915 134/60 -- -- 99 22 95 % -- --   07/04/22 1905 -- -- -- 100 -- 92 % -- --   07/04/22 1900 131/60 -- -- 91 -- -- -- --   07/04/22 1845 -- -- -- 100 -- 98 % -- --   07/04/22 1830 123/59 -- -- 100 -- 92 % -- --   07/04/22 1815 116/72 -- -- 100 -- 94 % -- --   07/04/22 1807 (!) 147/63 98.3  F (36.8  C) Oral 91 24 98 % 1.676 m (5' 6\") 83.9 kg (185 lb)       PHYSICAL EXAM    GENERAL: Awake, alert.  In no acute distress.   HEENT: Normocephalic, atraumatic.  Pupils equal, round and reactive.  Conjunctiva normal.  EOMI.  NECK: No stridor or apparent deformity.  PULMONARY: Diffuse expiratory wheezing with moderate air entry. Positive intercostal retractions. SaO2 90% room air.  CARDIO: Regular rate and rhythm.  No significant murmur, rub or gallop.  Radial pulses strong and symmetrical.  ABDOMINAL: Abdomen soft, non-distended and non-tender to palpation.  No CVAT, no palpable hepatosplenomegaly.  EXTREMITIES: No lower extremity swelling or edema.    NEURO: Alert and oriented to person, place and time.  Cranial nerves grossly intact.  No " focal motor deficit.  PSYCH: Normal mood and affect  SKIN: No rashes      LAB:  All pertinent labs reviewed and interpreted.  Results for orders placed or performed during the hospital encounter of 07/04/22   XR Chest Port 1 View    Impression    IMPRESSION:     Left chest port with catheter tip in the SVC. Surgical clips in the bilateral axillary regions.    Dense retrocardiac opacity, either atelectasis, infiltrate, and/or small pleural effusion.    Left midlung atelectasis/scarring, similar to prior.    No focal airspace disease in the right lung. No right pleural effusion. No pneumothorax.    Stable mild cardiomegaly. Aortic calcifications.   Result Value Ref Range    INR 1.13 0.85 - 1.15   Comprehensive metabolic panel   Result Value Ref Range    Sodium 141 136 - 145 mmol/L    Potassium 4.2 3.5 - 5.0 mmol/L    Chloride 103 98 - 107 mmol/L    Carbon Dioxide (CO2) 27 22 - 31 mmol/L    Anion Gap 11 5 - 18 mmol/L    Urea Nitrogen 24 8 - 28 mg/dL    Creatinine 1.17 (H) 0.60 - 1.10 mg/dL    Calcium 9.7 8.5 - 10.5 mg/dL    Glucose 130 (H) 70 - 125 mg/dL    Alkaline Phosphatase 69 45 - 120 U/L    AST 12 0 - 40 U/L    ALT 14 0 - 45 U/L    Protein Total 6.2 6.0 - 8.0 g/dL    Albumin 3.6 3.5 - 5.0 g/dL    Bilirubin Total 0.3 0.0 - 1.0 mg/dL    GFR Estimate 44 (L) >60 mL/min/1.73m2   Lactic acid whole blood   Result Value Ref Range    Lactic Acid 2.8 (H) 0.7 - 2.0 mmol/L   Result Value Ref Range    Magnesium 2.2 1.8 - 2.6 mg/dL   Result Value Ref Range    Troponin I 0.01 0.00 - 0.29 ng/mL   B-Type Natriuretic Peptide ( [place] Only)   Result Value Ref Range     0 - 167 pg/mL   UA with Microscopic reflex to Culture    Specimen: Urine, Midstream   Result Value Ref Range    Color Urine Light Yellow Colorless, Straw, Light Yellow, Yellow    Appearance Urine Clear Clear    Glucose Urine Negative Negative mg/dL    Bilirubin Urine Negative Negative    Ketones Urine Negative Negative mg/dL    Specific Gravity Urine 1.019  1.001 - 1.030    Blood Urine Negative Negative    pH Urine 6.0 5.0 - 7.0    Protein Albumin Urine Negative Negative mg/dL    Urobilinogen Urine <2.0 <2.0 mg/dL    Nitrite Urine Negative Negative    Leukocyte Esterase Urine Negative Negative    RBC Urine 1 <=2 /HPF    WBC Urine <1 <=5 /HPF    Squamous Epithelials Urine 2 (H) <=1 /HPF    Hyaline Casts Urine 6 (H) <=2 /LPF   Symptomatic; Unknown Influenza A/B & SARS-CoV2 (COVID-19) Virus PCR Multiplex Nose    Specimen: Nose; Swab   Result Value Ref Range    Influenza A PCR Negative Negative    Influenza B PCR Negative Negative    SARS CoV2 PCR Positive (A) Negative   Result Value Ref Range    Procalcitonin 0.05 0.00 - 0.49 ng/mL   CBC with platelets and differential   Result Value Ref Range    WBC Count 27.9 (H) 4.0 - 11.0 10e3/uL    RBC Count 3.81 3.80 - 5.20 10e6/uL    Hemoglobin 12.3 11.7 - 15.7 g/dL    Hematocrit 39.2 35.0 - 47.0 %     (H) 78 - 100 fL    MCH 32.3 26.5 - 33.0 pg    MCHC 31.4 (L) 31.5 - 36.5 g/dL    RDW 13.9 10.0 - 15.0 %    Platelet Count 340 150 - 450 10e3/uL   D dimer quantitative   Result Value Ref Range    D-Dimer Quantitative 0.47 0.00 - 0.50 ug/mL FEU   Blood gas arterial   Result Value Ref Range    pH Arterial 7.44 7.37 - 7.44    pCO2 Arterial 44 35 - 45 mm Hg    pO2 Arterial 47 (LL) 75 - 85 mm Hg    Bicarbonate Arterial 29 23 - 29 mmol/L    O2 Sat, Arterial 85.5 (L) 95.0 - 96.0 %    Oxyhemoglobin 84.3 (LL) 95.0 - 96.0 %    Base Excess/Deficit (+/-) 6.1   mmol/L    Sample Stabilized Temperature 37.0 degrees C   Manual Differential   Result Value Ref Range    % Neutrophils 38 %    % Lymphocytes 56 %    % Monocytes 4 %    % Eosinophils 0 %    % Basophils 0 %    % Myelocytes 2 %    Absolute Neutrophils 10.6 (H) 1.6 - 8.3 10e3/uL    Absolute Lymphocytes 15.6 (H) 0.8 - 5.3 10e3/uL    Absolute Monocytes 1.1 0.0 - 1.3 10e3/uL    Absolute Eosinophils 0.0 0.0 - 0.7 10e3/uL    Absolute Basophils 0.0 0.0 - 0.2 10e3/uL    Absolute Myelocytes 0.6  (H) <=0.0 10e3/uL    RBC Morphology Confirmed RBC Indices     Platelet Assessment  Automated Count Confirmed. Platelet morphology is normal.     Automated Count Confirmed. Platelet morphology is normal.    Stomatocytes Slight (A) None Seen       RADIOLOGY:  Reviewed all pertinent imaging. Please see official radiology report.  XR Chest Port 1 View   Final Result   IMPRESSION:       Left chest port with catheter tip in the SVC. Surgical clips in the bilateral axillary regions.      Dense retrocardiac opacity, either atelectasis, infiltrate, and/or small pleural effusion.      Left midlung atelectasis/scarring, similar to prior.      No focal airspace disease in the right lung. No right pleural effusion. No pneumothorax.      Stable mild cardiomegaly. Aortic calcifications.                    I, Edu Norwood, am serving as a scribe to document services personally performed by Dr. Carmen Mukherjee based on my observation and the provider's statements to me. I, Carmen Mukherjee MD attest that Edu Norwood is acting in a scribe capacity, has observed my performance of the services and has documented them in accordance with my direction.       Carmen Mukherjee MD  07/04/22 2188

## 2022-07-05 PROBLEM — A41.9 SEPSIS, DUE TO UNSPECIFIED ORGANISM, UNSPECIFIED WHETHER ACUTE ORGAN DYSFUNCTION PRESENT (H): Status: ACTIVE | Noted: 2022-01-01

## 2022-07-05 PROBLEM — J18.9 PNEUMONIA DUE TO INFECTIOUS ORGANISM, UNSPECIFIED LATERALITY, UNSPECIFIED PART OF LUNG: Status: ACTIVE | Noted: 2022-01-01

## 2022-07-05 PROBLEM — R41.0 DELIRIUM: Status: ACTIVE | Noted: 2022-01-01

## 2022-07-05 PROBLEM — J44.1 COPD EXACERBATION (H): Status: ACTIVE | Noted: 2022-01-01

## 2022-07-05 PROBLEM — J96.01 ACUTE RESPIRATORY FAILURE WITH HYPOXIA (H): Status: ACTIVE | Noted: 2022-01-01

## 2022-07-05 PROBLEM — E87.20 LACTIC ACIDOSIS: Status: ACTIVE | Noted: 2022-01-01

## 2022-07-05 PROBLEM — U07.1 INFECTION DUE TO 2019 NOVEL CORONAVIRUS: Status: ACTIVE | Noted: 2022-01-01

## 2022-07-05 NOTE — TELEPHONE ENCOUNTER
Patient pending transfer from [facility].    89-year-old female with dementia who lives in assisted living presented with 1 week of cough and 1 day of shortness of breath.  Was saturating 90% on room air, was wheezing and tachypneic.  She got nebs and steroids and is breathing better.  Chest x-ray showed retrocardiac density and got Zosyn for possible pneumonia.  She also COVID-positive and got remdesivir and steroid.  She became delirious and needed 2 doses of Haldol.  Her POLST form states DNR/DNI.    Anirudh Ba MD  Internal Medicine Hospitalist  Pager: [phone]

## 2022-07-05 NOTE — PHARMACY-ADMISSION MEDICATION HISTORY
Pharmacy Note - Admission Medication History  ______________________________________________________________________    Prior To Admission (PTA) med list completed and updated in EMR.       PTA Med List   Medication Sig Last Dose     acetaminophen (TYLENOL) 500 MG tablet Take 1,000 mg by mouth 3 times daily 7/4/2022 at 1400     albuterol (PROVENTIL) (2.5 MG/3ML) 0.083% neb solution Take 2.5 mg by nebulization 3 times daily as needed      apixaban ANTICOAGULANT (ELIQUIS) 5 MG tablet Take 5 mg by mouth 2 times daily 7/4/2022 at am     bisacodyl (DULCOLAX) 10 MG suppository Place 10 mg rectally 2 times daily as needed for constipation      bumetanide (BUMEX) 2 MG tablet Take 2 mg by mouth daily 7/4/2022 at am     calcium carbonate 500 mg, elemental, (OSCAL 500) 1250 (500 Ca) MG TABS tablet Take 1 tablet by mouth daily 7/4/2022 at am     citalopram (CELEXA) 20 MG tablet Take 20 mg by mouth daily 7/4/2022 at am     cyanocobalamin (VITAMIN B-12) 500 MCG tablet Take 500 mcg by mouth daily 7/4/2022 at am     dextran 70-hypromellose (BION TEARS PF) 0.1-0.3 % ophthalmic solution Place 1 drop into the right eye 2 times daily 7/4/2022 at am     dextromethorphan-guaiFENesin (MUCINEX DM)  MG 12 hr tablet Take 1 tablet by mouth every 12 hours 7/4/2022 at am     ferrous sulfate (FEROSUL) 325 (65 Fe) MG tablet Take 325 mg by mouth daily 7/4/2022 at am     ipratropium - albuterol 0.5 mg/2.5 mg/3 mL (DUONEB) 0.5-2.5 (3) MG/3ML neb solution Take 1 vial by nebulization 4 times daily 7/4/2022 at 1200     Melatonin 10 MG TABS tablet Take 1 tablet by mouth At Bedtime 7/3/2022 at hs     menthol-zinc oxide (CALMOSEPTINE) 0.44-20.6 % OINT ointment Apply topically 2 times daily Upper and inner thigh. Radha area 7/4/2022 at am     metoprolol succinate ER (TOPROL XL) 25 MG 24 hr tablet Take 12.5 mg by mouth daily Hold if SBP < 100 7/4/2022 at am     mineral oil-hydrophilic petrolatum (AQUAPHOR) external ointment Apply topically At Bedtime  Both legs 7/3/2022 at hs     mirtazapine (REMERON) 15 MG tablet Take 15 mg by mouth At Bedtime 7/3/2022 at hs     nystatin (MYCOSTATIN) 414119 UNIT/GM external powder Apply topically 2 times daily Under breasts 7/4/2022 at am     polyethylene glycol (MIRALAX) 17 g packet Take 1 packet by mouth daily 7/4/2022 at am     predniSONE (DELTASONE) 10 MG tablet Take 10 mg by mouth daily 7/4/2022 at am     sennosides (SENOKOT) 8.6 MG tablet Take 1 tablet by mouth 2 times daily 7/4/2022 at am     spironolactone (ALDACTONE) 25 MG tablet Take 25 mg by mouth daily 7/4/2022 at am     umeclidinium-vilanterol (ANORO ELLIPTA) 62.5-25 MCG/INH oral inhaler Inhale 1 puff into the lungs daily 7/4/2022 at am     Vitamin D3 (CHOLECALCIFEROL) 125 MCG (5000 UT) tablet Take 5,000 Units by mouth daily 7/4/2022 at am       Information source(s): Facility ([facility]/) medication list/[facility]--[facility]  Method of interview communication: N/A    Summary of Changes to PTA Med List  New: Mucinex, bisacodyl   Discontinued: nothing recently   Changed: nothing recently     Patient was asked about OTC/herbal products specifically.  PTA med list reflects this.    In the past week, patient estimated taking medication this percent of the time:  greater than 90%.    Allergies were reviewed, assessed, and updated with the patient.      Patient did not bring any medications to the hospital and can't retrieve from home. No multi-dose medications are available for use during hospital stay.     The information provided in this note is only as accurate as the sources available at the time of the update(s).    Thank you for the opportunity to participate in the care of this patient.    Ray Puentes [facility]  7/4/2022 7:56 PM

## 2022-07-05 NOTE — PROGRESS NOTES
Pt to receive duo neb. BS are forced coarse exp wheezes. Pt is on 3lpm of oxygen via NC, SpO2 is 97%. Post treatment there is no increased aeration. Pt also perceives improvement.   RT will continue to monitor and assess.

## 2022-07-05 NOTE — H&P
Admission History and Physical   Cha Smith,  1933, MRN [id number]    [facility]  No admission diagnoses are documented for this encounter.    PCP: Tanisha April, [phone]   Code status:  No CPR- Do NOT Intubate       Extended Emergency Contact Information  Primary Emergency Contact: Stephenie Cummins  Home Phone: [phone]  Mobile Phone: [phone]  Relation: Daughter  Hearing or visual needs: None  Other needs: None  Preferred language: English   needed? No  Secondary Emergency Contact: Terra Hook  Address: [address]  Mobile Phone: [phone]  Relation: Daughter  Hearing or visual needs: None  Other needs: None  Preferred language: English   needed? No       Assessment and Plan   Cha Smith is a 89 year old female with PMH significant for COPD, a fib on apixaban, CKD, CLL, dementia, and lung cancer in sustained remission who presented to [facility] ED on  with multiple days of shortness of breath and cough. Requiring 2L NC and found to be covid positive with likely COPD exacerbation. On abx for concern for bacterial pneumonia.    Active Problems:    Lactic acidosis    Delirium    COPD exacerbation (H)    Acute respiratory failure with hypoxia (H)    Pneumonia due to infectious organism, unspecified laterality, unspecified part of lung    Sepsis, due to unspecified organism, unspecified whether acute organ dysfunction present (H)    Infection due to 2019 novel coronavirus    Acute hypoxic respiratory distress 2/2 covid infection  COPD, excacerbated  Increased SOB, wheezing, cough over last ~5 days. Covid + on admission and requiring 2L NC. Vaccinated x4 and no known sick contacts but lives in large assisted living facility. ABG with paO2 47 but otherwise fairly unremarkable. CXR with dense retrocardiac opacity, possibly infiltrate, but not clearly defined. Has hx of COPD, and feels like inhalers  helps when she's taking them. Most suspect covid tipped her COPD over the edge as lacking typical infiltrate findings suggestive of simply gross covid pneumonia. May be component of superimposed bacterial pneumonia.  Patient quit smoking 20 years ago.  -Supplemental O2 to maintain spO2 > 90%  -Dexamethasone x10 doses (start 7/4); hold PTA prednisone 10mg daily  -Remidesivir x3 doses (start 7/4)  -Abx for bacterial pneumonia coverage as below  -Duoneub scheduled QID, Anoro ellipta scheduled 1 puff daily, abuterol neb prn TID  -Mucinex DM scheduled BID    C/f bacterial pneumonia  Hyperlacticemia  CRP and procalcitonin borderline elevated; leukocytosis to 27k with lymphocytic predominance (in setting of CLL) and lactic elevated to 3.9. CXR significant for dense retrocardiac opacity, though unclear etiology. May all just be secondary to covid w/ leukocytosis secondary to CLL, but particularly given lactic and overall mixed picture, will treat for possible bacterial pneumonia at least for tonight.  -Ceftriaxone/Azithro   -1L NS bolus followed by lactic recheck  -CBC, CRP in AM  -Sputum cx; given in setting of covid  -F/u blood cx    Atrial fibrillation, on anticoagulation  NSR on admission. At home on metoprolol 12.5mg daily and apixaban 5mg BID.  -PTA apixaban  -PTA metoprolol    CKD 3B  eGFR 44, baseline 50-55.   -BMP in AM    CLL  WBC baseline 10.5-16.8, though data from 1 year ago. CLL  of chronic leukocytosis. CLL stable per chart review of outpatient notes.  -CBC in AM    Dementia  Lives in [facility] in dementia unit. Has POLST on file w/ DNR-DNI. Was confused and rambunctious in ED but improved after haldol and ativan.  -Melatonin at night  -Will hold off on ordering further antipsychotics rick    HTN  -165/58-76. At home on metoprolol 12.5mg daily, aldactone 25mg daily. Patient not sure of home pressures.  -PTA metoprolol  -Hold PTA aldactone; resume if pressures remain normal overnight    Ventral  "hernia  Wound over abdomen  Large ventral hernia. Difficult to reduce sheerly given size, however not painful and patient denies any changes. Small 1x1cm shallow wound over ventral hernia.  -Basic nursing wound cares; consider WOC if wound not improving    Chronic B/L LE edema: Takes bumex 2g daily for this, no clearly defined etiology. Possibly some undiagnosed HF vs chronic venous stasis. Hold PTA bumex for now.  Mental health: PTA citalopram 20mg daily, mirtazapine 15mg daily  History of lung cancer in remission: NTD  Hx of immune pneumonitis: In setting of cancer immunotherapy in past; on chronic 10mg prednisone. Hold PTA prednisone for now in setting of getting dexamethasone for covid.  Hx of EARL: PTA ferrous sulfate      Medication Reconciliation Status: Complete    FEN: 1L NS bolus now. No maintenance fluids rick.  DVT Prophylaxis: Apixaban  Code: DNR-DNI    Dispo: inpatient status for AHRF, anticipate discharge to prior facility   Barriers to discharge:  oxygen   Anticipated discharge date:  multiple more days     Chief Complaint: \"Shortness of breath\"     HPI:    Cha Smith is a 89 year old female with PMH significant for COPD, a fib on apixaban, CKD, CLL, and dementia who presented to [facility] ED on 7/4 via EMS with multiple days of shortness of breath and cough.    Patient states she has had about 5 to 6 days of shortness of breath and nonproductive cough.  Patient states that she began wheezing 2 days ago and that staff noticed today and thus brought her in.  Patient taking her inhalers as prescribed at home and says she does not know if she increased her as needed inhalers since its staff that ends up giving her her medications.  Per chart review she is also taking 10 mg prednisone daily however patient states she does not know if this is the case because she receives her meds from staff.  Patient states she is fully COVID vaccinated/boosted and denies any known sick contacts but states that she " does live in a large assisted-living facility and thus does not know if there was sick contacts.  Denies any chest pain, palpitations, fever or chills.    History is provided by patient     Medical History  .  Past Medical History:   Diagnosis Date     A-fib (H)      Breast cancer (H)      CLL (chronic lymphocytic leukemia) (H)      COPD (chronic obstructive pulmonary disease) (H)      Dementia (H)      DVT (deep venous thrombosis) (H)      Hypertension      Lung cancer (H)        Medications  No current facility-administered medications on file prior to encounter.  acetaminophen (TYLENOL) 500 MG tablet, Take 1,000 mg by mouth 3 times daily  albuterol (PROVENTIL) (2.5 MG/3ML) 0.083% neb solution, Take 2.5 mg by nebulization 3 times daily as needed  apixaban ANTICOAGULANT (ELIQUIS) 5 MG tablet, Take 5 mg by mouth 2 times daily  bisacodyl (DULCOLAX) 10 MG suppository, Place 10 mg rectally 2 times daily as needed for constipation  bumetanide (BUMEX) 2 MG tablet, Take 2 mg by mouth daily  calcium carbonate 500 mg, elemental, (OSCAL 500) 1250 (500 Ca) MG TABS tablet, Take 1 tablet by mouth daily  citalopram (CELEXA) 20 MG tablet, Take 20 mg by mouth daily  cyanocobalamin (VITAMIN B-12) 500 MCG tablet, Take 500 mcg by mouth daily  dextran 70-hypromellose (BION TEARS PF) 0.1-0.3 % ophthalmic solution, Place 1 drop into the right eye 2 times daily  dextromethorphan-guaiFENesin (MUCINEX DM)  MG 12 hr tablet, Take 1 tablet by mouth every 12 hours  ferrous sulfate (FEROSUL) 325 (65 Fe) MG tablet, Take 325 mg by mouth daily  ipratropium - albuterol 0.5 mg/2.5 mg/3 mL (DUONEB) 0.5-2.5 (3) MG/3ML neb solution, Take 1 vial by nebulization 4 times daily  Melatonin 10 MG TABS tablet, Take 1 tablet by mouth At Bedtime  menthol-zinc oxide (CALMOSEPTINE) 0.44-20.6 % OINT ointment, Apply topically 2 times daily Upper and inner thigh. Radha area  metoprolol succinate ER (TOPROL XL) 25 MG 24 hr tablet, Take 12.5 mg by mouth daily  Hold if SBP < 100  mineral oil-hydrophilic petrolatum (AQUAPHOR) external ointment, Apply topically At Bedtime Both legs  mirtazapine (REMERON) 15 MG tablet, Take 15 mg by mouth At Bedtime  Neomycin-Bacitracin-Polymyxin (TRIPLE ANTIBIOTIC) 3.5-400-5000 OINT ointment, Externally apply topically daily To wound  nystatin (MYCOSTATIN) 890441 UNIT/GM external powder, Apply topically 2 times daily Under breasts  polyethylene glycol (MIRALAX) 17 g packet, Take 1 packet by mouth daily  predniSONE (DELTASONE) 10 MG tablet, Take 10 mg by mouth daily  sennosides (SENOKOT) 8.6 MG tablet, Take 1 tablet by mouth 2 times daily  spironolactone (ALDACTONE) 25 MG tablet, Take 25 mg by mouth daily  umeclidinium-vilanterol (ANORO ELLIPTA) 62.5-25 MCG/INH oral inhaler, Inhale 1 puff into the lungs daily  Vitamin D3 (CHOLECALCIFEROL) 125 MCG (5000 UT) tablet, Take 5,000 Units by mouth daily     Surgical History  Reviewed, and She  has a past surgical history that includes Hysterectomy; hernia repair; Mastectomy; Biopsy breast; and PARTIAL HIP REPLACEMENT (Left, 1/13/2020).   Social History  Reviewed, and she  reports that she has quit smoking. She has quit using smokeless tobacco. She reports previous alcohol use. She reports previous drug use.Quit smoking 20 years ago.   Allergies  Allergies   Allergen Reactions     Blood-Group Specific Substance      Pneumococcal Vaccine      Prevnar 13 [Pneumococcal 13-Roseanna Conj Vacc]     Family History  Reviewed, and no pertinent.        Prior to Admission Medications   (Not in a hospital admission)       Review of Systems:    12 system review of systems negative except for what's noted in HPI         Physical Exam:  Temp:  [98.3  F (36.8  C)] 98.3  F (36.8  C)  Pulse:  [] 85  Resp:  [17-35] 31  BP: (109-169)/() 109/58  FiO2 (%):  [2 %-3 %] 3 %  SpO2:  [92 %-98 %] 95 %      General: Sitting up comfortably in bed.   HEENT: Conjunctivae are clear, nonicteric.  EOMI.  Neck: No masses  appreciated.  Respiratory: Speaking full sentences.  Upper airway noises loud and wheezy with some limited wheezes heard throughout rest of lung fields.    Cardiac: RRR. Brisk cap refill.  Warm well perfused all extremities. JVD difficult to assess given body habitus, but appears <5cm; no HJR.  Abdominal: Abdomen is obese, soft and non-tender.  Ventral hernia that is nontender and largely reducible.   Extremities: Bilateral lower extremities with 1+ pitting edema up to mid shins.  Skin: Superficial 1 x 1 cm wound over ventral hernia without discharge or surrounding erythema.  Bruising over bilateral shins.  Neurological: EOMI.  Moves all extremities.  Psychiatric: Demented but pleasant and able to answer limited questions.     Pertinent Labs  Lab Results: personally reviewed.   Results for orders placed or performed during the hospital encounter of 07/04/22 (from the past 24 hour(s))   XR Chest Port 1 View    Narrative    EXAM: XR CHEST PORT 1 VIEW  LOCATION: [facility]  DATE/TIME: 7/4/2022 6:38 PM    INDICATION: Wheeze, shortness of breath, cough. Evaluate for pneumonia.  COMPARISON: 01/12/2020      Impression    IMPRESSION:     Left chest port with catheter tip in the SVC. Surgical clips in the bilateral axillary regions.    Dense retrocardiac opacity, either atelectasis, infiltrate, and/or small pleural effusion.    Left midlung atelectasis/scarring, similar to prior.    No focal airspace disease in the right lung. No right pleural effusion. No pneumothorax.    Stable mild cardiomegaly. Aortic calcifications.   CBC with platelets differential    Narrative    The following orders were created for panel order CBC with platelets differential.  Procedure                               Abnormality         Status                     ---------                               -----------         ------                     CBC with platelets and d...[[id number]]  Abnormal            Final result                Manual Differential[[id number]]          Abnormal            Final result                 Please view results for these tests on the individual orders.   INR   Result Value Ref Range    INR 1.13 0.85 - 1.15   Comprehensive metabolic panel   Result Value Ref Range    Sodium 141 136 - 145 mmol/L    Potassium 4.2 3.5 - 5.0 mmol/L    Chloride 103 98 - 107 mmol/L    Carbon Dioxide (CO2) 27 22 - 31 mmol/L    Anion Gap 11 5 - 18 mmol/L    Urea Nitrogen 24 8 - 28 mg/dL    Creatinine 1.17 (H) 0.60 - 1.10 mg/dL    Calcium 9.7 8.5 - 10.5 mg/dL    Glucose 130 (H) 70 - 125 mg/dL    Alkaline Phosphatase 69 45 - 120 U/L    AST 12 0 - 40 U/L    ALT 14 0 - 45 U/L    Protein Total 6.2 6.0 - 8.0 g/dL    Albumin 3.6 3.5 - 5.0 g/dL    Bilirubin Total 0.3 0.0 - 1.0 mg/dL    GFR Estimate 44 (L) >60 mL/min/1.73m2   Lactic acid whole blood   Result Value Ref Range    Lactic Acid 2.8 (H) 0.7 - 2.0 mmol/L   Magnesium   Result Value Ref Range    Magnesium 2.2 1.8 - 2.6 mg/dL   Troponin I   Result Value Ref Range    Troponin I 0.01 0.00 - 0.29 ng/mL   B-Type Natriuretic Peptide ([facility] Only)   Result Value Ref Range     0 - 167 pg/mL   Procalcitonin   Result Value Ref Range    Procalcitonin 0.05 0.00 - 0.49 ng/mL   CBC with platelets and differential   Result Value Ref Range    WBC Count 27.9 (H) 4.0 - 11.0 10e3/uL    RBC Count 3.81 3.80 - 5.20 10e6/uL    Hemoglobin 12.3 11.7 - 15.7 g/dL    Hematocrit 39.2 35.0 - 47.0 %     (H) 78 - 100 fL    MCH 32.3 26.5 - 33.0 pg    MCHC 31.4 (L) 31.5 - 36.5 g/dL    RDW 13.9 10.0 - 15.0 %    Platelet Count 340 150 - 450 10e3/uL   D dimer quantitative   Result Value Ref Range    D-Dimer Quantitative 0.47 0.00 - 0.50 ug/mL FEU    Narrative    This D-dimer assay is intended for use in conjunction with a clinical pretest probability assessment model to exclude pulmonary embolism (PE) and deep venous thrombosis (DVT) in outpatients suspected of PE or DVT. The cut-off value is 0.50 ug/mL FEU.    Manual Differential   Result Value Ref Range    % Neutrophils 38 %    % Lymphocytes 56 %    % Monocytes 4 %    % Eosinophils 0 %    % Basophils 0 %    % Myelocytes 2 %    Absolute Neutrophils 10.6 (H) 1.6 - 8.3 10e3/uL    Absolute Lymphocytes 15.6 (H) 0.8 - 5.3 10e3/uL    Absolute Monocytes 1.1 0.0 - 1.3 10e3/uL    Absolute Eosinophils 0.0 0.0 - 0.7 10e3/uL    Absolute Basophils 0.0 0.0 - 0.2 10e3/uL    Absolute Myelocytes 0.6 (H) <=0.0 10e3/uL    RBC Morphology Confirmed RBC Indices     Platelet Assessment  Automated Count Confirmed. Platelet morphology is normal.     Automated Count Confirmed. Platelet morphology is normal.    Stomatocytes Slight (A) None Seen   CRP inflammation   Result Value Ref Range    CRP 0.8 (H) 0.0 - <0.8 mg/dL   Symptomatic; Unknown Influenza A/B & SARS-CoV2 (COVID-19) Virus PCR Multiplex Nose    Specimen: Nose; Swab   Result Value Ref Range    Influenza A PCR Negative Negative    Influenza B PCR Negative Negative    SARS CoV2 PCR Positive (A) Negative    Narrative    Testing was performed using the vivian SARS-CoV-2 & Influenza A/B Assay on the vivian Tish System. This test should be ordered for the detection of SARS-CoV-2 and influenza viruses in individuals who meet clinical and/or epidemiological criteria. Test performance is unknown in asymptomatic patients. This test is for in vitro diagnostic use under the FDA EUA for laboratories certified under CLIA to perform moderate and/or high complexity testing. This test has not been FDA cleared or approved. A negative result does not rule out the presence of PCR inhibitors in the specimen or target RNA in concentration below the limit of detection for the assay. If only one viral target is positive but coinfection with multiple targets is suspected, the sample should be re-tested with another FDA cleared, approved or authorized test, if coinfection would change clinical management. [facility] are certified under the  Clinical Laboratory Improvement Amendments of 1988 (CLIA-88) as  qualified to perform moderate and/or high complexity laboratory testing.   Blood gas arterial   Result Value Ref Range    pH Arterial 7.44 7.37 - 7.44    pCO2 Arterial 44 35 - 45 mm Hg    pO2 Arterial 47 (LL) 75 - 85 mm Hg    Bicarbonate Arterial 29 23 - 29 mmol/L    O2 Sat, Arterial 85.5 (L) 95.0 - 96.0 %    Oxyhemoglobin 84.3 (LL) 95.0 - 96.0 %    Base Excess/Deficit (+/-) 6.1   mmol/L    Sample Stabilized Temperature 37.0 degrees C   UA with Microscopic reflex to Culture    Specimen: Urine, Midstream   Result Value Ref Range    Color Urine Light Yellow Colorless, Straw, Light Yellow, Yellow    Appearance Urine Clear Clear    Glucose Urine Negative Negative mg/dL    Bilirubin Urine Negative Negative    Ketones Urine Negative Negative mg/dL    Specific Gravity Urine 1.019 1.001 - 1.030    Blood Urine Negative Negative    pH Urine 6.0 5.0 - 7.0    Protein Albumin Urine Negative Negative mg/dL    Urobilinogen Urine <2.0 <2.0 mg/dL    Nitrite Urine Negative Negative    Leukocyte Esterase Urine Negative Negative    RBC Urine 1 <=2 /HPF    WBC Urine <1 <=5 /HPF    Squamous Epithelials Urine 2 (H) <=1 /HPF    Hyaline Casts Urine 6 (H) <=2 /LPF    Narrative    Urine Culture not indicated   Lactic acid whole blood   Result Value Ref Range    Lactic Acid 3.9 (H) 0.7 - 2.0 mmol/L   INR   Result Value Ref Range    INR 1.10 0.85 - 1.15   D dimer quantitative   Result Value Ref Range    D-Dimer Quantitative 0.63 (H) 0.00 - 0.50 ug/mL FEU    Narrative    This D-dimer assay is intended for use in conjunction with a clinical pretest probability assessment model to exclude pulmonary embolism (PE) and deep venous thrombosis (DVT) in outpatients suspected of PE or DVT. The cut-off value is 0.50 ug/mL FEU.       Pertinent Radiology  Radiology Results: Personally reviewed image/s and personally reviewed impression/s  Results for orders placed or performed during the  hospital encounter of 07/04/22   XR Chest Port 1 View    Impression    IMPRESSION:     Left chest port with catheter tip in the SVC. Surgical clips in the bilateral axillary regions.    Dense retrocardiac opacity, either atelectasis, infiltrate, and/or small pleural effusion.    Left midlung atelectasis/scarring, similar to prior.    No focal airspace disease in the right lung. No right pleural effusion. No pneumothorax.    Stable mild cardiomegaly. Aortic calcifications.       EKG Results: NSR w/ PVCs      Precepted patient with Faculty in morning report    This note was created with the use of voice recognition dictation technology, and as such there may be unintended typographical or voice recognition errors that were not corrected during proofreading.    Miguel Brennan MD  [facility], PGY-2  Pager # [phone]

## 2022-07-05 NOTE — PROGRESS NOTES
RESPIRATORY CARE NOTE     Patient Name: Cha Smith  Today's Date: 7/5/2022       Pt continues to receive duo neb. BS are coarse exp wheezes. Pt is on 5lpm of oxygen via oxymask, SpO2 is 92%. Post treatment there is increased aeration. Pt also perceives improvement.  RT encouraged deep breathing and coughing techniques .  RT will continue to monitor and assess.

## 2022-07-05 NOTE — ED NOTES
EMERGENCY DEPARTMENT SIGN OUT NOTE        ED COURSE AND MEDICAL DECISION MAKING  89-year-old female with history of DVT, lung cancer, A. fib, COPD, and dementia who was initially admitted to [facility] for evaluation and treatment of both COVID pneumonia, bacterial pneumonia, and COPD exacerbation was unable to be transferred to that hospital because they did not have a one-to-one sitter.  Because the patient could not be admitted to [facility] she was admitted to [facility].    An EKG was obtained which revealed normal sinus rhythm without any new or concerning ST or T wave changes.  The patient has been given IV Rocephin, Decadron, and methylprednisolone, and remdesivir.  The patient reportedly no longer needed a one-to-one sitter prior to transferring to [facility] because she was given IV Haldol which help calm her down.  The patient was then admitted after the case was discussed with the resident physician Dr. Brennan.    Patient was signed out to me by Dr Carmen Mukherjee at 11:15 PM    In brief, Cha Smith is a 89 year old female who initially presented wit cough and shortness of breath.     At time of sign out, disposition was pending initially transfer, then patient was denied by facility due to not having a 1:1 for the patient.  [facility] paged.     12:03 AM I spoke with Dr. Brennan with [facility] about the patient.     FINAL IMPRESSION    1. COPD exacerbation (H)    2. Acute respiratory failure with hypoxia (H)    3. Pneumonia due to infectious organism, unspecified laterality, unspecified part of lung    4. Sepsis, due to unspecified organism, unspecified whether acute organ dysfunction present (H)    5. Lactic acidosis    6. Infection due to 2019 novel coronavirus    7. Delirium        ED MEDS  Medications   remdesivir 100 mg in sodium chloride 0.9 % 250 mL intermittent infusion (has no administration in time range)     And   0.9% sodium chloride BOLUS (has no administration in time  range)   dexamethasone PF (DECADRON) injection 6 mg (has no administration in time range)   methylPREDNISolone sodium succinate (solu-MEDROL) injection 125 mg (125 mg Intravenous Given 7/4/22 1849)   ipratropium - albuterol 0.5 mg/2.5 mg/3 mL (DUONEB) neb solution 3 mL (3 mLs Nebulization Given 7/4/22 1833)   cefTRIAXone (ROCEPHIN) 1 g vial to attach to  mL bag for ADULTS or NS 50 mL bag for PEDS (0 g Intravenous Stopped 7/4/22 2106)   azithromycin (ZITHROMAX) 500 mg in sodium chloride 0.9 % 250 mL intermittent infusion (0 mg Intravenous Stopped 7/4/22 2043)   haloperidol lactate (HALDOL) injection 2 mg (2 mg Intravenous Given 7/4/22 2042)   remdesivir 200 mg in sodium chloride 0.9 % 250 mL intermittent infusion (0 mg Intravenous Stopped 7/4/22 2222)     Followed by   0.9% sodium chloride BOLUS (0 mLs Intravenous Stopped 7/4/22 2256)       LAB  Labs Ordered and Resulted from Time of ED Arrival to Time of ED Departure   COMPREHENSIVE METABOLIC PANEL - Abnormal       Result Value    Sodium 141      Potassium 4.2      Chloride 103      Carbon Dioxide (CO2) 27      Anion Gap 11      Urea Nitrogen 24      Creatinine 1.17 (*)     Calcium 9.7      Glucose 130 (*)     Alkaline Phosphatase 69      AST 12      ALT 14      Protein Total 6.2      Albumin 3.6      Bilirubin Total 0.3      GFR Estimate 44 (*)    LACTIC ACID WHOLE BLOOD - Abnormal    Lactic Acid 2.8 (*)    ROUTINE UA WITH MICROSCOPIC REFLEX TO CULTURE - Abnormal    Color Urine Light Yellow      Appearance Urine Clear      Glucose Urine Negative      Bilirubin Urine Negative      Ketones Urine Negative      Specific Gravity Urine 1.019      Blood Urine Negative      pH Urine 6.0      Protein Albumin Urine Negative      Urobilinogen Urine <2.0      Nitrite Urine Negative      Leukocyte Esterase Urine Negative      RBC Urine 1      WBC Urine <1      Squamous Epithelials Urine 2 (*)     Hyaline Casts Urine 6 (*)    INFLUENZA A/B & SARS-COV2 PCR MULTIPLEX -  Abnormal    Influenza A PCR Negative      Influenza B PCR Negative      SARS CoV2 PCR Positive (*)    CBC WITH PLATELETS AND DIFFERENTIAL - Abnormal    WBC Count 27.9 (*)     RBC Count 3.81      Hemoglobin 12.3      Hematocrit 39.2       (*)     MCH 32.3      MCHC 31.4 (*)     RDW 13.9      Platelet Count 340     BLOOD GAS ARTERIAL - Abnormal    pH Arterial 7.44      pCO2 Arterial 44      pO2 Arterial 47 (*)     Bicarbonate Arterial 29      O2 Sat, Arterial 85.5 (*)     Oxyhemoglobin 84.3 (*)     Base Excess/Deficit (+/-) 6.1      Sample Stabilized Temperature 37.0     DIFFERENTIAL - Abnormal    % Neutrophils 38      % Lymphocytes 56      % Monocytes 4      % Eosinophils 0      % Basophils 0      % Myelocytes 2      Absolute Neutrophils 10.6 (*)     Absolute Lymphocytes 15.6 (*)     Absolute Monocytes 1.1      Absolute Eosinophils 0.0      Absolute Basophils 0.0      Absolute Myelocytes 0.6 (*)     RBC Morphology Confirmed RBC Indices      Platelet Assessment        Value: Automated Count Confirmed. Platelet morphology is normal.    Stomatocytes Slight (*)    INR - Normal    INR 1.13     MAGNESIUM - Normal    Magnesium 2.2     TROPONIN I - Normal    Troponin I 0.01     B-TYPE NATRIURETIC PEPTIDE ([facility] ONLY) - Normal         PROCALCITONIN - Normal    Procalcitonin 0.05     D DIMER QUANTITATIVE - Normal    D-Dimer Quantitative 0.47     LACTIC ACID WHOLE BLOOD   BLOOD CULTURE   BLOOD CULTURE       EKG  Normal sinus rhythm.  Rate of 95.  Normal QRS.  Normal QT.  Nonspecific ST and T wave changes.  Compared EKG on 1/13/2028 QT is less prolonged.  No other significant changes noted.    RADIOLOGY    XR Chest Port 1 View   Final Result   IMPRESSION:       Left chest port with catheter tip in the SVC. Surgical clips in the bilateral axillary regions.      Dense retrocardiac opacity, either atelectasis, infiltrate, and/or small pleural effusion.      Left midlung atelectasis/scarring, similar to prior.       No focal airspace disease in the right lung. No right pleural effusion. No pneumothorax.      Stable mild cardiomegaly. Aortic calcifications.          DISCHARGE MEDS  New Prescriptions    No medications on file       Dr. Shantel Nelson  [facility] EMERGENCY DEPARTMENT  [address] [place] [id number]  [phone]     Shantel Nelson DO  07/05/22 0050

## 2022-07-05 NOTE — PROGRESS NOTES
RESPIRATORY CARE NOTE     Patient Name: Cha Smith  Today's Date: 7/5/2022       Pt to receive duo neb. BS are forced coarse exp wheezes. Pt is on 3lpm of oxygen via oxymask, SpO2 is 97%. Post treatment there is no increased aeration. Pt also perceives improvement. MD notified.  RT will continue to monitor and assess.

## 2022-07-05 NOTE — ED NOTES
"[facility] ED Handoff Report    ED Chief Complaint: The patient comes from [facility]([phone]). With a cough and sob with a hx of COPD basline not on oxygen. Pt has a hx of Lung Cancer, a-fib, DVT, dementia, [place], large umbilical hernia inoperable at this time. Pt is covid postive.     ED Diagnosis:  (J44.1) COPD exacerbation (H)  Comment: Steroids, nebulizer treatments.  Plan: admission    (J96.01) Acute respiratory failure with hypoxia (H)  Comment: nebulizer treatments, oxygen via oxymask  Plan: admission    (J18.9) Pneumonia due to infectious organism, unspecified laterality, unspecified part of lung  Comment: IV abx administered  Plan: admission for monitoring.     (A41.9) Sepsis, due to unspecified organism, unspecified whether acute organ dysfunction present (H)  Comment: IV fluids given, I and O monitored.  Plan:  initially    (E87.2) Lactic acidosis  Comment:   Plan: IV bolus administered lactate recheck improved.     (U07.1) Infection due to 2019 novel coronavirus  Comment:   Plan: Resdesmevir intiated.    (R41.0) Delirium  Comment: Continue reorientation, pt forgetful, encourage reassurance, continues to ask to go home.  Plan: admission       PMH:    Past Medical History:   Diagnosis Date     A-fib (H)      Breast cancer (H)      CLL (chronic lymphocytic leukemia) (H)      COPD (chronic obstructive pulmonary disease) (H)      Dementia (H)      DVT (deep venous thrombosis) (H)      Hypertension      Lung cancer (H)         Code Status:  No CPR- Do NOT Intubate     Falls Risk: Yes Band: Applied    Current Living Situation/Residence: lives in an assisted living facility     Elimination Status: Continent: Yes , purewick in place, bumex given    Activity Level: 2 assist with a walker    Patients Preferred Language:  English     Needed: No    Vital Signs:  /63   Pulse 100   Temp 97.6  F (36.4  C) (Axillary)   Resp (!) 48   Ht 1.676 m (5' 6\")   Wt " "83.9 kg (185 lb)   SpO2 (!) 85%   BMI 29.86 kg/m       Cardiac Rhythm: a-fib    Pain Score: 0/10    Is the Patient Confused:  Yes    Last Food or Drink: 07/05/22 at breakfast     Focused Assessment:  The patient is forgetful, The pt appears agitated at this time, \"I just want to go home, Im fine I dont need to be here, there are no sports channels for me to watch. Pt up in the chair states she would like to stay in the chair at this time. Pt weak up with 2 and a walker.    Tests Performed: Done: Labs    Treatments Provided:  Neb treatments, IV antibiotics steroids.    Family Dynamics/Concerns: No    Family Updated On Visitor Policy: Yes , daughters notifed    Plan of Care Communicated to Family: Yes    Who Was Updated about Plan of Care: Terra Maldonado Checklist Done and Signed by Patient: No    Medications sent with patient: medications  From med room.     Covid: symptomatic, positive    Additional Information: none    RN: Cathy Galloway RN 7/5/2022 3:01 PM       "

## 2022-07-05 NOTE — ED NOTES
Pt is up in chair and denies pain. Wearing Oxymask on 3L O2. Respiration decreased to WDL and able to communicate without experiencing SOB.

## 2022-07-05 NOTE — ED NOTES
Pt oxygen sats reading 85% with good reading on Oxygen.  Turned O2 up to 6L without improvement.  Pt placed on oxymask at 2L with resulting sats of 93%

## 2022-07-05 NOTE — PROGRESS NOTES
[facility]    Progress Note - Hospitalist Service       Date of Admission:  7/4/2022    Cha Smith is a 89 year old female with PMH significant for COPD, a fib on apixaban, CKD, CLL, dementia, and lung cancer in sustained remission who presented to [facility] ED on 7/4 with multiple days of shortness of breath and cough. Requiring 2L NC and found to be covid positive with likely COPD exacerbation. On abx for concern for bacterial pneumonia.     Active Problems:    Lactic acidosis    Delirium    COPD exacerbation (H)    Acute respiratory failure with hypoxia (H)    Pneumonia due to infectious organism, unspecified laterality, unspecified part of lung    Sepsis, due to unspecified organism, unspecified whether acute organ dysfunction present (H)    Infection due to 2019 novel coronavirus     Acute hypoxic respiratory distress 2/2 covid infection  COPD, excacerbated  Increased SOB, wheezing, cough over last ~5 days. Covid + on admission and requiring 2L NC. Vaccinated x4 and no known sick contacts but lives in large assisted living facility. ABG with paO2 47 but otherwise fairly unremarkable. CXR with dense retrocardiac opacity, possibly infiltrate, but not clearly defined. Has hx of COPD, and feels like inhalers helps when she's taking them. Most suspect covid tipped her COPD over the edge as lacking typical infiltrate findings suggestive of simply gross covid pneumonia. May be component of superimposed bacterial pneumonia.  Patient quit smoking 20 years ago.  -Supplemental O2 to maintain spO2 > 90%  -Dexamethasone x10 doses (start 7/4); hold PTA prednisone 10mg daily  -Remidesivir x3 doses (start 7/4)  -Stop abx given neg procal  -Duoneub scheduled QID, Anoro ellipta scheduled 1 puff daily, abuterol neb prn TID  -Mucinex DM scheduled BID     Hyperlacticemia  CRP borderline elevated; procal normal. Leukocytosis to 27k with lymphocytic predominance (in setting of CLL) and lactic  elevated to 3.9. CXR significant for dense retrocardiac opacity, though unclear etiology. No evidence of sepsis at this time. May all just be secondary to covid w/ leukocytosis secondary to CLL, but particularly given lactic and overall mixed picture, treated for possible bacterial pneumonia overnight. Lactate down to 2.4 with 1L IVF. Likely remains elevated in the setting of receiving multiple albuterol containing nebs.  -Stop abx given neg procal  -Monitor vitals, especially BP  -CBC, CRP in AM  -Sputum cx; given in setting of covid  -F/u blood cx     Atrial fibrillation, on anticoagulation  NSR on admission. At home on metoprolol 12.5mg daily and apixaban 5mg BID.  -PTA apixaban  -PTA metoprolol     CKD 3B  eGFR 44, baseline 50-55.   -BMP in AM     CLL  WBC baseline 10.5-16.8, though data from 1 year ago. CLL likely the  of chronic leukocytosis. CLL stable per chart review of outpatient notes.  -CBC in AM     Dementia  Lives in [facility] in dementia unit. Has POLST on file w/ DNR-DNI. Was confused and rambunctious in ED but improved after haldol and ativan.  -Melatonin at night  -Will hold off on ordering further antipsychotics rick     HTN  -165/58-76. At home on metoprolol 12.5mg daily, aldactone 25mg daily. Patient not sure of home pressures.  -PTA metoprolol  -Hold PTA aldactone; resume if pressures remain normal overnight     Ventral hernia  Wound over abdomen  Large ventral hernia. Difficult to reduce sheerly given size, however not painful and patient denies any changes. Small 1x1cm shallow wound over ventral hernia.  -Basic nursing wound cares; consider WOC if wound not improving     Chronic B/L LE edema: Takes bumex 2g daily for this, no clearly defined etiology. Possibly some undiagnosed HF vs chronic venous stasis. Resume PTA bumex  Mental health: PTA citalopram 20mg daily, mirtazapine 15mg daily  History of lung cancer in remission: NTD  Hx of immune pneumonitis: In setting of cancer  "immunotherapy in past; on chronic 10mg prednisone. Hold PTA prednisone for now in setting of getting dexamethasone for covid.  Hx of EARL: PTA ferrous sulfate        Medication Reconciliation Status: Complete     FEN: PO fluids  DVT Prophylaxis: Apixaban  Code: DNR-DNI     Dispo: inpatient status for AHRF, anticipate discharge to prior facility   Barriers to discharge:  oxygen   Anticipated discharge date:  multiple more days        Disposition Plan         The patient's care was discussed with the Attending Physician, Dr. Graves.    Alverto Mcmahan MD  Hospitalist Service  [facility]  Securely message with the Vocera Web Console (learn more here)  Text page via [employer] Paging/Directory         Clinically Significant Risk Factors Present on Admission               # Coagulation Defect: home medication list includes an anticoagulant medication      # Overweight: Estimated body mass index is 29.86 kg/m  as calculated from the following:    Height as of this encounter: 1.676 m (5' 6\").    Weight as of this encounter: 83.9 kg (185 lb).        ______________________________________________________________________    Interval History   Feels fine this morning. Doesn't feel like breathing is too labored. IS surprised she's up to 5L - thinks she doesn't need that much. Vaccinated and boosted x 2.    Data reviewed today: I reviewed all medications, new labs and imaging results over the last 24 hours. I personally reviewed no images or EKG's today.    Physical Exam   Vital Signs: Temp: 97.6  F (36.4  C) Temp src: Axillary BP: 116/57 Pulse: 91   Resp: 29 SpO2: 95 % O2 Device: Simple face mask    Weight: 185 lbs 0 oz  General: Sitting up comfortably in bed.   HEENT: Conjunctivae are clear, nonicteric.  EOMI.  Neck: No masses appreciated.  Respiratory: Speaking full sentences.  Upper airway noises loud and wheezy with some limited wheezes heard throughout rest of lung fields.    Cardiac: RRR. Brisk " cap refill.  Warm well perfused all extremities. JVD difficult to assess given body habitus, but appears <5cm; no HJR.  Abdominal: Abdomen is obese, soft and non-tender.  Ventral hernia that is nontender and largely reducible.   Extremities: Bilateral lower extremities with 1+ pitting edema up to mid shins.  Skin: Superficial 1 x 1 cm wound over ventral hernia without discharge or surrounding erythema.  Bruising over bilateral shins.  Neurological: EOMI.  Moves all extremities.  Psychiatric: Demented but pleasant and able to answer limited questions.    Data   Recent Results (from the past 24 hour(s))   XR Chest Port 1 View    Narrative    EXAM: XR CHEST PORT 1 VIEW  LOCATION: [facility]  DATE/TIME: 7/4/2022 6:38 PM    INDICATION: Wheeze, shortness of breath, cough. Evaluate for pneumonia.  COMPARISON: 01/12/2020      Impression    IMPRESSION:     Left chest port with catheter tip in the SVC. Surgical clips in the bilateral axillary regions.    Dense retrocardiac opacity, either atelectasis, infiltrate, and/or small pleural effusion.    Left midlung atelectasis/scarring, similar to prior.    No focal airspace disease in the right lung. No right pleural effusion. No pneumothorax.    Stable mild cardiomegaly. Aortic calcifications.

## 2022-07-06 NOTE — PLAN OF CARE
Goal Outcome Evaluation:  1800... Pt admitted from ED with Lactic acidosis, delirium, COPD exacerbation and acute respiratory failure with hypoxia. Pt is a/o x2 with some confusion and forgetfulness. Pt is Covid (+) and has been placed on special precaution. Assist of 1 with walker and gait belt. Expiratory wheezes, congested cough and use of abdominal muscle with breathing. She received scheduled duo neb, 02 at 2L/NC with sat at 97%. Pt had a large umbilical hernia, multiple bruises, discoloration and abrasion/scratches on upper and lower extremities. Pt ate 100% for dinner, will continue to monitor.

## 2022-07-06 NOTE — PLAN OF CARE
"  Problem: Plan of Care - These are the overarching goals to be used throughout the patient stay.    Goal: Patient-Specific Goal (Individualized)  Description: You can add care plan individualizations to a care plan. Examples of Individualization might be:  \"Parent requests to be called daily at 9am for status\", \"I have a hard time hearing out of my right ear\", or \"Do not touch me to wake me up as it startles me\".  Outcome: Ongoing, Progressing     Problem: Plan of Care - These are the overarching goals to be used throughout the patient stay.    Goal: Optimal Comfort and Wellbeing  Outcome: Ongoing, Progressing     Problem: Risk for Delirium  Goal: Improved Behavioral Control  Outcome: Ongoing, Progressing    A/O x2. VSS. Sat 96% 3L oxygen per NC. Denied pain. Was very agitated at the beginning of AM shift. Started on 1:1 bedside sitter. Improved later on. Incontinent of B&B. Assist of one person with ADLs.                          "

## 2022-07-06 NOTE — PLAN OF CARE
Problem: Risk for Delirium  Goal: Improved Attention and Thought Clarity  7/6/2022 1439 by Hamzah Cheng RN  Outcome: Ongoing, Progressing  7/6/2022 1433 by Hamzah Cheng RN  Outcome: Ongoing, Progressing     Problem: Fall Injury Risk  Goal: Absence of Fall and Fall-Related Injury  Outcome: Ongoing, Progressing  Intervention: Identify and Manage Contributors  Recent Flowsheet Documentation  Taken 7/6/2022 1330 by Hamzah Cheng RN  Medication Review/Management: medications reviewed  Taken 7/6/2022 1000 by Hamzah Cheng RN  Medication Review/Management: medications reviewed  Intervention: Promote Injury-Free Environment  Recent Flowsheet Documentation  Taken 7/6/2022 1330 by Hamzah Cheng RN  Safety Promotion/Fall Prevention:   bed alarm on   clutter free environment maintained   nonskid shoes/slippers when out of bed   sitter at bedside   safety round/check completed  Taken 7/6/2022 1000 by Hamzah Cheng RN  Safety Promotion/Fall Prevention:   bed alarm on   clutter free environment maintained   nonskid shoes/slippers when out of bed   sitter at bedside   safety round/check completed     Problem: Fall Injury Risk  Goal: Absence of Fall and Fall-Related Injury  Intervention: Promote Injury-Free Environment  Recent Flowsheet Documentation  Taken 7/6/2022 1330 by Hamzah Cheng RN  Safety Promotion/Fall Prevention:   bed alarm on   clutter free environment maintained   nonskid shoes/slippers when out of bed   sitter at bedside   safety round/check completed  Taken 7/6/2022 1000 by Hamzah Cheng RN  Safety Promotion/Fall Prevention:   bed alarm on   clutter free environment maintained   nonskid shoes/slippers when out of bed   sitter at bedside   safety round/check completed     Problem: Breathing Pattern Ineffective  Goal: Effective Breathing Pattern  Outcome: Ongoing, Progressing   Goal Outcome Evaluation:      Patient was very anxious and trying to get out of bed x 3 this am.  Unsteady  on feet and did not respond to direction of using call light.  1:1 sitter for safety.  Has been calm and cooperative this afternoon.  Alert with some confusion present.      Pt does have shortness of breath with exertion.  Tachypneic at times.  Lung sounds coarse with expiratory wheezes present.  Oxygen saturations remain in the 90s on 2 liters nasal canula.  Encouraged to cough and deep breath. Was up in chair with assist of one. No complaints of pain.

## 2022-07-06 NOTE — PROGRESS NOTES
[facility]    Progress Note - Hospitalist Service       Date of Admission:  7/4/2022    Cha Smith is a 89 year old female with PMH significant for COPD, a fib on apixaban, CKD, CLL, dementia, and lung cancer in sustained remission who presented to [facility] ED on 7/4 with multiple days of shortness of breath and cough. Requiring 2L NC and found to be covid positive with likely COPD exacerbation. On abx for concern for bacterial pneumonia.     Active Problems:    Lactic acidosis    Delirium    COPD exacerbation (H)    Acute respiratory failure with hypoxia (H)    Pneumonia due to infectious organism, unspecified laterality, unspecified part of lung    Sepsis, due to unspecified organism, unspecified whether acute organ dysfunction present (H)    Infection due to 2019 novel coronavirus     Acute hypoxic respiratory distress 2/2 covid infection  COPD, excacerbated  Increased SOB, wheezing, cough over last ~5 days. Covid + on admission and requiring 2L NC. Vaccinated x4 and no known sick contacts but lives in large assisted living facility. ABG with paO2 47 but otherwise fairly unremarkable. CXR with dense retrocardiac opacity, possibly infiltrate, but not clearly defined. Has hx of COPD, and feels like inhalers helps when she's taking them. Most suspect covid tipped her COPD over the edge as lacking typical infiltrate findings suggestive of simply gross covid pneumonia. May be component of superimposed bacterial pneumonia.  Patient quit smoking 20 years ago.  -Supplemental O2 to maintain spO2 > 90%; down to 2L this AM  -Dexamethasone x10 doses (start 7/4); hold PTA prednisone 10mg daily  -Remidesivir x3 doses (start 7/4)  -Stop abx given neg procal  -Duoneub scheduled QID, Anoro ellipta scheduled 1 puff daily, abuterol neb prn TID  -Mucinex DM scheduled BID     Hyperlacticemia  CRP borderline elevated; procal normal. Leukocytosis to 27k with lymphocytic predominance (in setting of  CLL) and lactic elevated to 3.9. CXR significant for dense retrocardiac opacity, though unclear etiology. No evidence of sepsis at this time. May all just be secondary to covid w/ leukocytosis secondary to CLL, but particularly given lactic and overall mixed picture, treated for possible bacterial pneumonia overnight. Lactate down to 2.4 with 1L IVF. Likely remains elevated in the setting of receiving multiple albuterol containing nebs.  -Stop abx given neg procal  -Monitor vitals, especially BP  -CBC, CRP in AM  -Sputum cx  -F/u blood cx     Atrial fibrillation, on anticoagulation  NSR on admission. At home on metoprolol 12.5mg daily and apixaban 5mg BID.  -PTA apixaban  -PTA metoprolol     CKD 3B  eGFR 44, baseline 50-55.   -BMP in AM     CLL  WBC baseline 10.5-16.8, though data from 1 year ago. CLL likely the  of chronic leukocytosis. CLL stable per chart review of outpatient notes.  -CBC in AM     Dementia  Lives in [facility] in dementia unit. Has POLST on file w/ DNR-DNI. Was confused and rambunctious in ED but improved after haldol and ativan.  -Melatonin at night  -Will hold off on ordering further antipsychotics rick     HTN  -165/58-76. At home on metoprolol 12.5mg daily, aldactone 25mg daily. Patient not sure of home pressures.  -PTA metoprolol  -Restart PTA aldactone     Ventral hernia  Wound over abdomen  Large ventral hernia. Difficult to reduce sheerly given size, however not painful and patient denies any changes. Small 1x1cm shallow wound over ventral hernia.  -Basic nursing wound cares; consider WOC if wound not improving     Chronic B/L LE edema: Takes bumex 2g daily for this, no clearly defined etiology. Possibly some undiagnosed HF vs chronic venous stasis. Resume PTA bumex  Mental health: PTA citalopram 20mg daily, mirtazapine 15mg daily  History of lung cancer in remission: NTD  Hx of immune pneumonitis: In setting of cancer immunotherapy in past; on chronic 10mg prednisone. Hold  "PTA prednisone for now in setting of getting dexamethasone for covid.  Hx of EARL: PTA ferrous sulfate        Medication Reconciliation Status: Complete     FEN: PO fluids  DVT Prophylaxis: Apixaban  Code: DNR-DNI     Dispo: inpatient status for AHRF, anticipate discharge to prior facility   Barriers to discharge:  oxygen   Anticipated discharge date:  multiple more days        Disposition Plan         The patient's care was discussed with the Attending Physician, Dr. Graves.    Alverto Mcmahan MD  Hospitalist Service  [facility]  Securely message with the Vocera Web Console (learn more here)  Text page via [employer] Paging/Directory         Clinically Significant Risk Factors Present on Admission                # Overweight: Estimated body mass index is 29.86 kg/m  as calculated from the following:    Height as of this encounter: 1.676 m (5' 6\").    Weight as of this encounter: 83.9 kg (185 lb).        ______________________________________________________________________    Interval History   Feels well this morning, asking when she can go home. Currently on 2L. Needing 1:1 for trying to get out of bed repeatedly.    Data reviewed today: I reviewed all medications, new labs and imaging results over the last 24 hours. I personally reviewed no images or EKG's today.    Physical Exam   Vital Signs: Temp: 98  F (36.7  C) Temp src: Oral BP: (!) 165/77 Pulse: 89   Resp: 22 SpO2: 98 % O2 Device: Nasal cannula Oxygen Delivery: 3 LPM  Weight: 185 lbs 0 oz  General: Sitting up comfortably in bed.   HEENT: Conjunctivae are clear, nonicteric.  EOMI.  Neck: No masses appreciated.  Respiratory: Speaking full sentences.  Upper airway noises loud and wheezy with some limited wheezes heard throughout rest of lung fields.    Cardiac: RRR. Brisk cap refill.  Warm well perfused all extremities. JVD difficult to assess given body habitus, but appears <5cm; no HJR.  Abdominal: Abdomen is obese, soft and " non-tender.  Ventral hernia that is nontender and largely reducible.   Extremities: Bilateral lower extremities with 1+ pitting edema up to mid shins.  Skin: Superficial 1 x 1 cm wound over ventral hernia without discharge or surrounding erythema.  Bruising over bilateral shins.  Neurological: EOMI.  Moves all extremities.  Psychiatric: Demented but pleasant and able to answer limited questions.    Data   No results found for this or any previous visit (from the past 24 hour(s)).

## 2022-07-06 NOTE — PROGRESS NOTES
Care Management Follow Up    Length of Stay (days): 1    Expected Discharge Date: 07/09/2022     Concerns to be Addressed: covid care, pulm status      Patient plan of care discussed at interdisciplinary rounds: Yes    Anticipated Discharge Disposition:  TBD     Anticipated Discharge Services:  TBD  Anticipated Discharge DME:  TBD         Additional Information:  Patient covid positive. Currently on O2 2L. On 1:1 for safety.    Social History:  Patient lives at [facility]. Will need [facility] Transportation. Daughter Stephenie is primary family contact.     Final discharge plan pending progression and recommendations.    Carolyn Rivera RN

## 2022-07-06 NOTE — PLAN OF CARE
Problem: Risk for Delirium  Goal: Optimal Coping  Outcome: Ongoing, Progressing     Problem: Plan of Care - These are the overarching goals to be used throughout the patient stay.    Goal: Absence of Hospital-Acquired Illness or Injury  Intervention: Prevent and Manage VTE (Venous Thromboembolism) Risk  Recent Flowsheet Documentation  Taken 7/6/2022 0150 by Nahed Judd RN  Activity Management: activity adjusted per tolerance  Patient alert, with confusion. Noted restless and fidgeting in the room. Denied discomfort. Dyspnea on exertion. Administered medications ok. Saturation 88-97% 3 LPM.  Purewick in place. Will continue to monitor the patient

## 2022-07-07 NOTE — PROGRESS NOTES
07/07/22 0925   Quick Adds   Quick Adds Certification   Type of Visit Initial PT Evaluation   Living Environment   People in Home alone   Current Living Arrangements assisted living   Home Accessibility stairs to enter home   Number of Stairs, Main Entrance 6   Stair Railings, Main Entrance railings safe and in good condition;railings on both sides of stairs   Transportation Anticipated family or friend will provide   Living Environment Comments Lives in an assisted living facility, patient reports that staff does not assist with mobility, but does assist with food and some ADL activities. Patient reports 6 stairs to enter home, but then later states that the facility is one level and she does not have to navigate stairs.   Self-Care   Usual Activity Tolerance moderate   Current Activity Tolerance fair   Equipment Currently Used at Home walker, rolling  (4WW)   Fall history within last six months no   General Information   Onset of Illness/Injury or Date of Surgery 07/04/22   Referring Physician Alverto Mcmahan MD   Patient/Family Therapy Goals Statement (PT) To go home   Pertinent History of Current Problem (include personal factors and/or comorbidities that impact the POC) Cha Smith is a 89 year old female with PMH significant for COPD, a fib on apixaban, CKD, CLL, dementia, and lung cancer in sustained remission who presented to [facility] ED on 7/4 with multiple days of shortness of breath and cough. Requiring 2L NC and found to be covid positive with likely COPD exacerbation   Existing Precautions/Restrictions fall   Cognition   Affect/Mental Status (Cognition) WNL   Orientation Status (Cognition) oriented to;person;place;situation;disoriented to;time   Follows Commands (Cognition) follows one-step commands;50-74% accuracy   Cognitive Status Comments Patient able to correctly state her name, date of birth, her location and reason for admission; unable to correctly state the month and day, states  "\"October 22\", correctly states the year and the most recent holiday celebrated.   Range of Motion (ROM)   Range of Motion ROM is WFL   Strength (Manual Muscle Testing)   Strength (Manual Muscle Testing) strength is WFL   Bed Mobility   Bed Mobility scooting/bridging   Scooting/Bridging [place] (Bed Mobility) verbal cues;supervision   Transfers   Transfers sit-stand transfer   Transfer Safety Concerns Noted decreased balance during turns;losing balance backward   Impairments Contributing to Impaired Transfers impaired balance   Sit-Stand Transfer   Sit-Stand [place] (Transfers) 1 person to manage equipment   Assistive Device (Sit-Stand Transfers) walker, front-wheeled   Comment, (Sit-Stand Transfer) min - CGA x 1, instability noted upon standing with occasional losses of balance backwards   Gait/Stairs (Locomotion)   [place] Level (Gait) contact guard;verbal cues;1 person to manage equipment   Assistive Device (Gait) walker, front-wheeled   Distance in Feet (Required for LE Total Joints) 10   Pattern (Gait) swing-through   Deviations/Abnormal Patterns (Gait) base of support, wide;festinating/shuffling   Sensory Examination   Sensory Perception Comments Light touch intact BLE   Clinical Impression   Criteria for Skilled Therapeutic Intervention Yes, treatment indicated   PT Diagnosis (PT) Impaired functional mobility   Influenced by the following impairments Decreased activity tolerance   Functional limitations due to impairments Gait, stairs, transfers   Clinical Presentation (PT Evaluation Complexity) Evolving/Changing   Clinical Presentation Rationale Presents as medically diagnosed   Clinical Decision Making (Complexity) moderate complexity   Planned Therapy Interventions (PT) balance training;bed mobility training;gait training;patient/family education;stair training;strengthening;transfer training   Anticipated Equipment Needs at Discharge (PT) walker, rolling   Risk & Benefits of therapy have " been explained evaluation/treatment results reviewed;care plan/treatment goals reviewed;participants voiced agreement with care plan;participants included;patient   PT Discharge Planning   PT Discharge Recommendation (DC Rec) home with assist   PT Rationale for DC Rec Patient currently requiring assist of one during functional mobility for safety, patient lives in an assisted living facility and has access to assistance.   Therapy Certification   Start of care date 07/07/22   Certification date from 07/07/22   Certification date to 07/14/22   Medical Diagnosis COPD exacerbation   Total Evaluation Time   Total Evaluation Time (Minutes) 10   Physical Therapy Goals   PT Frequency Daily   PT Predicted Duration/Target Date for Goal Attainment 07/14/22   PT Goals Transfers;Gait   PT: Transfers Supervision/stand-by assist;Sit to/from stand;Assistive device   PT: Gait Supervision/stand-by assist;Rolling walker;Greater than 200 feet     Sheila Jose DPT

## 2022-07-07 NOTE — PROGRESS NOTES
RESPIRATORY CARE NOTE     Patient Name: Cha Smith  Today's Date: 7/7/2022       Pt continues to receive Duo nebs QID. BS are exp wheezes/ diminished to clear and diminished after neb. Pt is on 2.5 Lof oxygen via NC, SpO2 is 95-96%. Post treatment there is increased aeration. Pt also perceives improvement.  RT encouraged deep breathing and coughing techniques .  RT will continue to monitor and assess.   Amirah Moore, RT

## 2022-07-07 NOTE — PROGRESS NOTES
Care Management Follow Up    Length of Stay (days): 2    Expected Discharge Date: 07/09/2022     Concerns to be Addressed: covid care, pulm status        Patient plan of care discussed at interdisciplinary rounds: Yes     Anticipated Discharge Disposition:  memory care     Anticipated Discharge Services:  memory care  Anticipated Discharge DME:  TBD           Additional Information:  Patient covid positive. Currently on O2 2L. On 1:1 for safety.     Social History:  Patient lives at [facility] [phone], fax [phone]. She receives assist with bathing, dressing, meals, medications and toilet reminders. Has Interim RN. She is independent with toileting and ambulates with a walker. Will need [facility] Transportation. Daughter Stephenie is primary family contact.     Kimberly Carcamo RN  [phone]. Last update 7/7/22.     Updated memory care nurse Esha [phone]. She provided verification of baseline status. She states patient would need to quarantine 10 days upon return to memory care.  For weekend admissions call main line.  There is an on-call nurse available.         Carolyn Rivera RN

## 2022-07-07 NOTE — PLAN OF CARE
Problem: Risk for Delirium  Goal: Improved Behavioral Control  Outcome: Ongoing, Progressing  Goal: Improved Attention and Thought Clarity  Outcome: Ongoing, Progressing     Problem: Fall Injury Risk  Goal: Absence of Fall and Fall-Related Injury  Outcome: Ongoing, Progressing  Intervention: Identify and Manage Contributors  Recent Flowsheet Documentation  Taken 7/7/2022 1740 by Hamzah Cheng RN  Medication Review/Management: medications reviewed  Taken 7/7/2022 1000 by Hamzah Cheng RN  Medication Review/Management: medications reviewed  Intervention: Promote Injury-Free Environment  Recent Flowsheet Documentation  Taken 7/7/2022 1740 by Hamzah Cheng RN  Safety Promotion/Fall Prevention:   activity supervised   bed alarm on   chair alarm on   bedside attendant   clutter free environment maintained   fall prevention program maintained   mobility aid in reach   nonskid shoes/slippers when out of bed  Taken 7/7/2022 1000 by Hamzah Cheng RN  Safety Promotion/Fall Prevention:   activity supervised   bed alarm on   chair alarm on   bedside attendant   clutter free environment maintained   fall prevention program maintained   mobility aid in reach   nonskid shoes/slippers when out of bed     Problem: Gas Exchange Impaired  Goal: Optimal Gas Exchange  Outcome: Ongoing, Progressing   Goal Outcome Evaluation:    Patient is now sating 92% on room air.  Cough infrequent.  Lungs sounds with some expiratory wheezing but improved from morning.      Late morning became very agitated, angry and anxious.  Stating she feels just fine and needs to go home.  Standing up and not sitting back.  Yelling at nursing assistant 1:1. Would not respond to any redirection.  Trying to call for help.  Obtained and order for prn haldol.  2 mg haldol given and helpful.  Pt is calm and cooperative at this time.  Remains on 1:1.

## 2022-07-07 NOTE — PLAN OF CARE
Problem: Breathing Pattern Ineffective  Goal: Effective Breathing Pattern  Outcome: Ongoing, Progressing     Problem: Gas Exchange Impaired  Goal: Optimal Gas Exchange  Outcome: Ongoing, Progressing   Goal Outcome Evaluation:                  Amirah Moore, RT

## 2022-07-07 NOTE — PLAN OF CARE
"  Problem: Risk for Delirium  Goal: Improved Attention and Thought Clarity  Outcome: Ongoing, Progressing    Problem: Gas Exchange Impaired  Goal: Optimal Gas Exchange  Outcome: Ongoing, Progressing   Goal Outcome Evaluation:      Alert and oriented x 4 at this time, able to make her needs know. Unable to sleep overnight, patient refused sleeping aid and stated \"I will sleep when I go home\".    Afebrile. VSS.     Nasal congestion, humidified O2 applied which helped.   O2 SATs wdl with NC 2.5 L.   Lungs with expiratory wheezes.   Denies shortness of breath at rest. Dyspnea with exertion per her report.    Non productive cough. Unable to collect sputum sample tonight. Left sticky note for morning team.    Purewick with good amount of UO.     1:1 video monitoring for safety.                 "

## 2022-07-07 NOTE — PROGRESS NOTES
"BP (!) 160/71 (BP Location: Left arm)   Pulse 90   Temp 98.3  F (36.8  C) (Oral)   Resp 20   Ht 1.676 m (5' 6\")   Wt 83.9 kg (185 lb)   SpO2 96%   BMI 29.86 kg/m      Pt was on 2.5 lpm NC when last seen. Pt had exp whz throughout pre and post neb. Pt had a non productive cough. RT will continue to follow.   "

## 2022-07-07 NOTE — PROGRESS NOTES
[facility]    Progress Note - Hospitalist Service       Date of Admission:  7/4/2022    Cha Smith is a 89 year old female with PMH significant for COPD, a fib on apixaban, CKD, CLL, dementia, and lung cancer in sustained remission who presented to [facility] ED on 7/4 with multiple days of shortness of breath and cough. Requiring 2L NC and found to be covid positive with likely COPD exacerbation. On abx for concern for bacterial pneumonia.     Active Problems:    Lactic acidosis    Delirium    COPD exacerbation (H)    Acute respiratory failure with hypoxia (H)    Pneumonia due to infectious organism, unspecified laterality, unspecified part of lung    Sepsis, due to unspecified organism, unspecified whether acute organ dysfunction present (H)    Infection due to 2019 novel coronavirus     Acute hypoxic respiratory distress 2/2 covid infection  COPD, excacerbated  Increased SOB, wheezing, cough over last ~5 days. Covid + on admission and requiring 2L NC. Vaccinated x4 and no known sick contacts but lives in large assisted living facility. ABG with paO2 47 but otherwise fairly unremarkable. CXR with dense retrocardiac opacity, possibly infiltrate, but not clearly defined. Has hx of COPD, and feels like inhalers helps when she's taking them. Most suspect covid tipped her COPD over the edge as lacking typical infiltrate findings suggestive of simply gross covid pneumonia. May be component of superimposed bacterial pneumonia.  Patient quit smoking 20 years ago.  -Supplemental O2 to maintain spO2 > 90%; working on weaning today  -Dexamethasone x10 doses (start 7/4); hold PTA prednisone 10mg daily  -Remidesivir x3 doses (start 7/4)  -Stop abx given neg procal  -Duoneub scheduled QID, Anoro ellipta scheduled 1 puff daily, abuterol neb prn TID  -Mucinex DM scheduled BID     Hyperlacticemia  CRP borderline elevated; procal normal. Leukocytosis to 27k with lymphocytic predominance (in setting  of CLL) and lactic elevated to 3.9. CXR significant for dense retrocardiac opacity, though unclear etiology. No evidence of sepsis at this time. May all just be secondary to covid w/ leukocytosis secondary to CLL, but particularly given lactic and overall mixed picture, treated for possible bacterial pneumonia overnight. Lactate down to 2.4 with 1L IVF. Likely remains elevated in the setting of receiving multiple albuterol containing nebs.  -Stop abx given neg procal  -Monitor vitals, especially BP  -CBC, CRP daily  -F/u blood cx     Atrial fibrillation, on anticoagulation  NSR on admission. At home on metoprolol 12.5mg daily and apixaban 5mg BID.  -PTA apixaban  -PTA metoprolol     CKD 3B  eGFR 44, baseline 50-55.   -BMP in AM     CLL  WBC baseline 10.5-16.8, though data from 1 year ago. CLL likely the  of chronic leukocytosis. CLL stable per chart review of outpatient notes.  -CBC in AM     Dementia  Hospital acquired delirium  Lives in [facility] in dementia unit. Has POLST on file w/ DNR-DNI. Was confused and rambunctious in ED but improved after haldol and ativan.  -Melatonin at night  -Redirect as able, frequent orientation  -Haldol prn     HTN  -165/58-76. At home on metoprolol 12.5mg daily, aldactone 25mg daily. Patient not sure of home pressures.  -PTA metoprolol  -Restart PTA aldactone     Ventral hernia  Wound over abdomen  Large ventral hernia. Difficult to reduce sheerly given size, however not painful and patient denies any changes. Small 1x1cm shallow wound over ventral hernia.  -Basic nursing wound cares; consider WOC if wound not improving     Chronic B/L LE edema: Takes bumex 2g daily for this, no clearly defined etiology. Possibly some undiagnosed HF vs chronic venous stasis. Resume PTA bumex  Mental health: PTA citalopram 20mg daily, mirtazapine 15mg daily  History of lung cancer in remission: NTD  Hx of immune pneumonitis: In setting of cancer immunotherapy in past; on chronic  "10mg prednisone. Hold PTA prednisone for now in setting of getting dexamethasone for covid.  Hx of EARL: PTA ferrous sulfate        Medication Reconciliation Status: Complete     FEN: PO fluids  DVT Prophylaxis: Apixaban  Code: DNR-DNI     Dispo: inpatient status for AHRF, anticipate discharge to prior facility   Barriers to discharge:  oxygen   Anticipated discharge date:  multiple more days        Disposition Plan     Expected Discharge Date: 07/09/2022    Discharge Delays: OT Disposition recs needed  PT Disposition recs needed    Discharge Comments: covid positive from memory care      The patient's care was discussed with the Attending Physician, Dr. Graves.    Alverto Mcmahan MD  Hospitalist Service  [facility]  Securely message with the Vocera Web Console (learn more here)  Text page via [employer] Paging[employer]         Clinically Significant Risk Factors Present on Admission                # Overweight: Estimated body mass index is 29.86 kg/m  as calculated from the following:    Height as of this encounter: 1.676 m (5' 6\").    Weight as of this encounter: 83.9 kg (185 lb).        ______________________________________________________________________    Interval History   Wants to get out of the hospital today.  States she is not sick.  Quite agitated with her nurse throughout the day.  O2 needs down today.    Data reviewed today: I reviewed all medications, new labs and imaging results over the last 24 hours. I personally reviewed no images or EKG's today.    Physical Exam   Vital Signs: Temp: 98.3  F (36.8  C) Temp src: Oral BP: (!) 160/71 Pulse: 90   Resp: 20 SpO2: 95 % O2 Device: Nasal cannula Oxygen Delivery: 2.5 LPM  Weight: 185 lbs 0 oz  General: Sitting up comfortably in bed.   HEENT: Conjunctivae are clear, nonicteric.  EOMI.  Neck: No masses appreciated.  Respiratory: Speaking full sentences.  Upper airway noises loud and wheezy with some limited wheezes heard throughout " rest of lung fields.    Cardiac: RRR. Brisk cap refill.  Warm well perfused all extremities. JVD difficult to assess given body habitus, but appears <5cm; no HJR.  Abdominal: Abdomen is obese, soft and non-tender.  Ventral hernia that is nontender and largely reducible.   Extremities: Bilateral lower extremities with 1+ pitting edema up to mid shins.  Skin: Superficial 1 x 1 cm wound over ventral hernia without discharge or surrounding erythema.  Bruising over bilateral shins.  Neurological: EOMI.  Moves all extremities.  Psychiatric: Demented but pleasant and able to answer limited questions.    Data   No results found for this or any previous visit (from the past 24 hour(s)).

## 2022-07-07 NOTE — PROGRESS NOTES
"SpO2 95% on 3 lpm NC. Patient received Duoneb as schedule, BS diminished with mild expiratory wheeze, has strong non-productive cough.     BP (!) 142/66 (BP Location: Right arm)   Pulse 89   Temp 98.4  F (36.9  C) (Oral)   Resp 20   Ht 1.676 m (5' 6\")   Wt 83.9 kg (185 lb)   SpO2 95%   BMI 29.86 kg/m    "

## 2022-07-07 NOTE — PLAN OF CARE
[facility]      OUTPATIENT PHYSICAL THERAPY EVALUATION  PLAN OF TREATMENT FOR OUTPATIENT REHABILITATION  (COMPLETE FOR INITIAL CLAIMS ONLY)  Patient's Last Name, First Name, M.I.  YOB: 1933  Rosalind Smithjuan DIANA                        Provider's Name  [facility] Medical Record No.  [id number]                               Onset Date:  07/04/22   Start of Care Date:         Type:     _X_PT   ___OT   ___SLP Medical Diagnosis:                           PT Diagnosis:      Visits from SOC:  1   _________________________________________________________________________________  Plan of Treatment/Functional Goals    Planned Interventions:       Goals: See Physical Therapy Goals on Care Plan in [facility] electronic health record.    Therapy Frequency:    Predicted Duration of Therapy Intervention:    _________________________________________________________________________________    I CERTIFY THE NEED FOR THESE SERVICES FURNISHED UNDER        THIS PLAN OF TREATMENT AND WHILE UNDER MY CARE     (Physician co-signature of this document indicates review and certification of the therapy plan).               ,      Referring Physician: Alverto Mcmahan MD            Initial Assessment        See Physical Therapy evaluation dated   in Epic electronic health record.

## 2022-07-08 NOTE — PROGRESS NOTES
RESPIRATORY CARE NOTE    Patient Name: Cha Smith  Today's date: 7/8/2022    Pt on 1lnc in am 21% in pm,  with spo2 90-92%, bs expiratory wheezes, strong congested non productive cough. Received duoneb via face mask x 3  Bs faint expiratory wheezes, better aeration after neb. Did deep breathing exercise x 5.x3  RT will cont monitoring and assess.    Claudia Augustine RT

## 2022-07-08 NOTE — PROVIDER NOTIFICATION
Patient received prn iv haldol on evening and has been sleeping since.   Is it ok not to check VS tonight so patient can keep sleeping?   Ok to skip VS per Dr. Kar encarnacion.

## 2022-07-08 NOTE — PROVIDER NOTIFICATION
Dr. Lakhani paged as patient is agitated, she has prn iv haldol available. Last ECG showed prolonged QT, is it ok to give?   Ok to give iv haldol per Dr. Lakhani. New orders for new ECG.

## 2022-07-08 NOTE — PROGRESS NOTES
Care Management Follow Up    Length of Stay (days): 3    Expected Discharge Date: 07/09/2022     Concerns to be Addressed: covid care, pulm status-wean off O2      Patient plan of care discussed at interdisciplinary rounds: Yes     Anticipated Discharge Disposition:  memory care     Anticipated Discharge Services:  memory care  Anticipated Discharge DME:  none           Additional Information:  Patient covid positive. Currently on O2 2L. On 1:1 for safety.     Social History:  Patient lives at [facility] [phone], fax [phone]. She receives assist with bathing, dressing, meals, medications and toilet reminders. Has [facility] RN. She is independent with toileting and ambulates with a walker. Will need [facility] Transportation. Tiffanie Ellison is primary family contact.      Kimberly Carcamo RN  [phone]. Last update 7/7/22.     Updated memory care nurse Esha [phone] (cell) on 7/7. She provided verification of baseline status. She states patient would need to quarantine 10 days upon return to memory care.  For weekend admissions call main line.  There is an on-call nurse available. Ask for on-call nurse.     Will need resumption of home care with [facility]-RN.       Voice message left with Esha regarding patient to return tomorrow at 1100. Requested return call to confirm return. [facility] Stretcher ride scheduled. PCS completed.     Updated daughters Terra and Stephenie. Both are in agreement with return. Notified of stretcher ride and potential cost.          Carolyn Rivera RN

## 2022-07-08 NOTE — PROGRESS NOTES
[facility]    Progress Note - Hospitalist Service       Date of Admission:  7/4/2022    Cha Smith is a 89 year old female with PMH significant for COPD, a fib on apixaban, CKD, CLL, dementia, and lung cancer in sustained remission who presented to [facility] ED on 7/4 with multiple days of shortness of breath and cough. Requiring 2L NC and found to be covid positive with likely COPD exacerbation. On abx for concern for bacterial pneumonia.     Active Problems:    Lactic acidosis    Delirium    COPD exacerbation (H)    Acute respiratory failure with hypoxia (H)    Pneumonia due to infectious organism, unspecified laterality, unspecified part of lung    Sepsis, due to unspecified organism, unspecified whether acute organ dysfunction present (H)    Infection due to 2019 novel coronavirus     Acute hypoxic respiratory distress 2/2 covid infection  COPD, excacerbated  Increased SOB, wheezing, cough over last ~5 days PTA. Covid + on admission and requiring 2L NC. Vaccinated x4 and no known sick contacts but lives in large assisted living facility. ABG with paO2 47 but otherwise fairly unremarkable. CXR with dense retrocardiac opacity, possibly infiltrate, but not clearly defined. Has hx of COPD, and feels like inhalers help when she's taking them. Most suspect covid tipped her COPD over the edge as lacking typical infiltrate findings suggestive of simply gross covid pneumonia.   Unlikely superimposed bacterial pneumonia given negative procalcitonin.  Patient quit smoking 20 years ago.  -Supplemental O2 to maintain spO2 > 90%; now on room air  -Dexamethasone x10 doses (start 7/4); hold PTA prednisone 10mg daily  -Remidesivir x3 doses (start 7/4)  -No abx given neg procal  -Duoneub scheduled QID, Anoro ellipta scheduled 1 puff daily, abuterol neb prn TID  -Ordered combivent inhaler to be sent from pharmacy across the street in anticipation of discharge tomorrow, given her facility doesn't  allow nebulizers with active COVID infection  -Mucinex DM scheduled BID  -PTA eliquis for COVID AC  -Plan for discharge tomorrow, 7/9 now that she is on room air     Hyperlacticemia  CRP borderline elevated; procal normal. Leukocytosis to 27k with lymphocytic predominance (in setting of CLL) and lactic elevated to 3.9. CXR significant for dense retrocardiac opacity, though unclear etiology. No evidence of sepsis at this time.  Likely all just be secondary to covid w/ leukocytosis secondary to CLL, but particularly given lactic and overall mixed picture, treated for possible bacterial pneumonia overnight. Lactate down to 2.4 with 1L IVF. Likely remains elevated in the setting of receiving multiple albuterol containing nebs.  -No abx given neg procal  -Monitor vitals, especially BP  -CBC, CRP daily  -Blood cultures NGTD     Atrial fibrillation, on anticoagulation  NSR on admission. At home on metoprolol 12.5mg daily and apixaban 5mg BID.  -PTA apixaban  -PTA metoprolol     CKD 3B  eGFR 44, baseline 50-55.   -BMP in AM     CLL  WBC baseline 10.5-16.8, though data from 1 year ago. CLL likely the  of chronic leukocytosis.   -CBC in AM     Dementia  Hospital acquired delirium  Lives in [facility] in dementia unit. Has POLST on file w/ DNR-DNI. Was confused and rambunctious in ED but improved after haldol and ativan.  -Melatonin at night  -Redirect as able, frequent reorientation  -Virtual 1:1  -Haldol prn     HTN  -165/58-76. At home on metoprolol 12.5mg daily, aldactone 25mg daily. Patient not sure of home pressures.  -PTA metoprolol  -PTA aldactone     Ventral hernia  Wound over abdomen  Large ventral hernia. Difficult to reduce sheerly given size, however not painful and patient denies any changes. Small 1x1cm shallow wound over ventral hernia.  -Basic nursing wound cares; consider WOC if wound not improving     Chronic B/L LE edema: Takes bumex 2g daily for this, no clearly defined etiology. Possibly  "some undiagnosed HF vs chronic venous stasis. Resume PTA bumex  Mental health: PTA citalopram 20mg daily, mirtazapine 15mg daily  History of lung cancer in remission: NTD  Hx of immune pneumonitis: In setting of cancer immunotherapy in past; on chronic 10mg prednisone. Hold PTA prednisone for now in setting of getting dexamethasone for covid.  Hx of EARL: PTA ferrous sulfate        Medication Reconciliation Status: Complete     FEN: PO fluids  DVT Prophylaxis: Apixaban  Code: DNR-DNI     Dispo: inpatient status for AHRF, anticipate discharge to prior facility   Barriers to discharge:  oxygen   Anticipated discharge date:  multiple more days        Disposition Plan      Expected Discharge Date: 07/09/2022, 11:00 AM  Discharge Delays: Other (Add Comment)  Transportation - Medical/Taxi    Discharge Comments: O2 need, wean off and Transportation booked until 1900-too late for memory care return      The patient's care was discussed with the Dr. Tigre Mcmahan MD  Hospitalist Service  [facility]  Securely message with the Vocera Web Console (learn more here)  Text page via [employer] Paging/Directory         Clinically Significant Risk Factors Present on Admission                # Overweight: Estimated body mass index is 29.86 kg/m  as calculated from the following:    Height as of this encounter: 1.676 m (5' 6\").    Weight as of this encounter: 83.9 kg (185 lb).        ______________________________________________________________________    Interval History   Much more calm this morning.  Nursing notes reviewed overnight, was on room air for a while yesterday evening, though some mild desaturations with sleeping, and 1 L nasal cannula reapplied.  On 1 L still this morning when I saw her.  She states she does not need it and wants to get out of here, back to her memory care facility.    Data reviewed today: I reviewed all medications, new labs and imaging results over the last 24 hours. " I personally reviewed no images or EKG's today.    Physical Exam   Vital Signs: Temp: (!) 94.7  F (34.8  C) Temp src: Oral BP: 135/59 Pulse: 80   Resp: 18 SpO2: 94 % O2 Device: Nasal cannula Oxygen Delivery: 1 LPM  Weight: 185 lbs 0 oz  General: Sitting up comfortably in bed.   HEENT: Mild subconjunctival hemorrhage right lateral sclera  Neck: No masses appreciated.  Respiratory: Speaking full sentences.  Upper airway noises loud and wheezy with some limited wheezes heard throughout rest of lung fields.    Cardiac: RRR. Brisk cap refill.  Warm well perfused all extremities. JVD difficult to assess given body habitus, but appears <5cm; no HJR.  Abdominal: Abdomen is obese, soft and non-tender.  Ventral hernia that is nontender and largely reducible.   Extremities: Bilateral lower extremities with 1+ pitting edema up to mid shins.  Skin: Superficial 1 x 1 cm wound over ventral hernia without discharge or surrounding erythema.  Bruising over bilateral shins.  Neurological: EOMI.  Moves all extremities.  Psychiatric: Demented but pleasant and able to answer limited questions.    Data   No results found for this or any previous visit (from the past 24 hour(s)).

## 2022-07-08 NOTE — PLAN OF CARE
Problem: Risk for Delirium  Goal: Improved Behavioral Control  Outcome: Ongoing, Progressing     Problem: Risk for Delirium  Goal: Improved Sleep  Outcome: Ongoing, Progressing     Problem: Gas Exchange Impaired  Goal: Optimal Gas Exchange  Outcome: Ongoing, Progressing   Goal Outcome Evaluation:        Patient was agitated later on evening. Getting out of chair, fixated on needing to leave soon, incoherent speech.   Previous ECG showed prolonged QT, Dr. Lakhani updated and ordered new ECG and ok to give prn iv haldol.   Prn iv haldol given with apparent relief.   Patient has been sleeping since about 22:00 pm .  Dr. Lakhani paged again and ok to skip VS and let patient rest.     O2 SATs dropped to 87-88% in RA when patient was falling asleep. NC 1 L applied and O2 SATs wdl the rest of the night.   Very few exp wheezes noted upon auscultation.    Purewick in place with good amount of UO.  1 watery BM on evening, senna held.        Addendum 6:28 am: Patient woke up and complained of shortness of breath. Expiratory wheezes upon auscultation. O2 SATs 92-94% with 1 L NC. Prn albuterol nebs given.

## 2022-07-08 NOTE — PLAN OF CARE
Goal Outcome Evaluation:                    Confused, disoriented to place time and situation. Calm and pleasant, denies pain. Eating well. No cough observed, lungs sound clear. 91% o2 on RA. MD says okay for discharge to memory care.

## 2022-07-09 NOTE — PROGRESS NOTES
"Respiratory Care Note:    Pt remains on room air, SpO2 91%. Duoneb given as scheduled. Increased aeration post neb tx. Audible and upper airway wheezes noted. Pt tolerated neb tx well.     BP (!) 140/63 (BP Location: Left arm)   Pulse 81   Temp 98.6  F (37  C) (Oral)   Resp 20   Ht 1.676 m (5' 6\")   Wt 83.9 kg (185 lb)   SpO2 (!) 88%   BMI 29.86 kg/m      Eda Griffith, RT    "

## 2022-07-09 NOTE — PROGRESS NOTES
Care Management Discharge Note    Discharge Date: 07/09/2022       Discharge Disposition:  Return to [facility] memory care with home care.    Discharge Services:  [facility] for RN    Discharge DME:      Discharge Transportation: [facility] stretcher  transport.   Private pay costs discussed: Not applicable    Patient/family educated on Medicare website which has current facility and service quality ratings:  NA    Education Provided on the Discharge Plan:  yes  Persons Notified of Discharge Plans: yes  Patient/Family in Agreement with the Plan: yes    Handoff Referral Completed: Yes    Additional Information:  BEKAH unable to reach main number as specified on 7/8 RNCM notes. BEKAH left message on facility main number. BEKAH contacted pt daughter Terra, she is aware of pt discharge and spoke to facility yesterday. She stated the LPN at facility was aware that pt was being discharge on 7/9 and returning to facility. Instructions are for transport to [facility] in entrance and wait for someone to come.   BEKAH contacted [facility] transport and updated them with instructions for door.   BEKAH called main number multiple times, as daughter stated an aide should be answering the phone.     KHALIF Guerrero

## 2022-07-09 NOTE — PLAN OF CARE
Physical Therapy Discharge Summary    Reason for therapy discharge:    Discharged to [facility] with Home Care    Progress towards therapy goal(s). See goals on Care Plan in [facility] electronic health record for goal details.  Goals not met.  Barriers to achieving goals:   discharge from facility.    Therapy recommendation(s):    Continued therapy is recommended.  Rationale/Recommendations:  continue to address functional mobility.    JOSUÉ WayneT

## 2022-07-09 NOTE — DISCHARGE SUMMARY
[facility]  Discharge Summary - Medicine & Pediatrics       Date of Admission:  7/4/2022  Date of Discharge:  7/9/2022 12:06 PM  Discharging Provider: Ihsan Gong MD  Discharge Service: Hospitalist Service    Discharge Diagnoses     COVID-19 infection, recovered  Atrial fibrillation  Chronic kidney disease stage IIIb  Dementia  CLL  COPD     Follow-ups Needed After Discharge   Ensure patient restarts chronic prednisone 10 mg daily.  Consider evaluation for home oxygen  Follow-up CBC    Unresulted Labs Ordered in the Past 30 Days of this Admission     Date and Time Order Name Status Description    7/4/2022  6:21 PM Blood Culture Arm, Left Preliminary     7/4/2022  6:21 PM Blood Culture Line, venous Preliminary       These results will be followed up by PCP    Discharge Disposition   Discharged to assisted living  Condition at discharge: Stable      Hospital Course   Cha Smith is a 89 year old female with PMH significant for COPD, a fib on apixaban, CKD, CLL, dementia, and lung cancer in sustained remission who presented to [facility] ED on 7/4 with multiple days of shortness of breath and cough. Requiring 2L NC and found to be covid positive with likely COPD exacerbation. On abx for concern for bacterial pneumonia.        Acute hypoxic respiratory distress 2/2 covid infection  COPD, excacerbated  Presented with increased SOB, wheezing, cough for 5 days prior to admission.. Covid + on admission and requiring 2L NC. Vaccinated x4 and no known sick contacts. . CXR with dense retrocardiac opacity, possibly infiltrate, but not clearly defined.  Was not treated for bacterial pneumonia with a negative procalcitonin.  Was weaned off supplemental oxygen.  Discharged to complete 10-day course of dexamethasone.  Restart home prednisone after that time.       Hyperlacticemia  CRP borderline elevated; procal normal. Leukocytosis to 27k with lymphocytic predominance (in setting of CLL) and  lactic elevated to 3.9. CXR significant for dense retrocardiac opacity, though unclear etiology. No evidence of sepsis..   Lactic acid improved with fluid resuscitation.       Atrial fibrillation, on anticoagulation  NSR on admission. Continued home metoprolol 12.5mg daily and apixaban 5mg BID.       CKD 3B  Stable.  Normal creatinine on discharge.       CLL  WBC baseline 10.5-16.8, though data from 1 year ago. CLL likely the  of chronic leukocytosis.      Dementia  Hospital acquired delirium  Lives in [facility] in dementia unit. Has POLST on file w/ DNR-DNI. Initially confused and rambunctious in ED but improved after haldol and ativan.    HTN  Continued PTA medications     Ventral hernia  Wound over abdomen  Large ventral hernia. Difficult to reduce sheerly given size, however not painful and patient denies any changes.      Chronic B/L LE edema: Continued home bumex   Mental health: Continued citalopram 20mg daily, mirtazapine 15mg daily  Hx of immune pneumonitis: Held chronic prednisone 10 mg daily, to restart following completion of dexamethasone.  Hx of EARL: Continued iron        Consultations This Hospital Stay   CARE MANAGEMENT / SOCIAL WORK IP CONSULT  PHYSICAL THERAPY ADULT IP CONSULT    Code Status   No CPR- Do NOT Intubate       The patient was discussed with Dr. Tigre Gong MD  [facility]  [address] [address]  Phone: [phone]  Fax: [phone]  ______________________________________________________________________    Physical Exam   Vital Signs: Temp: 98.4  F (36.9  C) Temp src: Oral BP: (!) 140/60 Pulse: 76   Resp: 18 SpO2: 90 % O2 Device: None (Room air) Oxygen Delivery: 1 LPM  Weight: 185 lbs 0 oz  General Appearance: Comfortable.  No acute distress.  Respiratory: Comfortable respiratory effort.  Slight expiratory wheezing.  Cardiovascular: No cyanosis  GI: Nondistended.  Bowel  sounds present.    Skin: Bruising on the lower extremities bilaterally.  Neuro: Bright affect.  Fluent speech.          Primary Care Physician   Christelle Garay    Discharge Orders      Follow Up and recommended labs and tests    Please follow-up with your primary care doctor within 7 days for hospital follow-up. Please discuss your current medications. It would be reasonable to get basic lab work during this appointment.     Reason for your hospital stay    You were admitted to the hospital due to a COPD exacerbation likely due to a covid infection. You initially required supplemental oxygen, this was stopped once your symptoms improved. You are considered COVID recovered at this time based on the date of symptom onset.     Activity - Up with nursing assistance     No CPR- Do NOT Intubate     Diet    Follow this diet upon discharge: Orders Placed This Encounter      Combination Diet Regular Diet Adult       Significant Results and Procedures   Results for orders placed or performed during the hospital encounter of 07/04/22   XR Chest Port 1 View    Narrative    EXAM: XR CHEST PORT 1 VIEW  LOCATION: [facility]  DATE/TIME: 7/4/2022 6:38 PM    INDICATION: Wheeze, shortness of breath, cough. Evaluate for pneumonia.  COMPARISON: 01/12/2020      Impression    IMPRESSION:     Left chest port with catheter tip in the SVC. Surgical clips in the bilateral axillary regions.    Dense retrocardiac opacity, either atelectasis, infiltrate, and/or small pleural effusion.    Left midlung atelectasis/scarring, similar to prior.    No focal airspace disease in the right lung. No right pleural effusion. No pneumothorax.    Stable mild cardiomegaly. Aortic calcifications.       Discharge Medications   Discharge Medication List as of 7/9/2022 11:20 AM      START taking these medications    Details   dexamethasone (DECADRON) 6 MG tablet Take 1 tablet (6 mg) by mouth daily for 5 days, Disp-5 tablet, R-0, Local  Print      ipratropium-albuterol (COMBIVENT RESPIMAT)  MCG/ACT inhaler Inhale 1 puff into the lungs every 6 hours as needed for shortness of breath / dyspnea or wheezing, Disp-4 g, R-0, E-Prescribe         CONTINUE these medications which have CHANGED    Details   predniSONE (DELTASONE) 10 MG tablet Take 1 tablet (10 mg) by mouth daily, No Print Out         CONTINUE these medications which have NOT CHANGED    Details   acetaminophen (TYLENOL) 500 MG tablet Take 1,000 mg by mouth 3 times daily, Historical      albuterol (PROVENTIL) (2.5 MG/3ML) 0.083% neb solution Take 2.5 mg by nebulization 3 times daily as needed, Historical      apixaban ANTICOAGULANT (ELIQUIS) 5 MG tablet Take 5 mg by mouth 2 times daily, Historical      bisacodyl (DULCOLAX) 10 MG suppository Place 10 mg rectally 2 times daily as needed for constipation, Historical      bumetanide (BUMEX) 2 MG tablet Take 2 mg by mouth daily, Historical      calcium carbonate 500 mg, elemental, (OSCAL 500) 1250 (500 Ca) MG TABS tablet Take 1 tablet by mouth daily, Historical      citalopram (CELEXA) 20 MG tablet Take 20 mg by mouth daily, Historical      cyanocobalamin (VITAMIN B-12) 500 MCG tablet Take 500 mcg by mouth daily, Historical      dextran 70-hypromellose (BION TEARS PF) 0.1-0.3 % ophthalmic solution Place 1 drop into the right eye 2 times daily, Historical      dextromethorphan-guaiFENesin (MUCINEX DM)  MG 12 hr tablet Take 1 tablet by mouth every 12 hours, Historical      ferrous sulfate (FEROSUL) 325 (65 Fe) MG tablet Take 325 mg by mouth daily, Historical      ipratropium - albuterol 0.5 mg/2.5 mg/3 mL (DUONEB) 0.5-2.5 (3) MG/3ML neb solution Take 1 vial by nebulization 4 times daily, Historical      Melatonin 10 MG TABS tablet Take 1 tablet by mouth At Bedtime, Historical      menthol-zinc oxide (CALMOSEPTINE) 0.44-20.6 % OINT ointment Apply topically 2 times daily Upper and inner thigh. Radha areaHistorical      metoprolol succinate ER  (TOPROL XL) 25 MG 24 hr tablet Take 12.5 mg by mouth daily Hold if SBP < 100, Historical      mineral oil-hydrophilic petrolatum (AQUAPHOR) external ointment Apply topically At Bedtime Both legsHistorical      mirtazapine (REMERON) 15 MG tablet Take 15 mg by mouth At Bedtime, Historical      Neomycin-Bacitracin-Polymyxin (TRIPLE ANTIBIOTIC) 3.5-400-5000 OINT ointment Externally apply topically daily To woundHistorical      nystatin (MYCOSTATIN) 183735 UNIT/GM external powder Apply topically 2 times daily Under breastsHistorical      polyethylene glycol (MIRALAX) 17 g packet Take 1 packet by mouth daily, Historical      sennosides (SENOKOT) 8.6 MG tablet Take 1 tablet by mouth 2 times daily, Historical      spironolactone (ALDACTONE) 25 MG tablet Take 25 mg by mouth daily, Historical      umeclidinium-vilanterol (ANORO ELLIPTA) 62.5-25 MCG/INH oral inhaler Inhale 1 puff into the lungs daily, Historical      Vitamin D3 (CHOLECALCIFEROL) 125 MCG (5000 UT) tablet Take 5,000 Units by mouth daily, Historical           Allergies   Allergies   Allergen Reactions     Blood-Group Specific Substance      Pneumococcal Vaccine      Prevnar 13 [Pneumococcal 13-Roseanna Conj Vacc]

## 2022-07-09 NOTE — PLAN OF CARE
Goal Outcome Evaluation:  Patient is alert, disoriented to time and situation.   Denies pain.  Ate well for supper meal.  Had a loose stool this evening so senna was held at bedtime. Incontinent with purewick in place. Good urine output noted.  Patient had been sating 92-94% all shift until bedtime when pulse ox began alarming. Patient sats 86-89% so put on 1L O2/NC overnight while patient sleeps.  K 4.3-recheck in am.  Plan is for patient to return to [facility] tomorrow.  Very pleasant all shift, encourage shifting weight side to side when in chair and reposition when in bed with pillow support.

## 2022-07-09 NOTE — PLAN OF CARE
Goal Outcome Evaluation:                    93% on room air. Denies pain. Standing with 1 assist. Confused at baseline. Transferred via stretcher. Discharged.

## 2022-07-09 NOTE — PLAN OF CARE
Goal Outcome Evaluation:      Pt denied and offered no c/o pain when asked, absent of nonverbal indicators. Continued on a 1:1 / sitter outside of room viewing patient on via video surveillance. Pt was on 1ltrs via NC when writer took over at 2300. Probe site changed from toe to finger which yielded a better pleth/reading. Pt on RA overnight w/ SpO2: 90-94%. Absent of any behaviors. Did not attempt to get out of bed on own. Purewick utilized overnight, clear yellow urine. Pt able to make position changes on own.     LS: diminished w/ slight exp wheezes throughout which increase w/ exertion. A&O x person/place only. Calm and pleasant towards writer and other staff, receptive to all cares. Slept comfortably overnight.

## 2022-07-10 NOTE — PROGRESS NOTES
Received call from assisted living facility regarding ipratropium-albuterol inhaler and duo nebs.  Patient did not have these medications at her facility when she arrived home.  These were possibly sent to a different pharmacy.  She would benefit from having these medications on hand in the setting of her COPD.  Medications reordered and sent to [facility] pharmacy.  Defer refills to PCP.    Ihsan Gong MD PGY-2  [facility]   Pager# [phone]

## 2022-07-11 NOTE — PROGRESS NOTES
[facility] [place]    Background: Care Coordination referral placed from \[facility]\"" discharge report for reason of patient meeting criteria for a TCM outreach call by [facility] team.    Assessment: Upon chart review, [facility] Team member will cancel/close the referral for TCM outreach due to reason below:    Patient has discharged to a Group home, Memory Care or Nursing Home    Plan: Care Coordination referral for TCM outreach canceled.      Re Cross MA  [facility] [place], [facility]    *[facility] Team does NOT follow patient ongoing. Referrals are identified based on internal discharge reports and the outreach is to ensure patient has an understanding of their discharge instructions.